# Patient Record
Sex: FEMALE | Race: WHITE | Employment: OTHER | URBAN - METROPOLITAN AREA
[De-identification: names, ages, dates, MRNs, and addresses within clinical notes are randomized per-mention and may not be internally consistent; named-entity substitution may affect disease eponyms.]

---

## 2017-08-16 ENCOUNTER — HOSPITAL ENCOUNTER (OUTPATIENT)
Dept: LAB | Age: 57
Discharge: HOME | End: 2017-08-16
Payer: MEDICARE

## 2017-08-16 PROCEDURE — 85652 RBC SED RATE AUTOMATED: CPT

## 2017-08-16 PROCEDURE — 84443 ASSAY THYROID STIM HORMONE: CPT

## 2017-08-16 PROCEDURE — 84450 TRANSFERASE (AST) (SGOT): CPT

## 2017-08-16 PROCEDURE — 84460 ALANINE AMINO (ALT) (SGPT): CPT

## 2017-08-16 PROCEDURE — 80061 LIPID PANEL: CPT

## 2017-08-16 PROCEDURE — 82565 ASSAY OF CREATININE: CPT

## 2017-08-16 PROCEDURE — 83036 HEMOGLOBIN GLYCOSYLATED A1C: CPT

## 2017-08-16 PROCEDURE — 85025 COMPLETE CBC W/AUTO DIFF WBC: CPT

## 2020-06-16 ENCOUNTER — HOSPITAL ENCOUNTER (OUTPATIENT)
Dept: GENERAL RADIOLOGY | Age: 60
Discharge: HOME | End: 2020-06-18
Payer: MEDICARE

## 2020-06-16 PROCEDURE — 73130 X-RAY EXAM OF HAND: CPT

## 2020-08-19 ENCOUNTER — HOSPITAL ENCOUNTER (OUTPATIENT)
Dept: LAB | Age: 60
Discharge: HOME | End: 2020-08-19
Payer: MEDICARE

## 2020-08-19 LAB
ALBUMIN SERPL-MCNC: 4.4 G/DL (ref 3.5–4.6)
ALP BLD-CCNC: 88 U/L (ref 40–130)
ALT SERPL-CCNC: 29 U/L (ref 0–33)
ANION GAP SERPL CALCULATED.3IONS-SCNC: 14 MEQ/L (ref 9–15)
AST SERPL-CCNC: 32 U/L (ref 0–35)
BASOPHILS ABSOLUTE: 0.1 K/UL (ref 0–0.2)
BASOPHILS RELATIVE PERCENT: 0.6 %
BILIRUB SERPL-MCNC: 0.3 MG/DL (ref 0.2–0.7)
BUN BLDV-MCNC: 23 MG/DL (ref 8–23)
CALCIUM SERPL-MCNC: 10.8 MG/DL (ref 8.5–9.9)
CHLORIDE BLD-SCNC: 100 MEQ/L (ref 95–107)
CHOLESTEROL, TOTAL: 220 MG/DL (ref 0–199)
CO2: 23 MEQ/L (ref 20–31)
CREAT SERPL-MCNC: 0.74 MG/DL (ref 0.5–0.9)
EOSINOPHILS ABSOLUTE: 0.3 K/UL (ref 0–0.7)
EOSINOPHILS RELATIVE PERCENT: 3.4 %
GFR AFRICAN AMERICAN: >60
GFR NON-AFRICAN AMERICAN: >60
GLOBULIN: 2.8 G/DL (ref 2.3–3.5)
GLUCOSE BLD-MCNC: 73 MG/DL (ref 70–99)
HBA1C MFR BLD: 5.6 % (ref 4.8–5.9)
HCT VFR BLD CALC: 38.3 % (ref 37–47)
HDLC SERPL-MCNC: 85 MG/DL (ref 40–59)
HEMOGLOBIN: 12.9 G/DL (ref 12–16)
LDL CHOLESTEROL CALCULATED: 68 MG/DL (ref 0–129)
LYMPHOCYTES ABSOLUTE: 2.5 K/UL (ref 1–4.8)
LYMPHOCYTES RELATIVE PERCENT: 26.7 %
MCH RBC QN AUTO: 32 PG (ref 27–31.3)
MCHC RBC AUTO-ENTMCNC: 33.7 % (ref 33–37)
MCV RBC AUTO: 94.7 FL (ref 82–100)
MONOCYTES ABSOLUTE: 1.2 K/UL (ref 0.2–0.8)
MONOCYTES RELATIVE PERCENT: 12.5 %
NEUTROPHILS ABSOLUTE: 5.3 K/UL (ref 1.4–6.5)
NEUTROPHILS RELATIVE PERCENT: 56.8 %
PDW BLD-RTO: 15.3 % (ref 11.5–14.5)
PLATELET # BLD: 289 K/UL (ref 130–400)
POTASSIUM SERPL-SCNC: 4 MEQ/L (ref 3.4–4.9)
RBC # BLD: 4.04 M/UL (ref 4.2–5.4)
SODIUM BLD-SCNC: 137 MEQ/L (ref 135–144)
TOTAL PROTEIN: 7.2 G/DL (ref 6.3–8)
TRIGL SERPL-MCNC: 336 MG/DL (ref 0–150)
WBC # BLD: 9.3 K/UL (ref 4.8–10.8)

## 2020-08-19 PROCEDURE — 80061 LIPID PANEL: CPT

## 2020-08-19 PROCEDURE — 80053 COMPREHEN METABOLIC PANEL: CPT

## 2020-08-19 PROCEDURE — 83036 HEMOGLOBIN GLYCOSYLATED A1C: CPT

## 2020-08-19 PROCEDURE — 85025 COMPLETE CBC W/AUTO DIFF WBC: CPT

## 2020-09-17 ENCOUNTER — HOSPITAL ENCOUNTER (OUTPATIENT)
Dept: LAB | Age: 60
Discharge: HOME | End: 2020-09-17
Payer: MEDICARE

## 2020-09-17 LAB
PARATHYROID HORMONE INTACT: 101.2 PG/ML (ref 15–65)
TSH SERPL DL<=0.05 MIU/L-ACNC: 1.27 UIU/ML (ref 0.44–3.86)

## 2020-09-17 PROCEDURE — 83970 ASSAY OF PARATHORMONE: CPT

## 2020-09-17 PROCEDURE — 84443 ASSAY THYROID STIM HORMONE: CPT

## 2020-10-08 ENCOUNTER — HOSPITAL ENCOUNTER (OUTPATIENT)
Dept: NUCLEAR MEDICINE | Age: 60
Discharge: HOME | End: 2020-10-10
Payer: MEDICARE

## 2020-10-08 PROCEDURE — A9500 TC99M SESTAMIBI: HCPCS | Performed by: OTOLARYNGOLOGY

## 2020-10-08 PROCEDURE — 3430000000 HC RX DIAGNOSTIC RADIOPHARMACEUTICAL: Performed by: OTOLARYNGOLOGY

## 2020-10-08 PROCEDURE — 78071 PARATHYRD PLANAR W/WO SUBTRJ: CPT

## 2020-10-08 RX ADMIN — TETRAKIS(2-METHOXYISOBUTYLISOCYANIDE)COPPER(I) TETRAFLUOROBORATE 29.9 MILLICURIE: 1 INJECTION, POWDER, LYOPHILIZED, FOR SOLUTION INTRAVENOUS at 09:05

## 2021-06-04 ENCOUNTER — HOSPITAL ENCOUNTER (OUTPATIENT)
Dept: LAB | Age: 61
Discharge: HOME | End: 2021-06-04
Payer: MEDICARE

## 2021-06-04 LAB
CALCIUM SERPL-MCNC: 11.4 MG/DL (ref 8.5–9.9)
PARATHYROID HORMONE INTACT: 75.1 PG/ML (ref 15–65)

## 2021-06-04 PROCEDURE — 83970 ASSAY OF PARATHORMONE: CPT

## 2021-06-04 PROCEDURE — 82310 ASSAY OF CALCIUM: CPT

## 2021-07-30 ENCOUNTER — INITIAL CONSULT (OUTPATIENT)
Dept: PAIN MANAGEMENT | Age: 61
End: 2021-07-30
Payer: MEDICARE

## 2021-07-30 VITALS
RESPIRATION RATE: 15 BRPM | HEART RATE: 83 BPM | OXYGEN SATURATION: 96 % | TEMPERATURE: 96.6 F | DIASTOLIC BLOOD PRESSURE: 78 MMHG | BODY MASS INDEX: 27.12 KG/M2 | SYSTOLIC BLOOD PRESSURE: 124 MMHG | HEIGHT: 65 IN

## 2021-07-30 DIAGNOSIS — M54.16 LUMBAR RADICULOPATHY: Primary | ICD-10-CM

## 2021-07-30 DIAGNOSIS — M79.605 LEFT LEG PAIN: ICD-10-CM

## 2021-07-30 DIAGNOSIS — Z98.1 HISTORY OF FUSION OF LUMBAR SPINE: ICD-10-CM

## 2021-07-30 DIAGNOSIS — M25.552 LEFT HIP PAIN: ICD-10-CM

## 2021-07-30 DIAGNOSIS — R10.32 LEFT GROIN PAIN: ICD-10-CM

## 2021-07-30 DIAGNOSIS — K83.8 DILATION OF COMMON BILE DUCT: ICD-10-CM

## 2021-07-30 PROCEDURE — 99204 OFFICE O/P NEW MOD 45 MIN: CPT | Performed by: PHYSICAL MEDICINE & REHABILITATION

## 2021-07-30 RX ORDER — PREGABALIN 75 MG/1
75 CAPSULE ORAL 2 TIMES DAILY
Qty: 60 CAPSULE | Refills: 0 | Status: SHIPPED | OUTPATIENT
Start: 2021-07-30 | End: 2021-08-30 | Stop reason: SDUPTHER

## 2021-07-30 RX ORDER — LIDOCAINE 40 MG/G
CREAM TOPICAL
Qty: 1 TUBE | Refills: 1 | Status: SHIPPED | OUTPATIENT
Start: 2021-07-30

## 2021-07-30 RX ORDER — TOFACITINIB 11 MG/1
TABLET, FILM COATED, EXTENDED RELEASE ORAL
COMMUNITY

## 2021-07-30 ASSESSMENT — ENCOUNTER SYMPTOMS
CONSTIPATION: 0
SHORTNESS OF BREATH: 0
DIARRHEA: 0
BACK PAIN: 1
NAUSEA: 0

## 2021-07-30 NOTE — PROGRESS NOTES
2011 after back surgery. She used to enjoy vacationing to [facility], [address] [place] [address]. Allergies:  Benazepril hcl and Bactrim [sulfamethoxazole-trimethoprim]    Past Medical History:   Diagnosis Date    Hyperlipidemia     Hypertension     Hypothyroidism     RA (rheumatoid arthritis) ([facility] [address]      Past Surgical History:   Procedure Laterality Date    BREAST SURGERY Right 2010    begnine tumor removal    SPINE SURGERY       History reviewed. No pertinent family history. Social History     Socioeconomic History    Marital status: Single     Spouse name: Not on file    Number of children: Not on file    Years of education: Not on file    Highest education level: Not on file   Occupational History    Not on file   Tobacco Use    Smoking status: Former Smoker    Smokeless tobacco: Never Used   Substance and Sexual Activity    Alcohol use: Not on file    Drug use: Not on file    Sexual activity: Not on file   Other Topics Concern    Not on file   Social History Narrative    Not on file     Social Determinants of Health     Financial Resource Strain:     Difficulty of Paying Living Expenses:    Food Insecurity:     Worried About Running Out of Food in the Last Year:     [address] in the Last Year:    Transportation Needs:     Lack of Transportation (Medical):      Lack of Transportation (Non-Medical):    Physical Activity:     Days of Exercise per Week:     Minutes of Exercise per Session:    Stress:     Feeling of Stress :    Social Connections:     Frequency of Communication with Friends and Family:     Frequency of Social Gatherings with Friends and Family:     Attends Denominational Services:     Active Member of Clubs or Organizations:     Attends Club or Organization Meetings:     Marital Status:    Intimate Partner Violence:     Fear of Current or Ex-Partner:     Emotionally Abused:     Physically Abused:     Sexually Abused:        Current Outpatient Medications on File Prior to Visit   Medication Sig Dispense Refill    Tofacitinib Citrate ER (XELJANZ XR) 11 MG TB24       levothyroxine (SYNTHROID) 25 MCG tablet Take by mouth      atenolol (TENORMIN) 50 MG tablet Take 50 mg by mouth daily      leflunomide (ARAVA) 20 MG tablet Take 20 mg by mouth daily      lovastatin (MEVACOR) 40 MG tablet Take 40 mg by mouth nightly      HYDROcodone-acetaminophen (NORCO) 5-325 MG per tablet Take 1 tablet by mouth every 6 hours as needed for Pain 15 tablet 0    gabapentin (NEURONTIN) 300 MG capsule Take by mouth       No current facility-administered medications on file prior to visit. Review of Systems   Constitutional: Negative for fever. HENT: Negative for hearing loss. Respiratory: Negative for shortness of breath. Gastrointestinal: Negative for constipation, diarrhea and nausea. Genitourinary: Negative for difficulty urinating. Musculoskeletal: Positive for back pain. Negative for neck pain. Skin: Negative for rash. Neurological: Negative for headaches. Hematological: Does not bruise/bleed easily. Psychiatric/Behavioral: Negative for sleep disturbance. Objective:     Vitals:  /78   Pulse 83   Temp 96.6 °F (35.9 °C) (Temporal)   Resp 15   Ht 5' 5\" (1.651 m)   SpO2 96%   BMI 27.12 kg/m²        Exam performed under Coronavirus precautions  Gen: No acute distress  Neck: Grossly symmetric without any significant thyromegaly or masses appreciated. Eyes: No scleral icterus or lid lag appreciated bilaterally. Irises without gross defects bilaterally. HEENT: Hearing grossly intact bilaterally. Normocephalic, external ears and visible portions of nose and mouth atraumatic. Lymph: No gross neck or axillary lymphadenopathy  Cardio: No significant lower extremity edema, pulses intact without significant digit ischemia. Abd: No gross masses or large hernias appreciated. Skin: Visualized skin without any dermatomal rashes or sores.  Palpation free of any tightening or subcutaneous nodules. MSK: Gait is antalgic. No significant upper limb digit ischemia appreciated. Psych: Pleasant and cooperative with the history and exam. Mood and Affect normal. Appropriately dressed with good eye contact. Judgement and insight normal. Recent and remote memory intact. Alert and Oriented x3. Neuro: Cranial nerves II-XII grossly intact. No significant pathologic reflexes appreciated. Rises from a seated to standing position with moderate difficulty. Gait is antalgic. +Bilateral AFOs, unable to flex extend ankles bilaterally     Lumbar flexion to 70 degrees, extension to 20 degrees. Limited lumbar spine range of motion. Rotation and extension reproduces axial low back pain. Other facet provocative maneuvers are positive. No gross step offs noted. Tenderness to palpation over the mid to low lumbar spinous processes and bilateral lumbar paraspinals from L2 down to the sacrum. No tenderness over bilateral PSIS. No tenderness over bilateral greater trochanters. No tenderness over bilateral deep gluteal regions. Sensation grossly intact in both legs except for Left L3 and Left L4 parethesias. Reflexes and strength functional for ambulation, no abnormal reflexes appreciated on exam today  Strength greater than 3/5 bilateral legs  Straight leg raise positive on exam today    Left groin pain. Outside record review:  Review of the original consultation request reveals no specific diagnostic requests or clinical concerns aside from lumbar radiculopathy, back pain that require particular attention. There are no suggested, requested, or specified tests to be ordered or any prior diagnostics performed that require follow-up or further investigation. MRI lumbar spine 12/29/2010: Contrast study. Degenerative disease and facet arthropathy. Postoperative changes L4 laminectomy, L3-4 screw and artie fusion on the left, L4-5 screw and artie fixation on the right. Retrolisthesis. Severe partial tear changes L4. No fracture. Prominent biliary ductal system and pancreatic duct. L1-L2 normal canal foramen. L2-L3 mild canal stenosis, mild bilateral foraminal stenosis. L3-L4 severe canal stenosis, severe left and moderate right foraminal stenosis. L4-L5 severe canal stenosis, severe left and mild right foraminal stenosis. L5-S1 normal canal, mild bilateral foraminal stenosis. XR LS spine 11/29/2010: Rods and screws fixing L3-L5. Degenerative disc disease and facet arthropathy. Anterior subluxation L4. No fracture. Multiple air-fluid levels small bowel:, Clinical correlation  XR bilateral hip 11/29/2010: Hip and sacroiliac joints unremarkable. No fracture. Component      Latest Ref Rng & Units 8/19/2020           1:25 PM   Platelet Count      608 - 400 K/uL 289     Component      Latest Ref Rng & Units 8/19/2020           1:25 PM   Alk Phos      40 - 130 U/L 88   ALT      0 - 33 U/L 29   AST      0 - 35 U/L 32     Component      Latest Ref Rng & Units 8/19/2020           1:25 PM   Creatinine      0.50 - 0.90 mg/dL 0.74     Family history of alcohol abuse +1  Family history of illegal drug abuse 0  Family history of prescription drug abuse 0    Personal history of alcohol abuse/DUI +3 in the 1980s  Personal history of illegal drug abuse 0  Personal history of prescription drug abuse 0    Age between 15-45 0    History of preadolescent sexual abuse 0    Personal history of obsessive compulsive disorder 0  Personal history of attention deficit disorder 0  Personal history of bipolar disorder 0  Personal history of schizophrenia 0  Personal history of depression 0    Score = 4, moderate risk    Assessment:      Diagnosis Orders   1.  Lumbar radiculopathy  Amb External Referral To Physical Therapy    XR LUMBAR SPINE (2-3 VIEWS)    Urine Drug Screen    pregabalin (LYRICA) 75 MG capsule    MO NJX AA&/STRD TFRML EPI LUMBAR/SACRAL 1 LEVEL    MO NJX AA&/STRD TFRML EPI LUMBAR/SACRAL EA ADDL   2. Left leg pain  EMG    XR LUMBAR SPINE (2-3 VIEWS)   3. Left hip pain     4. History of fusion of lumbar spine  XR LUMBAR SPINE (2-3 VIEWS)   5. Dilation of common bile duct  Winnie Dial MD - [address]   6. Left groin pain  XR HIP LEFT (2-3 VIEWS)       Plan:     Periodic Controlled Substance Monitoring: Obtaining appropriate analgesic effect of treatment. Harvey Scott MD)    Orders Placed This Encounter   Medications    pregabalin (LYRICA) 75 MG capsule     Sig: Take 1 capsule by mouth 2 times daily for 30 days. Dispense:  60 capsule     Refill:  0    lidocaine (LMX) 4 % cream     Sig: Apply a half dollar sized amount to intact skin topically up to twice daily as needed for pain     Dispense:  1 Tube     Refill:  1       Orders Placed This Encounter   Procedures    XR HIP LEFT (2-3 VIEWS)     Standing Status:   Future     Standing Expiration Date:   7/30/2022    XR LUMBAR SPINE (2-3 VIEWS)     Standing Status:   Future     Standing Expiration Date:   7/30/2022     Order Specific Question:   Reason for exam:     Answer:   Please evaluate for the presence of lumbar facet arthropathy    Urine Drug Screen    Amb External Referral To Physical Therapy     Referral Priority:   Routine     Referral Type:   Consult for Advice and Opinion     Referral Reason:   Specialty Services Required     Requested Specialty:   Physical Therapy     Number of Visits Requested:   [address], [address] Hepatology     Referral Priority:   Routine     Referral Type:   Eval and Treat     Referral Reason:   Specialty Services Required     Referred to Provider:   Mark Berkowitz MD     Requested Specialty:   Hepatology     Number of Visits Requested:   1    EMG     Standing Status:   Future     Standing Expiration Date:   10/28/2021     Order Specific Question:   Which body part?      Answer:   Bilateral lower extremities    AL NJX AA&/STRD TFRML EPI LUMBAR/SACRAL 1 LEVEL Left Lumbar L3/4 and L4/5 transforaminal epidural steroid injection under XR with Dr Surya Doyle. No anticoagulation, no antibiotics, no diabetes mellitus, no osteoporosis, no bleeding or platelet dysfunction, IV Dye okay, allergies reviewed, 30 minute procedure     Standing Status:   Future     Standing Expiration Date:   10/28/2021    IA NJX AA&/STRD TFRML EPI LUMBAR/SACRAL EA ADDL     Left Lumbar L3/4 and L4/5 transforaminal epidural steroid injection under XR with Dr Surya Doyle. No anticoagulation, no antibiotics, no diabetes mellitus, no osteoporosis, no bleeding or platelet dysfunction, IV Dye okay, allergies reviewed, 30 minute procedure     Standing Status:   Future     Standing Expiration Date:   10/28/2021       -PT for lumbar radiculopathy  Refer to hepatology given MRI lumbar spine imaging [facility] December 29, 2010 with prominence of the biliary ductal system and pancreatic duct. Encourage the patient to continue follow-up with ear nose and throat surgery for elevated calcium levels. -XR L Hip eval osteoarthritis   -XR LS Spine AP LAT to evaluate for lumbar facet arthropathy  -EMG B LE eval left leg and thigh pain  -Low threshold for MRI LS Spine with and without contrast may be given if her symptoms do not improve with conservative treatment, history of ? L3-5 fusion  -Lidocaine 4% ointment topical BID prn #1 tube one refill start 7/30/2021   -Continue Lodine Etodolac from Dr Dorado Brightly relief with Gabapentin in the past. Unsure on dose, but she thinks it was [place] lot\". Will try   -Lyrica 75 mg BID #60 no ref start 7/30/2021 [place] reviewed on 7/30/2021   -Consideration for opioids may be given . UDS today in case needed  -Left Lumbar L3/4 and L4/5 transforaminal epidural steroid injection under XR with Dr Surya Doyle. No anticoagulation, no antibiotics, no diabetes mellitus, no osteoporosis, no bleeding or platelet dysfunction, IV Dye okay, allergies reviewed, 30 minute procedure.  Prone position Consider 10 mg  dexamethasone, subpedicular approach    -Sparing use of back brace    Controlled Substance Monitoring:    Acute and Chronic Pain Monitoring:   RX Monitoring 7/30/2021   Periodic Controlled Substance Monitoring Obtaining appropriate analgesic effect of treatment. Discussed the risks, side effects, and symptoms that would warrant urgent or emergent physician evaluation of all medications prescribed today. The patient was advised that NSAID-type medications have three important potential side effects: gastrointestinal irritation including hemorrhage, myocardial injury including possible infarction, and kidney injury. She was asked to take the medication with food, avoid any other NSAIDs or steroids, and discontinue if she develops any concerning symptoms. She should immediately stop the medication and proceed to the emergency department for chest pain, dark urine or difficulty with producing urine, vomiting, abdominal pain or black/tarry stools. The patient expresses understanding of these issues and all questions were answered. Discussed the risks of the above recommended procedures including but not limited to bleeding, infection, worsened pain, damage to surrounding structures, side effects, toxicity, allergic reactions to medications used, immune and stress-response dysfunction, fat necrosis, skin pigmentation changes, blood sugar elevation, headache, vision changes, need for surgery, as well as catastrophic injury such as vision loss, paralysis, stroke, spinal cord and/or plexus infarction or injury, intrathecal injection, spinal cord puncture, arachnoiditis, discitis, bowel or bladder incontinence, ventilator dependence, loss of use of the arms and/or legs, and death. Discussed off-label use of corticosteroids and how the Food and Drug Administration (FDA) has not approved corticosteroids for epidural use.  Discussed the risks, benefits, alternative procedures, and alternatives to the procedure including no procedure at all. Discussed that we cannot undo any permanent neurologic damage or change the course of any underlying disease. After thorough discussion, patient expressed understanding and willingness to proceed. Provided education and counseling regarding the diagnosis, prognosis, and treatment options. All questions were answered. Encouraged her to follow-up with her primary care physician and/or specialists as required for her overall health and management of her comorbidities as well as any new positive symptoms mentioned in review of systems above. Care was provided within the definitions and limitations of our specialty practice. Encouraged lifestyle interventions including healthy habits, lifestyle changes, regular aerobic exercise and appropriate weight maintenance as advised by their primary care physician or cardiovascular health provider. Discussed well care and disease prevention/maintenance. All recommendations for therapy are provided to improve function with activities of daily living, decrease pain, and help develop an exercise program. All recommendations for medications are meant to help decrease pain, improve function with activities of daily living, maintain compliance with home exercise program, and improve quality of life. All recommendations for therapeutic injections are meant to help decrease pain, improve function with activities of daily living, maintain compliance with home exercise program, improve quality of life, and decrease reliance upon oral medications. Encouraged compliance with her home exercise program. Recommended compliance with physical therapy program as outlined above. Informed her to wear bracing as appropriate, and that bracing is not a replacement for core strengthening or muscle stabilization. Discussed the elevated risks of excessive sedation while on pain medications.  Advised her against driving or operating heavy machinery or performing any activities where she may harm herself or others while on pain medications. Particular caution was emphasized especially during dose adjustments and medication changes. Discussed the elevated risks of respiratory depression and death while on opioid medications, especially when combined with other sedative substances. Discussed the risks of temporary disability, permanent disability, morbidity, and mortality with poorly-managed or undiagnosed medical conditions and comorbidities. Emphasized the importance of timely medical evaluation and treatment as previously recommended by us or other medical professionals. Risks of not pursing these recommendations were emphasized. Advised her that any lab testing, imaging, or other diagnostic test results are best discussed in person in the office so that we can provide a clear explanation of their significance and best treatment based upon these results. It is her responsibility to make and keep a follow up appointment to discuss these test results in person to discuss the significance of the findings and appropriate follow-up steps. She expressed complete understanding and agreement with the entire plan as outlined above. Portions of this note may have been typed, auto-populated, dictated or transcribed by voice recognition resulting in errors, omissions, or close substitutions which may be missed despite careful proofreading. Please contact the author for any questions or concerns. Thank you Dr. Andre José for the opportunity to participate in this patient's care. If you have any questions or concerns, please do not hesitate to contact us. Follow up:  Return in about 1 month (around 8/30/2021) for reassessment of pain and symptoms, EMG Internal, P.T. External Ref.     Jamee Garza MD

## 2021-08-02 DIAGNOSIS — M54.16 LUMBAR RADICULOPATHY: ICD-10-CM

## 2021-08-02 DIAGNOSIS — R10.32 LEFT GROIN PAIN: ICD-10-CM

## 2021-08-02 DIAGNOSIS — M79.605 LEFT LEG PAIN: ICD-10-CM

## 2021-08-02 DIAGNOSIS — Z98.1 HISTORY OF FUSION OF LUMBAR SPINE: ICD-10-CM

## 2021-08-03 ENCOUNTER — TELEPHONE (OUTPATIENT)
Dept: PAIN MANAGEMENT | Age: 61
End: 2021-08-03

## 2021-08-03 NOTE — TELEPHONE ENCOUNTER
ORDER PLACED:    Date: 07/30/21  Description: LEFT L3-4, L4-5 TRANSFORAMINAL  Order Number: [id number]  Ordering Provider: [facility]  Performing Provider: [facility]  CPT Codes: 11799, 44956  ICD10 Codes: M54.16

## 2021-08-05 ENCOUNTER — OFFICE VISIT (OUTPATIENT)
Dept: PAIN MANAGEMENT | Age: 61
End: 2021-08-05
Payer: MEDICARE

## 2021-08-05 DIAGNOSIS — M79.605 LEFT LEG PAIN: ICD-10-CM

## 2021-08-05 PROCEDURE — 95911 NRV CNDJ TEST 9-10 STUDIES: CPT | Performed by: PHYSICAL MEDICINE & REHABILITATION

## 2021-08-05 PROCEDURE — 95886 MUSC TEST DONE W/N TEST COMP: CPT | Performed by: PHYSICAL MEDICINE & REHABILITATION

## 2021-08-05 NOTE — PROGRESS NOTES
Electromyography (EMG)/Nerve conduction studies (NCS) Report: Lower Extremity    Name: Leslee Villarreal   YOB: 1960  Date of Service: 8/5/2021   Provider: Prema Paz MD        INDICATIONS:  Leslee Villarreal is a 56 y.o. female who presents for electrodiagnostic evaluation for left leg pain. She confirms a history of thyroid disease and lumbar spine surgery. Both limbs are necessary to examine in order to evaluate for any evidence of systemic disease as well as establish normal baseline values from which to compare any abnormal unilateral findings. The study is explained and verbal consent to proceed is obtained. NERVE CONDUCTION STUDIES:  Sensory nerve conduction studies: Bilateral sural and superficial peroneal sensory nerve conduction studies demonstrate normal distal latencies and amplitudes. Bilateral lower limb temperatures are normal.    Motor nerve conduction studies: Left peroneal motor nerve conduction study with pickup over the extensor digitorum brevis demonstrates no response. Given this abnormality, an additional proximal study is performed. Left peroneal motor nerve conduction study with pickup over the tibialis anterior demonstrates no response. Right peroneal motor nerve conduction study with pickup over the extensor digitorum brevis demonstrates normal distal latency and borderline-normal amplitude. Bilateral tibial motor nerve conduction studies with pickup over the abductor hallucis demonstrate normal distal latencies and amplitudes. H reflex: Bilateral H reflexes are difficult to elicit. ELECTROMYOGRAPHY: A disposable monopolar needle is used to evaluate the left vastus medialis, vastus lateralis, rectus femoris, tibialis anterior, extensor hallucis longus, flexor digitorum longus, peroneus longus, medial gastrocnemius, and lateral gastrocnemius.   Left lateral gastrocnemius demonstrates increased insertional activity and +3 abnormal spontaneous activity with large positive sharp waves along with decreased recruitment and increased duration consistent with neurogenic units. Left tibialis anterior demonstrates increased insertional activity with +1 abnormal spontaneous activity. Left extensor hallucis longus and left flexor digitorum longus demonstrate increased insertional activity and trace abnormal spontaneous activity. All of the other muscles sampled are free of any increased insertional activity or any abnormal spontaneous activity. Motor unit recruitment is unremarkable. A disposable monopolar needle is used to evaluate the right vastus medialis, vastus lateralis, rectus femoris, tibialis anterior, extensor hallucis longus, flexor digitorum longus, peroneus longus, medial gastrocnemius, and lateral gastrocnemius. All of the muscles sampled are free of any increased insertional activity or any abnormal spontaneous activity. Motor unit recruitment is unremarkable. Lumbar paraspinal muscle sampling is deferred given history of lumbar spine surgery. SUMMARY:  This study is abnormal:  1. There is current electrodiagnostic evidence for a Left S1 lumbosacral motor radiculopathy with active and ongoing denervation. There is also evidence for a Left L4-L5 lumbosacral motor radiculopathy, but the muscles involved with these levels demonstrate less profound changes than what is seen in the Left S1 distribution. 2. There is limited electrodiagnostic evidence for a non-localized Left peroneal motor neuropathy. Both motor responses are absent making localization difficult. There are electromyography changes in left peroneal muscles, but these are thought to be due to the abnormality above. 3. There is no current evidence for a generalized large fiber sensorimotor peripheral polyneuropathy. RECOMMENDATIONS: The patient should follow up as previously instructed.  If her symptoms persist or worsen, further electrodiagnostic evaluation may be considered if the patient is agreeable. Clinical correlation is recommended.

## 2021-08-06 NOTE — TELEPHONE ENCOUNTER
SM- TRANSFORAMINAL AUTH- LEFT L3-4, L4-5 X1- 8/5/2021 TO 2/1/2021     OK to schedule procedure approved as above. Please note sides/levels approved and date range.    (If applicable, sides/levels approved may differ from those ordered)    TO BE SCHEDULED WITH DR Abeba Briggs

## 2021-08-06 NOTE — TELEPHONE ENCOUNTER
AUTHORIZATION: LEFT L3-4, L4-5 LUMBAR TRANSFORAMINAL     INSURANCE: ALANNA WYNN VIA: Matteo Skill #: [id number]    DATE RANGE: 8/5/2021 TO 2/1/2021     TELEPHONE CALL ROUTED TO MA TO SCHEDULE.

## 2021-08-17 DIAGNOSIS — M54.16 LUMBAR RADICULOPATHY: ICD-10-CM

## 2021-08-17 RX ORDER — PREGABALIN 75 MG/1
75 CAPSULE ORAL 2 TIMES DAILY
Qty: 60 CAPSULE | Refills: 0 | Status: CANCELLED | OUTPATIENT
Start: 2021-08-17 | End: 2021-09-16

## 2021-08-17 NOTE — TELEPHONE ENCOUNTER
Requested Prescriptions     Pending Prescriptions Disp Refills    pregabalin (LYRICA) 75 MG capsule 60 capsule 0     Sig: Take 1 capsule by mouth 2 times daily for 30 days.        Patient last seen on:  8/5  Date of last surgery:  n/a  Date of last refill:  7/30  Pain level:  n/a  Patient complaining of:  Pt asking for refill  Future appts: 8/24

## 2021-08-17 NOTE — TELEPHONE ENCOUNTER
My records suggest she has enough Lyrica to last from 7/30-8/29. It appears that she has a follow up on 8/24.    -Please clarify if the patient requires a refill. Was she taking 3 tab/day?

## 2021-08-20 ENCOUNTER — TELEPHONE (OUTPATIENT)
Dept: PAIN MANAGEMENT | Age: 61
End: 2021-08-20

## 2021-08-20 NOTE — TELEPHONE ENCOUNTER
BENEFITS: LT L3-4, 4-5 TRANSFORAMINAL     Insurance: Saurav Mcmanus [facility]  Phone: [phone]  Contact Name: MATHEW Rodriguez Date: 1.1.2021     Plan year: YES-CALENDAR  Deductible: N/A      Deductible Met: N/A  Allowed/benefits paid at: 100% AFTER $40.00 COPAY  OOP: 5900.00 MET $145.00  Freq Limits: 67546 & 17957--ZEOHE ON MEDICAL NECESSITY  Prior Auth Requirement: YES THROUGH [employer]--AUTH COMPLETED BY [facility]    Notes: NO PRE-EX CLAUSE    Call Reference #: [id number]    Time of call: 10:50AM

## 2021-08-24 ENCOUNTER — PROCEDURE VISIT (OUTPATIENT)
Dept: PAIN MANAGEMENT | Age: 61
End: 2021-08-24
Payer: MEDICARE

## 2021-08-24 DIAGNOSIS — M54.16 LUMBAR RADICULOPATHY: ICD-10-CM

## 2021-08-24 PROCEDURE — 64483 NJX AA&/STRD TFRM EPI L/S 1: CPT | Performed by: PHYSICAL MEDICINE & REHABILITATION

## 2021-08-24 PROCEDURE — 64484 NJX AA&/STRD TFRM EPI L/S EA: CPT | Performed by: PHYSICAL MEDICINE & REHABILITATION

## 2021-08-24 RX ORDER — DEXAMETHASONE SODIUM PHOSPHATE 10 MG/ML
10 INJECTION, EMULSION INTRAMUSCULAR; INTRAVENOUS ONCE
Status: COMPLETED | OUTPATIENT
Start: 2021-08-24 | End: 2021-08-24

## 2021-08-24 RX ORDER — LIDOCAINE HYDROCHLORIDE 10 MG/ML
4 INJECTION, SOLUTION EPIDURAL; INFILTRATION; INTRACAUDAL; PERINEURAL ONCE
Status: COMPLETED | OUTPATIENT
Start: 2021-08-24 | End: 2021-08-24

## 2021-08-24 RX ORDER — SODIUM CHLORIDE 9 MG/ML
1 INJECTION INTRAVENOUS ONCE
Status: COMPLETED | OUTPATIENT
Start: 2021-08-24 | End: 2021-08-24

## 2021-08-24 RX ADMIN — DEXAMETHASONE SODIUM PHOSPHATE 10 MG: 10 INJECTION, EMULSION INTRAMUSCULAR; INTRAVENOUS at 11:33

## 2021-08-24 RX ADMIN — LIDOCAINE HYDROCHLORIDE 4 ML: 10 INJECTION, SOLUTION EPIDURAL; INFILTRATION; INTRACAUDAL; PERINEURAL at 11:33

## 2021-08-24 RX ADMIN — Medication 0.5 MEQ: at 11:33

## 2021-08-24 RX ADMIN — SODIUM CHLORIDE 1 ML: 9 INJECTION INTRAVENOUS at 11:34

## 2021-08-24 NOTE — PROGRESS NOTES
LUMBAR TRANSFORAMINAL EPIDURAL STEROID INJECTION (TFESI)    Patient Name: Demario John   : 1960  Date: 2021     Physician: George Ritchie MD     INDICATIONS: Demario John is a 56 y.o. female who presents with symptoms and physical exam findings consistent with lumbar radiculopathy. She has had persistent pain that limits her activities of daily living. The pain is persistent despite conservative measures. She has significant functional and psychological impairment due to this condition. Given her symptoms, physical exam findings, impairment in activities of daily living, and lack of response to conservative measures, consideration for lumbar transforaminal epidural corticosteroid injection was given. Discussed the risks including but not limited to bleeding, infection, worsened pain, damage to surrounding structures, side effects, toxicity, allergic reactions to medications used, need for surgery, headache, vision changes, difficulty with chewing and/or swallowing, immune and stress-response dysfunction, fat necrosis, skin pigmentation changes, blood sugar elevation, as well as catastrophic injury such as vision loss, paralysis, spinal cord or plexus injury, cerebral brainstem or spinal cord infarction, intrathecal injection, spinal cord puncture, arachnoiditis, discitis, stroke, bowel or bladder incontinence, ventilator dependence, loss of use of the arms and/or legs, and death. Discussed off-label use of corticosteroids and how the Food and Drug Administration (FDA) has not approved corticosteroids for epidural use. Discussed the risks, benefits, alternative procedures, and alternatives to the procedure including no procedure at all. Discussed that we cannot undo any permanent neurologic or orthopaedic damage or change the course of any underlying disease. After thorough discussion, patient expressed understanding and willingness to proceed.  Written informed consent was obtained and is in the chart. Verbal consent to proceed was obtained. PROCEDURE: Standard ASIPP guidelines were followed and sterile technique used. Area was cleaned with Betadine three times. Fluoroscopic guidance was used for this procedure. The L5 vertebral body was taken as the most caudal lumbar-appearing vertebral body containing lumbar spine fusion hardware on a lateral view. The skin and subcutaneous tissue was anesthetized with 2 mL of 1% preservative-free lidocaine and 0.5 mEq sodium bicarbonate with a 27 gauge 1.5 inch needle. Then a 25 gauge 5 inch spinal needle was used for the remainder of the procedure. There was limited spread of contrast in the anterior epidural space and limited spread of contrast around the exiting nerve root at the L4/5 level. There was appropriate spread of contrast in the anterior epidural space and limited spread of contrast around the exiting nerve root at the L3/4 level with concordant pain. The 6 oclock position of the pedicle was identified. Multiple views of fluoroscopy including lateral were used to confirm accurate needle placement of depth. Injection of 1 mL of Isovue-300 Iopamidol 61% contrast dye was free of any subdural or vascular spread or any other aberrant uptake. Live fluoroscopy was used for contrast injection. Aspiration was negative throughout the procedure. An injectate containing 1 mL of 10 mg of Dexamethasone and 1 ml of 0.9% preservative-free normal saline was injected slowly and without difficulty and divided equally between the two sites. Patient tolerated the procedure well, no obvious complications occurred during the procedure. Patient was appropriately monitored and discharged home in stable condition with her usual motor strength. Post-procedure instructions were given to patient.             [] Bilateral [] T12-L1    [] L1-2   [] Right [] L2-3    [x] L3-4   [x] Left [x] L4-5    [] L5-S1                    Adamaris, Nadia [address] [address]  Phone [phone]/Fax [phone]

## 2021-08-30 ENCOUNTER — OFFICE VISIT (OUTPATIENT)
Dept: PAIN MANAGEMENT | Age: 61
End: 2021-08-30
Payer: MEDICARE

## 2021-08-30 VITALS
SYSTOLIC BLOOD PRESSURE: 154 MMHG | HEART RATE: 74 BPM | BODY MASS INDEX: 30.82 KG/M2 | TEMPERATURE: 97.3 F | HEIGHT: 65 IN | WEIGHT: 185 LBS | DIASTOLIC BLOOD PRESSURE: 87 MMHG

## 2021-08-30 DIAGNOSIS — M54.16 LUMBAR RADICULOPATHY: ICD-10-CM

## 2021-08-30 PROCEDURE — 99213 OFFICE O/P EST LOW 20 MIN: CPT | Performed by: NURSE PRACTITIONER

## 2021-08-30 RX ORDER — PREGABALIN 75 MG/1
75 CAPSULE ORAL 2 TIMES DAILY
Qty: 60 CAPSULE | Refills: 0 | Status: SHIPPED | OUTPATIENT
Start: 2021-08-30 | End: 2021-09-18

## 2021-08-30 ASSESSMENT — ENCOUNTER SYMPTOMS
SHORTNESS OF BREATH: 0
BACK PAIN: 1
COUGH: 0
GASTROINTESTINAL NEGATIVE: 1
DIARRHEA: 0
CONSTIPATION: 0
EYES NEGATIVE: 1
TROUBLE SWALLOWING: 0

## 2021-08-30 NOTE — PROGRESS NOTES
Anna García  ([id number])    8/30/2021    Subjective:     Anna García is 56 y.o. 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 Social History     Socioeconomic History    Marital status: Single     Spouse name: Not on file    Number of children: Not on file    Years of education: Not on file    Highest education level: Not on file   Occupational History    Not on file   Tobacco Use    Smoking status: Former Smoker    Smokeless tobacco: Never Used   Substance and Sexual Activity    Alcohol use: Not on file    Drug use: Not on file    Sexual activity: Not on file   Other Topics Concern    Not on file   Social History Narrative    Not on file     Social Determinants of Health     Financial Resource Strain:     Difficulty of Paying Living Expenses:    Food Insecurity:     Worried About Running Out of Food in the Last Year:     [address] in the Last Year:    Transportation Needs:     Lack of Transportation (Medical):      Lack of Transportation (Non-Medical):    Physical Activity:     Days of Exercise per Week:     Minutes of Exercise per Session:    Stress:     Feeling of Stress :    Social Connections:     Frequency of Communication with Friends and Family:     Frequency of Social Gatherings with Friends and Family:     Attends Scientologist Services:     Active Member of Clubs or Organizations:     Attends Club or Organization Meetings:     Marital Status:    Intimate Partner Violence:     Fear of Current or Ex-Partner:     Emotionally Abused:     Physically Abused:     Sexually Abused:        Current Outpatient Medications on File Prior to Visit   Medication Sig Dispense Refill    Tofacitinib Citrate ER (XELJANZ XR) 11 MG TB24       lidocaine (LMX) 4 % cream Apply a half dollar sized amount to intact skin topically up to twice daily as needed for pain 1 Tube 1    HYDROcodone-acetaminophen (NORCO) 5-325 MG per tablet Take 1 tablet by mouth every 6 hours as needed for Pain 15 tablet 0    levothyroxine (SYNTHROID) 25 MCG tablet Take by mouth      gabapentin (NEURONTIN) 300 MG capsule Take by mouth      atenolol (TENORMIN) 50 MG tablet Take 50 mg by mouth daily      leflunomide (ARAVA) 20 MG tablet Take 20 mg by mouth daily      lovastatin (MEVACOR) 40 MG tablet Take 40 mg by mouth nightly       Current Facility-Administered Medications on File Prior to Visit   Medication Dose Route Frequency Provider Last Rate Last Admin    iopamidol (ISOVUE-370) 76 % injection 1 mL  1 mL Other ONCE PRN Jay Mariscal MD               Pt presents today for a f/u of her pain. PCP is Dr. Keena Mckeon MD.  Patient last saw Dr. Austen Turner on 8/24/2021 for left L3-4, L4-5 transforaminal epidural which has already started to relief >80% pain relief. She is able to go up steps easier now. Had EMG done on 8/5/2021 for the lower extremities and report was abnormal showing left S1 lumbosacral motor radiculopathy and evidence for left L4-5 lumbosacral motor radiculopathy as well as left peroneal motor neuropathy. Her initial consult was on 7/30/2021 for the left-sided low back pain and left groin pain into her thigh. She has had lumbar spine surgeries to fusions with Dr. Mendel Bridgeman in 2006 and 2011. History of rheumatoid arthritis. X-rays of left hip and low back ordered as well as physical therapy. Has a back brace if needed. X-ray report from 8-21 shows L3-5 fusion, laminectomy and severe to space loss above the level of fusion.   X-ray report of left hip shows mild hip joint space reduction and minimal arthritis. No fracture. Started trial of Lyrica 75 mg twice a day and says it was increased to TID and is now out for about a week. Pt feels pain level 1/10. Pt feels that stairs, prolonged standing makes the pain worse, and lyrica, bobby makes the pain better. Pt feels her medication helps   her function and improve her quality of life. Pt admits to some Rt radiating numbness and tingling. Denies recent falls, injuries or trauma. Pt denies new weakness. Pt reports PT has been done. Review of Systems   Constitutional: Negative. Negative for fatigue. HENT: Negative. Negative for trouble swallowing. Eyes: Negative. Respiratory: Negative for cough and shortness of breath. Cardiovascular: Negative for chest pain. Gastrointestinal: Negative. Negative for constipation and diarrhea. Endocrine: Negative. Genitourinary: Negative. Musculoskeletal: Positive for back pain. Negative for neck pain. Skin: Negative. Neurological: Positive for weakness and numbness. Negative for dizziness and headaches. Hematological: Negative. Psychiatric/Behavioral: Negative. Reviewed Dr. Gaviria Expose notes and reports from 7/30/21:  MRI lumbar spine 12/29/2010: Contrast study. Degenerative disease and facet arthropathy. Postoperative changes L4 laminectomy, L3-4 screw and artie fusion on the left, L4-5 screw and artie fixation on the right. Retrolisthesis. Severe partial tear changes L4. No fracture. Prominent biliary ductal system and pancreatic duct. L1-L2 normal canal foramen. L2-L3 mild canal stenosis, mild bilateral foraminal stenosis. L3-L4 severe canal stenosis, severe left and moderate right foraminal stenosis. L4-L5 severe canal stenosis, severe left and mild right foraminal stenosis. L5-S1 normal canal, mild bilateral foraminal stenosis. XR LS spine 11/29/2010: Rods and screws fixing L3-L5. Degenerative disc disease and facet arthropathy.   Anterior subluxation L4. No fracture. Multiple air-fluid levels small bowel:, Clinical correlation  XR bilateral hip 11/29/2010: Hip and sacroiliac joints unremarkable. No fracture. ORT Score 4=moderate risk  Objective:     Vitals:  BP (!) 154/87   Pulse 74   Temp 97.3 °F (36.3 °C)   Ht 5' 5\" (1.651 m)   Wt 185 lb (83.9 kg)   BMI 30.79 kg/m² Pain Score:   1      Physical Exam  Vitals and nursing note reviewed. This is a pleasant female who answers questions appropriately and follows commands. Pt is alert and oriented x 3. Recent and remote memory is intact. Mood and affect, judgement and insight are normal.  No signs of distress, no dyspnea or SOB noted. HEENT: PERRL. Neck is supple, trachea midline. No lymphadenopathy noted. Decreased ROM with flexion and extension of low back. Non-tender with palpation to lumbar spine. Negative SLR. Wearing b/l AFO. Tightness in both hamstrings noted. Balance and coordination normal.  Surgical scar low back. Strength is functional for ambulation. Cranial nerves II-XII are intact. Assessment:      Diagnosis Orders   1. Lumbar radiculopathy  pregabalin (LYRICA) 75 MG capsule       Plan:     Periodic Controlled Substance Monitoring: No signs of potential drug abuse or diversion identified. (Clary Hummel, APRN - CNP)    Orders Placed This Encounter   Medications    pregabalin (LYRICA) 75 MG capsule     Sig: Take 1 capsule by mouth 2 times daily for 30 days. Dispense:  60 capsule     Refill:  0       No orders of the defined types were placed in this encounter. Discussed options with the patient today. Anatomic model pathology was shown and reviewed with pt. patient is doing well status post left L3-4 L4-5 transforaminal epidural.  We will continue the Lyrica 75 milligrams and use twice a day not 3 a day. She will started back up nightly and discussed using least amount possible if able. Hold injection at this time. All questions were answered. Discussed home exercise program.  Relevant imaging and pain generators reviewed. Pt verbalized understanding and agrees with above plan. Pt has chronic pain. Utox reviewed and appropriate from August 2021. ORT score 4 - moderate risk. Will continue medications for chronic pain that has been previously directed as they do help pt function with ADL and improve quality of life. OARRS was reviewed. This NP saw pt under direct supervision of Dr. Lui Tierney. Follow up:  Return in about 4 weeks (around 9/27/2021) for review meds and reassess pain.     Megan Gomez, APRN - CNP

## 2021-09-17 ENCOUNTER — TELEPHONE (OUTPATIENT)
Dept: PAIN MANAGEMENT | Age: 61
End: 2021-09-17

## 2021-09-17 DIAGNOSIS — Z98.1 HISTORY OF FUSION OF LUMBAR SPINE: Primary | ICD-10-CM

## 2021-09-17 DIAGNOSIS — R10.31 BILATERAL GROIN PAIN: ICD-10-CM

## 2021-09-17 DIAGNOSIS — M54.16 LUMBAR RADICULOPATHY: ICD-10-CM

## 2021-09-17 DIAGNOSIS — R10.32 BILATERAL GROIN PAIN: ICD-10-CM

## 2021-09-18 RX ORDER — PREGABALIN 100 MG/1
100 CAPSULE ORAL 2 TIMES DAILY
Qty: 60 CAPSULE | Refills: 0 | Status: SHIPPED | OUTPATIENT
Start: 2021-09-18 | End: 2021-09-29 | Stop reason: SDUPTHER

## 2021-09-18 NOTE — TELEPHONE ENCOUNTER
Underwent left lumbar TFESI L3-4 L4-5 on 8/24/2021. Seen in office on 8/30/2021, note reviewed. Lyrica continued 75 mg twice daily. XR L-spine 8/2/2021 reviewed, shows L3-L5 fusion and laminectomy, severe disc space loss above the level of fusion. I'm concerned for L2-3 junctional canal stenosis    -MRI LS spine with and without contrast eval L2-3 canal stenosis, history of L3-5 fusion  Encourage follow-up to evaluate other treatment options.  -Increase Lyrica 75 mg twice daily to 100 mg 2 times daily #60 no refill start 9/18/2021    Controlled Substance Monitoring:    Acute and Chronic Pain Monitoring:   RX Monitoring 9/18/2021   Periodic Controlled Substance Monitoring Obtaining appropriate analgesic effect of treatment.

## 2021-09-29 ENCOUNTER — OFFICE VISIT (OUTPATIENT)
Dept: PAIN MANAGEMENT | Age: 61
End: 2021-09-29
Payer: MEDICARE

## 2021-09-29 VITALS
HEIGHT: 65 IN | TEMPERATURE: 97.3 F | WEIGHT: 185 LBS | DIASTOLIC BLOOD PRESSURE: 66 MMHG | SYSTOLIC BLOOD PRESSURE: 126 MMHG | BODY MASS INDEX: 30.82 KG/M2

## 2021-09-29 DIAGNOSIS — Z98.1 HISTORY OF FUSION OF LUMBAR SPINE: Primary | ICD-10-CM

## 2021-09-29 DIAGNOSIS — M54.16 LUMBAR RADICULOPATHY: ICD-10-CM

## 2021-09-29 PROCEDURE — 99213 OFFICE O/P EST LOW 20 MIN: CPT | Performed by: NURSE PRACTITIONER

## 2021-09-29 RX ORDER — PREGABALIN 100 MG/1
100 CAPSULE ORAL 2 TIMES DAILY
Qty: 60 CAPSULE | Refills: 0 | Status: SHIPPED | OUTPATIENT
Start: 2021-10-18 | End: 2021-11-08 | Stop reason: SDUPTHER

## 2021-09-29 ASSESSMENT — ENCOUNTER SYMPTOMS
EYES NEGATIVE: 1
TROUBLE SWALLOWING: 0
DIARRHEA: 0
SHORTNESS OF BREATH: 0
COUGH: 0
BACK PAIN: 1
CONSTIPATION: 0
GASTROINTESTINAL NEGATIVE: 1

## 2021-09-29 NOTE — PROGRESS NOTES
Silas Ngo  ([id number])    9/29/2021    Subjective:     Silas Ngo is 56 y.o. 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 Social History     Socioeconomic History    Marital status: Single     Spouse name: Not on file    Number of children: Not on file    Years of education: Not on file    Highest education level: Not on file   Occupational History    Not on file   Tobacco Use    Smoking status: Former Smoker    Smokeless tobacco: Never Used   Substance and Sexual Activity    Alcohol use: Not on file    Drug use: Not on file    Sexual activity: Not on file   Other Topics Concern    Not on file   Social History Narrative    Not on file     Social Determinants of Health     Financial Resource Strain:     Difficulty of Paying Living Expenses:    Food Insecurity:     Worried About Running Out of Food in the Last Year:     [address] in the Last Year:    Transportation Needs:     Lack of Transportation (Medical):      Lack of Transportation (Non-Medical):    Physical Activity:     Days of Exercise per Week:     Minutes of Exercise per Session:    Stress:     Feeling of Stress :    Social Connections:     Frequency of Communication with Friends and Family:     Frequency of Social Gatherings with Friends and Family:     Attends [employer] Services:     Active Member of Clubs or Organizations:     Attends Club or Organization Meetings:     Marital Status:    Intimate Partner Violence:     Fear of Current or Ex-Partner:     arthritis. X-rays of left hip and low back ordered as well as physical therapy. Has a back brace if needed. X-ray report from 8-21 shows L3-5 fusion, laminectomy and severe to space loss above the level of fusion. X-ray report of left hip shows mild hip joint space reduction and minimal arthritis. No fracture. Lyrica 100 mg twice a day and says it was increased to TID and is now out for about a week.         Pt feels pain level 2/10. Pt feels that standing, washing dishes makes the pain worse, and sitting makes the pain better. Pt feels her medication helps   her function and improve her quality of life. Pt reports improved radiating numbness and tingling. Denies recent falls, injuries or trauma. Pt denies new weakness. Review of Systems   Constitutional: Negative. Negative for fatigue. HENT: Negative. Negative for trouble swallowing. Eyes: Negative. Respiratory: Negative for cough and shortness of breath. Cardiovascular: Negative for chest pain. Gastrointestinal: Negative. Negative for constipation and diarrhea. Endocrine: Negative. Genitourinary: Negative. Musculoskeletal: Positive for back pain and neck pain. Skin: Negative. Neurological: Negative for dizziness, weakness and headaches. Hematological: Negative. Psychiatric/Behavioral: Negative. Reviewed Dr. Hussein Dodd notes and reports from 7/30/21:  MRI lumbar spine 12/29/2010: Contrast study.  Degenerative disease and facet arthropathy.  Postoperative changes L4 laminectomy, L3-4 screw and artie fusion on the left, L4-5 screw and artie fixation on the right.  Retrolisthesis.  Severe partial tear changes L4.  No fracture.  Prominent biliary ductal system and pancreatic duct.  L1-L2 normal canal foramen.  L2-L3 mild canal stenosis, mild bilateral foraminal stenosis.  L3-L4 severe canal stenosis, severe left and moderate right foraminal stenosis.   L4-L5 severe canal stenosis, severe left and mild right foraminal stenosis.  L5-S1 normal canal, mild bilateral foraminal stenosis. XR LS spine 11/29/2010: Rods and screws fixing L3-L5.  Degenerative disc disease and facet arthropathy.  Anterior subluxation L4.  No fracture.  Multiple air-fluid levels small bowel:, Clinical correlation  XR bilateral hip 11/29/2010: Hip and sacroiliac joints unremarkable.  No fracture. ORT Score 4=moderate risk    Objective:     Vitals:  /66   Temp 97.3 °F (36.3 °C) (Infrared)   Ht 5' 5\" (1.651 m)   Wt 185 lb (83.9 kg)   BMI 30.79 kg/m² Pain Score:   1      Physical Exam  Vitals and nursing note reviewed. This is a pleasant female who answers questions appropriately and follows commands. Pt is alert and oriented x 3. Recent and remote memory is intact. Mood and affect, judgement and insight are normal.  No signs of distress, no dyspnea or SOB noted. HEENT: PERRL. Neck is supple, trachea midline. No lymphadenopathy noted. Decreased ROM with flexion and extension of low back. Non-tender with palpation to lumbar spine. Negative SLR but reproduces low back pain on the right. Wearing b/l AFO. Gait antalgic. Tightness in both hamstrings noted. Balance and coordination normal.  Surgical scar low back. Strength is functional for ambulation. Cranial nerves II-XII are intact.         Assessment:      Diagnosis Orders   1. History of fusion of lumbar spine     2. Lumbar radiculopathy  pregabalin (LYRICA) 100 MG capsule       Plan:     Periodic Controlled Substance Monitoring: No signs of potential drug abuse or diversion identified. (Clary Hummel, APRN - CNP)    Orders Placed This Encounter   Medications    pregabalin (LYRICA) 100 MG capsule     Sig: Take 1 capsule by mouth 2 times daily for 30 days. Dispense:  60 capsule     Refill:  0       No orders of the defined types were placed in this encounter. Discussed options with the patient today. Anatomic model pathology was shown and reviewed with pt.  She has increase Lyrica 100 mg twice a day we will continue this at this time. Hold injections at this time. Consideration of right L3-4 L4-5 epidural in the future. She is doing well at this time and not complaining of lower extremity radiculopathy or pain. Encouraged her to start the physical therapy as previously directed. Hold injections at this time. All questions were answered. Discussed home exercise program and  smoking cessation. Relevant imaging and pain generators reviewed. Pt verbalized understanding and agrees with above plan. Pt has chronic pain. Utox reviewed and appropriate from August 2021. ORT score 4 - moderate risk. Will continue medications for chronic pain that has been previously directed as they do help pt function with ADL and improve quality of life. OARRS was reviewed. This NP saw pt under direct supervision of Dr. Shania Perez. Follow up:  Return in about 6 weeks (around 11/10/2021) for review meds and reassess pain.     Yoni Gomez, APRN - CNP

## 2021-11-08 ENCOUNTER — OFFICE VISIT (OUTPATIENT)
Dept: PAIN MANAGEMENT | Age: 61
End: 2021-11-08
Payer: MEDICARE

## 2021-11-08 VITALS
WEIGHT: 185 LBS | DIASTOLIC BLOOD PRESSURE: 82 MMHG | TEMPERATURE: 98.1 F | SYSTOLIC BLOOD PRESSURE: 130 MMHG | BODY MASS INDEX: 30.82 KG/M2 | HEIGHT: 65 IN

## 2021-11-08 DIAGNOSIS — M54.16 LUMBAR RADICULOPATHY: ICD-10-CM

## 2021-11-08 PROCEDURE — 99213 OFFICE O/P EST LOW 20 MIN: CPT | Performed by: NURSE PRACTITIONER

## 2021-11-08 RX ORDER — PREGABALIN 100 MG/1
100 CAPSULE ORAL 2 TIMES DAILY
Qty: 60 CAPSULE | Refills: 0 | Status: SHIPPED | OUTPATIENT
Start: 2021-11-15 | End: 2021-12-13 | Stop reason: SDUPTHER

## 2021-11-08 ASSESSMENT — ENCOUNTER SYMPTOMS
DIARRHEA: 0
TROUBLE SWALLOWING: 0
SHORTNESS OF BREATH: 0
BACK PAIN: 0
GASTROINTESTINAL NEGATIVE: 1
CONSTIPATION: 0
COUGH: 0
EYES NEGATIVE: 1

## 2021-11-08 NOTE — PROGRESS NOTES
Jeryl Siemens  ([id number])    11/8/2021    Subjective:     Jeryl Siemens is 56 y.o. 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 No pertinent family history. Social History     Socioeconomic History    Marital status: Single     Spouse name: Not on file    Number of children: Not on file    Years of education: Not on file    Highest education level: Not on file   Occupational History    Not on file   Tobacco Use    Smoking status: Former Smoker    Smokeless tobacco: Never Used   Substance and Sexual Activity    Alcohol use: Not on file    Drug use: Not on file    Sexual activity: Not on file   Other Topics Concern    Not on file   Social History Narrative    Not on file     Social Determinants of Health     Financial Resource Strain:     Difficulty of Paying Living Expenses: Not on file   Food Insecurity:     Worried About Running Out of Food in the Last Year: Not on file    [place] of Food in the Last Year: Not on file   Transportation Needs:     Lack of Transportation (Medical): Not on file    Lack of Transportation (Non-Medical):  Not on file   Physical Activity:     Days of Exercise per Week: Not on file    Minutes of Exercise per Session: Not on file   Stress:     Feeling of Stress : Not on file   Social Connections:     Frequency of Communication with Friends and Family: Not on file    Frequency of Social Gatherings with Friends and Family: Not on file    Attends Yarsani Services: Not on file    Active Member of Clubs or Organizations: Not on file    Attends Club or Organization Meetings: Not on file    Marital Status: Not on file   Intimate Partner Violence:     Fear of Current or Ex-Partner: Not on file    Emotionally Abused: Not on file    Physically Abused: Not on file    Sexually Abused: Not on file   Housing Stability:     Unable to Pay for Housing in the Last Year: Not on file    Number of [place] in the Last Year: Not on file    Unstable Housing in the Last Year: Not on file       Current Outpatient Medications on File Prior to Visit   Medication Sig Dispense Refill    Tofacitinib Citrate ER (XELJANZ XR) 11 MG TB24       lidocaine (LMX) 4 % cream Apply a half dollar sized amount to intact skin topically up to twice daily as needed for pain 1 Tube 1    levothyroxine (SYNTHROID) 25 MCG tablet Take by mouth      atenolol (TENORMIN) 50 MG tablet Take 50 mg by mouth daily      leflunomide (ARAVA) 20 MG tablet Take 20 mg by mouth daily      lovastatin (MEVACOR) 40 MG tablet Take 40 mg by mouth nightly       Current Facility-Administered Medications on File Prior to Visit   Medication Dose Route Frequency Provider Last Rate Last Admin    iopamidol (ISOVUE-370) 76 % injection 1 mL  1 mL Other ONCE PRN Jan Jenkins MD               Pt presents today for a f/u of her pain. PCP is Dr. Barbara Granados MD.  Patient last saw this NP. Lyrica increased to 100mg BID on 9/17/21 d/t increased Rt groin/leg pain and says she hasn't been having pain. She did start PT and feels going ok. On 8/24/2021 had left L3-4, L4-5 transforaminal epidural which has already started to relief >80% pain relief and says she continues to have relief. She had a MRI ordered in September d/t worsening pain, but says she isn't interested in the as her pain in LE better. Had EMG done on 8/5/2021 for the lower extremities and report was abnormal showing left S1 lumbosacral motor radiculopathy and evidence for left L4-5 lumbosacral motor radiculopathy as well as left peroneal motor neuropathy.   Her initial consult was on 7/30/2021 for the left-sided low back pain and left groin pain into her thigh. She has had lumbar spine surgeries to fusions with Dr. Yamileth Vivar in 2006 and 2011. History of rheumatoid arthritis. X-rays of left hip and low back ordered as well as physical therapy. Has a back brace if needed. X-ray report from 8-21 shows L3-5 fusion, laminectomy and severe to space loss above the level of fusion. X-ray report of left hip shows mild hip joint space reduction and minimal arthritis. No fracture.     Lyrica 100 mg twice a day.       Pt feels pain level 1-2/10. Pt feels that getting shoes on, prolonged activity makes the pain worse, and stretching, medication makes the Sx better. Pt feels her medication helps   her function and improve her quality of life. Pt denies radiating numbness and tingling. Denies recent falls, injuries or trauma. Pt denies new weakness. Pt reports PT has been started. Review of Systems   Constitutional: Negative. Negative for fatigue. HENT: Negative. Negative for trouble swallowing. Eyes: Negative. Respiratory: Negative for cough and shortness of breath. Cardiovascular: Negative for chest pain. Gastrointestinal: Negative. Negative for constipation and diarrhea. Endocrine: Negative. Genitourinary: Negative. Musculoskeletal: Negative for back pain and neck pain. Skin: Negative. Neurological: Negative for dizziness, weakness and headaches. Hematological: Negative. Psychiatric/Behavioral: Negative. Reviewed Dr. Chantel Brandon notes and reports from 7/30/21:  MRI lumbar spine 12/29/2010: Contrast study.  Degenerative disease and facet arthropathy.  Postoperative changes L4 laminectomy, L3-4 screw and artie fusion on the left, L4-5 screw and artie fixation on the right.  Retrolisthesis.  Severe partial tear changes L4.  No fracture.  Prominent biliary ductal system and pancreatic duct.   L1-L2 normal canal foramen.  L2-L3 mild canal encounter. Discussed options with the patient today. Anatomic model pathology was shown and reviewed with pt. At this time we will continue the Lyrica 100 mg twice a day. Discussed trying to reduce to daily, however patient feels this dose is working well for her and has not had radicular symptoms or pain while on it. She does feel the epidural continues to offer relief. She is not wanting to get a lumbar MRI at this time as she is doing better. Hold injections at this time. All questions were answered. Discussed home exercise program.  Relevant imaging and pain generators reviewed. Pt verbalized understanding and agrees with above plan. Pt has chronic pain. Will continue medications for chronic pain that has been previously directed as they do help pt function with ADL and improve quality of life. OARRS was reviewed. This NP saw pt under direct supervision of Dr. Catalina Peoples. Follow up:  Return in about 5 weeks (around 12/13/2021) for review meds and reassess pain.     Agnieszka Gomez, APRN - CNP

## 2021-12-13 ENCOUNTER — OFFICE VISIT (OUTPATIENT)
Dept: PAIN MANAGEMENT | Age: 61
End: 2021-12-13
Payer: MEDICARE

## 2021-12-13 VITALS
DIASTOLIC BLOOD PRESSURE: 86 MMHG | BODY MASS INDEX: 30.82 KG/M2 | WEIGHT: 185 LBS | HEIGHT: 65 IN | SYSTOLIC BLOOD PRESSURE: 122 MMHG | TEMPERATURE: 97.3 F

## 2021-12-13 DIAGNOSIS — M54.16 LUMBAR RADICULOPATHY: ICD-10-CM

## 2021-12-13 PROBLEM — M20.5X2 CONTRACTURE OF TOE OF LEFT FOOT: Status: ACTIVE | Noted: 2020-11-18

## 2021-12-13 PROBLEM — E21.3 HYPERPARATHYROIDISM (HCC): Status: ACTIVE | Noted: 2021-12-13

## 2021-12-13 PROBLEM — M21.371 BILATERAL FOOT-DROP: Status: ACTIVE | Noted: 2020-11-18

## 2021-12-13 PROBLEM — M21.372 BILATERAL FOOT-DROP: Status: ACTIVE | Noted: 2020-11-18

## 2021-12-13 PROCEDURE — 99213 OFFICE O/P EST LOW 20 MIN: CPT | Performed by: NURSE PRACTITIONER

## 2021-12-13 RX ORDER — PREGABALIN 100 MG/1
100 CAPSULE ORAL 2 TIMES DAILY
Qty: 60 CAPSULE | Refills: 0 | Status: SHIPPED | OUTPATIENT
Start: 2021-12-15 | End: 2022-01-17 | Stop reason: SDUPTHER

## 2021-12-13 ASSESSMENT — ENCOUNTER SYMPTOMS
DIARRHEA: 0
GASTROINTESTINAL NEGATIVE: 1
COUGH: 0
BACK PAIN: 1
SHORTNESS OF BREATH: 0
TROUBLE SWALLOWING: 0
EYES NEGATIVE: 1
CONSTIPATION: 0

## 2021-12-13 NOTE — PROGRESS NOTES
Parker Shelley  ([id number])    2021    Subjective:     Parker Shelley is 59 y.o. 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 No pertinent family history. Social History     Socioeconomic History    Marital status: Single     Spouse name: Not on file    Number of children: Not on file    Years of education: Not on file    Highest education level: Not on file   Occupational History    Not on file   Tobacco Use    Smoking status: Former Smoker     Packs/day: 0.50     Years: 40.00     Pack years: 20.00     Quit date: 3/19/2020     Years since quittin.7    Smokeless tobacco: Never Used   Substance and Sexual Activity    Alcohol use: Never    Drug use: Never    Sexual activity: Not on file   Other Topics Concern    Not on file   Social History Narrative    Not on file     Social Determinants of Health     Financial Resource Strain:     Difficulty of Paying Living Expenses: Not on file   Food Insecurity:     Worried About [address] in the Last Year: Not on file    [address] in the Last Year: Not on file   Transportation Needs:     Lack of Transportation (Medical): Not on file    Lack of Transportation (Non-Medical):  Not on file   Physical Activity:     Days of Exercise per Week: Not on file    Minutes of Exercise per Session: Not on file   Stress:     Feeling of Stress : Not on file   Social Connections:     Frequency of Communication with Friends and Family: Not on file    Frequency of Social Gatherings with Friends and Family: Not on file    Attends [employer] Services: Not on file    Active Member of Clubs or Organizations: Not on file    Attends Club or Organization Meetings: Not on file    Marital Status: Not on file   Intimate Partner Violence:     Fear of Current or Ex-Partner: Not on file    Emotionally Abused: Not on file    Physically Abused: Not on file    Sexually Abused: Not on file   Housing Stability:     Unable to Pay for Housing in the Last Year: Not on file    Number of [place] in the Last Year: Not on file    Unstable Housing in the Last Year: Not on file       Current Outpatient Medications on File Prior to Visit   Medication Sig Dispense Refill    Tofacitinib Citrate ER (XELJANZ XR) 11 MG TB24       lidocaine (LMX) 4 % cream Apply a half dollar sized amount to intact skin topically up to twice daily as needed for pain 1 Tube 1    levothyroxine (SYNTHROID) 25 MCG tablet Take by mouth      atenolol (TENORMIN) 50 MG tablet Take 50 mg by mouth daily      leflunomide (ARAVA) 20 MG tablet Take 20 mg by mouth daily      lovastatin (MEVACOR) 40 MG tablet Take 40 mg by mouth nightly       Current Facility-Administered Medications on File Prior to Visit   Medication Dose Route Frequency Provider Last Rate Last Admin    iopamidol (ISOVUE-370) 76 % injection 1 mL  1 mL Other ONCE PRN Alejo Hastings MD               Pt presents today for a f/u of her pain. PCP is Dr. Barbara Granados MD.  Patient last saw this NP. She has back pain at times. She says it isn't constant. Says will hurt right in the center of her waistline. Says pain makes her sit. Better with sitting. Denies injury or fall. On 8/24/2021 had left L3-4, L4-5 transforaminal epidural offered significant pain relief and says she continues to have relief - says sitting causes RLS Sx. Still doing PT until end of the year she reports \"that's helping - I can feel my legs getting stronger\".    She had a MRI ordered in September d/t worsening pain, but says she isn't interested in the as her pain in LE better. Had EMG done on 8/5/2021 for the lower extremities and report was abnormal showing left S1 lumbosacral motor radiculopathy and evidence for left L4-5 lumbosacral motor radiculopathy as well as left peroneal motor neuropathy. Her initial consult was on 7/30/2021 for the left-sided low back pain and left groin pain into her thigh. She has had lumbar spine surgeries to fusions with Dr. Zhao Akhtar in 2006 and 2011. History of rheumatoid arthritis. X-rays of left hip and low back ordered as well as physical therapy. Has a back brace if needed. X-ray report from 8-21 shows L3-5 fusion, laminectomy and severe to space loss above the level of fusion. X-ray report of left hip shows mild hip joint space reduction and minimal arthritis. No fracture.     Lyrica 100 mg twice a day. Pt feels pain level \"good today\". Pt feels that stairs, prolonged standing, weather change makes the pain worse, and sitting, medication makes the pain better. Pt feels her medication helps   her function and improve her quality of life. Pt denies radiating numbness and tingling. Denies recent falls, injuries or trauma. Pt denies new weakness. Pt reports PT has been done. Review of Systems   Constitutional: Negative. Negative for fatigue. HENT: Negative. Negative for trouble swallowing. Eyes: Negative. Respiratory: Negative for cough and shortness of breath. Cardiovascular: Negative for chest pain. Gastrointestinal: Negative. Negative for constipation and diarrhea. Endocrine: Negative. Genitourinary: Negative. Musculoskeletal: Positive for back pain and neck pain. Skin: Negative. Neurological: Negative for dizziness, weakness and headaches. Hematological: Negative. Psychiatric/Behavioral: Negative.         Reviewed Dr. Thomas Murphy notes and reports from 7/30/21:  MRI lumbar spine 12/29/2010: Contrast study.  Degenerative disease and facet arthropathy.  Postoperative changes L4 (FELIBERTO Chong CNP)    Orders Placed This Encounter   Medications    pregabalin (LYRICA) 100 MG capsule     Sig: Take 1 capsule by mouth 2 times daily for 30 days. Dispense:  60 capsule     Refill:  0       No orders of the defined types were placed in this encounter. Discussed options with the patient today. Anatomic model pathology was shown and reviewed with pt. Discussed considering Lumbar MBB vs facet joint injections - she isn't wanting injections at this time. Continue PT exercises. Will continue the Lyrica 100 mg twice a day. Discussed trying to reduce to daily, however patient feels this dose is working well for her and has not had radicular symptoms or pain while on it. She does feel the epidural continues to offer relief. She is not wanting to get a lumbar MRI at this time All questions were answered. Discussed home exercise program.  Relevant imaging and pain generators reviewed. Pt verbalized understanding and agrees with above plan. Pt has chronic pain. Will continue medications for chronic pain that has been previously directed as they do help pt function with ADL and improve quality of life. OARRS was reviewed. This NP saw pt under direct supervision of Dr. Shaun Marcus. Follow up:  Return in about 4 weeks (around 1/10/2022) for review meds and reassess pain.     FELIBERTO Love CNP

## 2022-01-17 ENCOUNTER — VIRTUAL VISIT (OUTPATIENT)
Dept: PAIN MANAGEMENT | Age: 62
End: 2022-01-17
Payer: MEDICARE

## 2022-01-17 DIAGNOSIS — Z98.1 HISTORY OF FUSION OF LUMBAR SPINE: Primary | ICD-10-CM

## 2022-01-17 DIAGNOSIS — M54.16 LUMBAR RADICULOPATHY: ICD-10-CM

## 2022-01-17 PROCEDURE — 99442 PR PHYS/QHP TELEPHONE EVALUATION 11-20 MIN: CPT | Performed by: NURSE PRACTITIONER

## 2022-01-17 RX ORDER — PREGABALIN 100 MG/1
100 CAPSULE ORAL 2 TIMES DAILY
Qty: 60 CAPSULE | Refills: 0 | Status: SHIPPED | OUTPATIENT
Start: 2022-01-17 | End: 2022-04-04

## 2022-01-17 ASSESSMENT — ENCOUNTER SYMPTOMS
BACK PAIN: 1
SHORTNESS OF BREATH: 0
CONSTIPATION: 0
EYES NEGATIVE: 1
DIARRHEA: 0
TROUBLE SWALLOWING: 0
COUGH: 0
GASTROINTESTINAL NEGATIVE: 1

## 2022-01-17 NOTE — PROGRESS NOTES
Omari Virgen  ()    2022    TELEHEALTH EVALUATION -- Audio Only (During HDXFL-89 public health emergency)    Due to [place] 19 outbreak, patient's office visit was converted to a virtual visit. Patient was contacted and agreed to proceed with a audio only telehealth service. Patient understands that this encounter is a billable visit and that insurance coverage and co-pays are up to their individual insurance plans. At the beginning of this telehealth encounter, I verified with the patient their name and date of birth. Verbal consent for telehealth encounter was obtained. Subjective:       Chief Complaint   Patient presents with    Back Pain       Omari Virgen is 59 y.o. 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 No pertinent family history.   Social History     Socioeconomic History    Marital status: Single     Spouse name: Not on file    Number of children: Not on file    Years of education: Not on file    Highest education level: Not on file   Occupational History    Not on file   Tobacco Use    Smoking status: Former Smoker     Packs/day: 0.50     Years: 40.00     Pack years: 20.00     Quit date: 3/19/2020     Years since quittin.8    Smokeless tobacco: Never Used   Substance and Sexual Activity    Alcohol use: Never    Drug use: Never    Sexual activity: Not on file   Other Topics Concern    Not on file   Social History Narrative    Not on file     Social Determinants of Health     Financial Resource Strain:     Difficulty of Paying Living Expenses: Not on file   Food Insecurity:     Worried About Running Out of Food in the Last Year: Not on file    Ran Out of Food in the Last Year: Not on file   Transportation Needs:     Lack of Transportation (Medical): Not on file    Lack of Transportation (Non-Medical): Not on file   Physical Activity:     Days of Exercise per Week: Not on file    Minutes of Exercise per Session: Not on file   Stress:     Feeling of Stress : Not on file   Social Connections:     Frequency of Communication with Friends and Family: Not on file    Frequency of Social Gatherings with Friends and Family: Not on file    Attends Jehovah's witness Services: Not on file    Active Member of [address] [place] [address] [employer] or Organizations: Not on file    Attends Club or Organization Meetings: Not on file    Marital Status: Not on file   Intimate Partner Violence:     Fear of Current or Ex-Partner: Not on file    Emotionally Abused: Not on file    Physically Abused: Not on file    Sexually Abused: Not on file   Housing Stability:     Unable to Pay for Housing in the Last Year: Not on file    Number of [place] in the Last Year: Not on file    Unstable Housing in the Last Year: Not on file       Current Outpatient Medications on File Prior to Visit   Medication Sig Dispense Refill    Tofacitinib Citrate ER (XELJANZ XR) 11 MG TB24       lidocaine (LMX) 4 % cream Apply a half dollar sized amount to intact skin topically up to twice daily as needed for pain 1 Tube 1    levothyroxine (SYNTHROID) 25 MCG tablet Take by mouth      atenolol (TENORMIN) 50 MG tablet Take 50 mg by mouth daily      leflunomide (ARAVA) 20 MG tablet Take 20 mg by mouth daily      lovastatin (MEVACOR) 40 MG tablet Take 40 mg by mouth nightly       Current Facility-Administered Medications on File Prior to Visit   Medication Dose Route Frequency Provider Last Rate Last Admin    iopamidol (ISOVUE-370) 76 % injection 1 mL  1 mL Other ONCE PRN Jean Paul Ayers MD             Pt's f/u done today with a telehealth visit for her pain d/t Covid 19 pandemic. PCP is Dr. Joan Hammonds MD.  Patient last saw this NP. She says she is snowed in and cannot clear her driveway. She has back pain at times. She says it isn't constant. Says will hurt right in the center of her waistline. Says pain makes her sit. Better with sitting. Denies injury or fall. On 8/24/2021 had left L3-4, L4-5 transforaminal epidural offered significant pain relief and says she continues to have relief - says sitting causes RLS Sx. Still doing PT until end of the year she reports \"that's helping - I can feel my legs getting stronger\". She had a MRI ordered in September d/t worsening pain, but says she isn't interested in the as her pain in LE better. Had EMG done on 8/5/2021 for the lower extremities and report was abnormal showing left S1 lumbosacral motor radiculopathy and evidence for left L4-5 lumbosacral motor radiculopathy as well as left peroneal motor neuropathy. Her initial consult was on 7/30/2021 for the left-sided low back pain and left groin pain into her thigh. She has had lumbar spine surgeries to fusions with Dr. Danika Ramirez in 2006 and 2011. History of rheumatoid arthritis. X-rays of left hip and low back ordered as well as physical therapy. Has a back brace if needed. X-ray report from 8-21 shows L3-5 fusion, laminectomy and severe to space loss above the level of fusion. X-ray report of left hip shows mild hip joint space reduction and minimal arthritis. No fracture.     Lyrica 100 mg twice a day.       Pt feels pain level 2-3/10. Pt feels that standing, chores/housework, vacuum, laundry, stairs makes the pain worse, and medication, rest makes the pain better. Pt feels her medication helps   her function and improve her quality of life. Pt denies radiating numbness and tingling, getting numbness in fingertips. LE \"ok\". Denies recent falls, injuries or trauma. Pt denies new weakness.  Pt reports PT has been done in the past.           Review of Systems   Constitutional: Negative. Negative for fatigue. HENT: Negative. Negative for trouble swallowing. Eyes: Negative. Respiratory: Negative for cough and shortness of breath. Cardiovascular: Negative for chest pain. Gastrointestinal: Negative. Negative for constipation and diarrhea. Endocrine: Negative. Genitourinary: Negative. Musculoskeletal: Positive for back pain. Negative for neck pain. Skin: Negative. Neurological: Positive for numbness. Negative for dizziness, weakness and headaches. Hematological: Negative. Psychiatric/Behavioral: Negative. Reviewed Dr. Shmuel Abarca notes and reports from 7/30/21:  MRI lumbar spine 12/29/2010: Contrast study.  Degenerative disease and facet arthropathy.  Postoperative changes L4 laminectomy, L3-4 screw and artie fusion on the left, L4-5 screw and artie fixation on the right.  Retrolisthesis.  Severe partial tear changes L4.  No fracture.  Prominent biliary ductal system and pancreatic duct.  L1-L2 normal canal foramen.  L2-L3 mild canal stenosis, mild bilateral foraminal stenosis.  L3-L4 severe canal stenosis, severe left and moderate right foraminal stenosis.  L4-L5 severe canal stenosis, severe left and mild right foraminal stenosis.  L5-S1 normal canal, mild bilateral foraminal stenosis. XR LS spine 11/29/2010: Rods and screws fixing L3-L5.  Degenerative disc disease and facet arthropathy.  Anterior subluxation L4.  No fracture.  Multiple air-fluid levels small bowel:, Clinical correlation  XR bilateral hip 11/29/2010: Hip and sacroiliac joints unremarkable.  No fracture. ORT Score 4=moderate risk  Objective:     Vitals: There were no vitals taken for this visit.      PHYSICAL EXAMINATION:    [x] Alert  [x] Oriented to person/place/time  [x] No apparent distress      [x] Mood and affect normal  [x] Recent and remote memory intact  [x] Judgement and insight normal     [] OTHER:      Due to this being a TeleHealth encounter, evaluation of the following organ through an audio discussion to substitute for in-person clinic visit.

## 2022-02-15 ENCOUNTER — OFFICE VISIT (OUTPATIENT)
Dept: PAIN MANAGEMENT | Age: 62
End: 2022-02-15
Payer: MEDICARE

## 2022-02-15 VITALS
DIASTOLIC BLOOD PRESSURE: 86 MMHG | TEMPERATURE: 97 F | WEIGHT: 185 LBS | BODY MASS INDEX: 30.82 KG/M2 | SYSTOLIC BLOOD PRESSURE: 122 MMHG | HEIGHT: 65 IN

## 2022-02-15 DIAGNOSIS — M54.16 LUMBAR RADICULOPATHY: ICD-10-CM

## 2022-02-15 PROCEDURE — 99213 OFFICE O/P EST LOW 20 MIN: CPT | Performed by: NURSE PRACTITIONER

## 2022-02-15 RX ORDER — PREGABALIN 100 MG/1
100 CAPSULE ORAL 2 TIMES DAILY
Qty: 60 CAPSULE | Refills: 0 | Status: CANCELLED | OUTPATIENT
Start: 2022-02-16 | End: 2022-03-18

## 2022-02-15 ASSESSMENT — ENCOUNTER SYMPTOMS
GASTROINTESTINAL NEGATIVE: 1
BACK PAIN: 1
EYES NEGATIVE: 1
DIARRHEA: 0
SHORTNESS OF BREATH: 0
TROUBLE SWALLOWING: 0
COUGH: 0
CONSTIPATION: 0

## 2022-02-15 NOTE — PROGRESS NOTES
Michael Haney  ()    2/15/2022    Subjective:     Michael Haney is 59 y.o. 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 No pertinent family history. Social History     Socioeconomic History    Marital status: Single     Spouse name: Not on file    Number of children: Not on file    Years of education: Not on file    Highest education level: Not on file   Occupational History    Not on file   Tobacco Use    Smoking status: Former Smoker     Packs/day: 0.50     Years: 40.00     Pack years: 20.00     Quit date: 3/19/2020     Years since quittin.9    Smokeless tobacco: Never Used   Substance and Sexual Activity    Alcohol use: Never    Drug use: Never    Sexual activity: Not on file   Other Topics Concern    Not on file   Social History Narrative    Not on file     Social Determinants of Health     Financial Resource Strain:     Difficulty of Paying Living Expenses: Not on file   Food Insecurity:     Worried About [address] in the Last Year: Not on file    [address] in the Last Year: Not on file   Transportation Needs:     Lack of Transportation (Medical): Not on file    Lack of Transportation (Non-Medical):  Not on file   Physical Activity:     Days of Exercise per Week: Not on file    Minutes of Exercise per Session: Not on file   Stress:     Feeling of Stress : Not on file   Social Connections:     Frequency of Communication with Friends and Family: Not on file    Frequency of Social Gatherings with Friends and Family: Not on file    Attends Voodoo Services: Not on file    Active Member of Clubs or Organizations: Not on file    Attends Club or Organization Meetings: Not on file    Marital Status: Not on file   Intimate Partner Violence:     Fear of Current or Ex-Partner: Not on file    Emotionally Abused: Not on file    Physically Abused: Not on file    Sexually Abused: Not on file   Housing Stability:     Unable to Pay for Housing in the Last Year: Not on file    Number of [place] in the Last Year: Not on file    Unstable Housing in the Last Year: Not on file       Current Outpatient Medications on File Prior to Visit   Medication Sig Dispense Refill    pregabalin (LYRICA) 100 MG capsule Take 1 capsule by mouth 2 times daily for 30 days. 60 capsule 0    Tofacitinib Citrate ER (XELJANZ XR) 11 MG TB24       lidocaine (LMX) 4 % cream Apply a half dollar sized amount to intact skin topically up to twice daily as needed for pain 1 Tube 1    levothyroxine (SYNTHROID) 25 MCG tablet Take by mouth      atenolol (TENORMIN) 50 MG tablet Take 50 mg by mouth daily      leflunomide (ARAVA) 20 MG tablet Take 20 mg by mouth daily      lovastatin (MEVACOR) 40 MG tablet Take 40 mg by mouth nightly       Current Facility-Administered Medications on File Prior to Visit   Medication Dose Route Frequency Provider Last Rate Last Admin    iopamidol (ISOVUE-370) 76 % injection 1 mL  1 mL Other ONCE PRN Otto Braxton MD               Pt presents today for a f/u of her pain. PCP is Dr. Gisela Romo MD.  Patient last saw this NP. She has tried to reduce her lyrica 100mg daily and feels the same. She says she would like to get off of it if possible. She has back pain at times. She says it isn't constant. Says will hurt right in the center of her waistline. Says pain makes her sit. Better with sitting. On 8/24/2021 had left L3-4, L4-5 transforaminal epidural offered significant pain relief and says she continues to have relief - says sitting causes RLS Sx.  Has completed PT which helped with her strength. She had a MRI ordered in September d/t worsening pain, but says she isn't interested in the as her pain in LE better. Had EMG done on 8/5/2021 for the lower extremities and report was abnormal showing left S1 lumbosacral motor radiculopathy and evidence for left L4-5 lumbosacral motor radiculopathy as well as left peroneal motor neuropathy. Her initial consult was on 7/30/2021 for the left-sided low back pain and left groin pain into her thigh. She has had lumbar spine surgeries to fusions with Dr. Alice Brown in 2006 and 2011. History of rheumatoid arthritis. X-rays of left hip and low back ordered as well as physical therapy. Has a back brace if needed. X-ray report from 8-21 shows L3-5 fusion, laminectomy and severe to space loss above the level of fusion. X-ray report of left hip shows mild hip joint space reduction and minimal arthritis. No fracture. Pt feels pain level today is 0/10.       Now using Lyrica 100 mg daily.               Review of Systems   Constitutional: Negative. Negative for fatigue. HENT: Negative. Negative for trouble swallowing. Eyes: Negative. Respiratory: Negative for cough and shortness of breath. Cardiovascular: Negative for chest pain. Gastrointestinal: Negative. Negative for constipation and diarrhea. Endocrine: Negative. Genitourinary: Negative. Musculoskeletal: Positive for back pain and neck pain. Skin: Negative. Neurological: Negative for dizziness, weakness and headaches. Hematological: Negative. Psychiatric/Behavioral: Negative.         Reviewed Dr. Hetal Bates notes and reports from 7/30/21:  MRI lumbar spine 12/29/2010: Contrast study.  Degenerative disease and facet arthropathy.  Postoperative changes L4 laminectomy, L3-4 screw and artie fusion on the left, L4-5 screw and artie fixation on the right.  Retrolisthesis.  Severe partial tear changes L4.  No fracture.  Prominent biliary ductal system and pancreatic duct.  L1-L2 normal canal foramen.  L2-L3 mild canal stenosis, mild bilateral foraminal stenosis.  L3-L4 severe canal stenosis, severe left and moderate right foraminal stenosis.  L4-L5 severe canal stenosis, severe left and mild right foraminal stenosis.  L5-S1 normal canal, mild bilateral foraminal stenosis. XR LS spine 11/29/2010: Rods and screws fixing L3-L5.  Degenerative disc disease and facet arthropathy.  Anterior subluxation L4.  No fracture.  Multiple air-fluid levels small bowel:, Clinical correlation  XR bilateral hip 11/29/2010: Hip and sacroiliac joints unremarkable.  No fracture. ORT Score 4=moderate risk  Objective:     Vitals:  /86   Temp 97 °F (36.1 °C)   Ht 5' 5\" (1.651 m)   Wt 185 lb (83.9 kg)   BMI 30.79 kg/m²        Physical Exam  Vitals and nursing note reviewed. This is a pleasant female who answers questions appropriately and follows commands.  Pt is alert and oriented x 3.  Recent and remote memory is intact.  Mood and affect, judgement and insight are normal.  No signs of distress, no dyspnea or SOB noted. HEENT: PERRL.  Neck is supple, trachea midline.  No lymphadenopathy noted.   Decreased ROM with flexion and extension of low back.   Negative SLR but reproduces low back pain. Wearing b/l AFO.    Gait antalgic. Tightness in both hamstrings noted. Balance and coordination normal.  Surgical scar low back. Strength is functional for ambulation. Cranial nerves II-XII are intact        Assessment:      Diagnosis Orders   1. Lumbar radiculopathy         Plan:     Periodic Controlled Substance Monitoring: No signs of potential drug abuse or diversion identified. (Clary Hummel, APRN - CNP)    No orders of the defined types were placed in this encounter. No orders of the defined types were placed in this encounter. Discussed options with the patient today. Anatomic model pathology was shown and reviewed with pt.   She has enough Lyrica 100mg as she is using this daily - will try to wean off and she will use every other day for 2 weeks then every 3 days until off. She may need to restart - will re evaluate after she has discontinued. She agrees with plan. Will continue the Lyrica 100 mg twice a day.   She does feel the epidural continues to offer relief. All questions were answered. Discussed home exercise program.  Relevant imaging and pain generators reviewed. Pt verbalized understanding and agrees with above plan. Pt has chronic pain. OARRS was reviewed. This NP saw pt under direct supervision of Dr. Shawanda Wade. Follow up:  Return in about 4 weeks (around 3/15/2022) for review meds and reassess pain.     Thanh Gomez, FELIBERTO - CNP

## 2022-04-04 ENCOUNTER — OFFICE VISIT (OUTPATIENT)
Dept: PAIN MANAGEMENT | Age: 62
End: 2022-04-04
Payer: MEDICARE

## 2022-04-04 ENCOUNTER — TELEPHONE (OUTPATIENT)
Dept: PAIN MANAGEMENT | Age: 62
End: 2022-04-04

## 2022-04-04 VITALS — BODY MASS INDEX: 29.66 KG/M2 | TEMPERATURE: 97.5 F | HEIGHT: 65 IN | WEIGHT: 178 LBS

## 2022-04-04 DIAGNOSIS — M47.817 LUMBOSACRAL SPONDYLOSIS WITHOUT MYELOPATHY: Primary | ICD-10-CM

## 2022-04-04 DIAGNOSIS — M79.605 LEFT LEG PAIN: ICD-10-CM

## 2022-04-04 PROCEDURE — 99213 OFFICE O/P EST LOW 20 MIN: CPT | Performed by: PHYSICAL MEDICINE & REHABILITATION

## 2022-04-04 RX ORDER — PREGABALIN 50 MG/1
50 CAPSULE ORAL 2 TIMES DAILY
Qty: 60 CAPSULE | Refills: 0 | Status: SHIPPED | OUTPATIENT
Start: 2022-04-04 | End: 2022-05-04

## 2022-04-04 ASSESSMENT — ENCOUNTER SYMPTOMS
CONSTIPATION: 0
NAUSEA: 0
SHORTNESS OF BREATH: 0
BACK PAIN: 1
DIARRHEA: 0

## 2022-04-04 NOTE — TELEPHONE ENCOUNTER
She may go to Dr Winsome Whitt MD, PhD  [facility]. [address] [facility]  [phone]

## 2022-04-04 NOTE — PROGRESS NOTES
Dimple Habermann  ([id number])    4/4/2022    Subjective:     Dimple Habermann is 59 y.o. female who complains today of:    Chief Complaint   Patient presents with    Back Pain        Last seen by me 7/30/2021, last seen 2/15/2022: EMG and XR LS spine and XR L hip done, reviewed below. Underwent left lumbar L3-4 L4-5 transforaminal epidural steroid injection on 8/24/2021 with greater than 95% pain relief. \"I am glad the pain went away, I am feeling no problems. \"  It is been easier for her to take stairs and go walking. Has been doing well with Lyrica although she feels like her pain is improved to the extent that she no longer needs this. Plan was to wean off and discontinue Lyrica last month, however the patient did not understand this plan and is continue to take the medicine every other day. She is interested in trying to reduce the Lyrica at this time. She completed physical therapy which she says it helped. She never saw hepatology. She does not want to have MRI despite the risks of paralysis bowel bladder sexual dysfunction wheelchair dependence and death. No other test therapy updates from other physicians, no emergency room visits. Lyrica 100 mg twice daily helps with remaining functional with chores, personal hygiene, remaining compliant with home exercise program, maintaining her quality of life, and performing activities of daily living. She obtains greater than 50% pain relief without any significant side effects. She is clear to avoid driving or operating heavy machinery or to perform any activities where she may harm herself or others while on pain medications. Pain is a 1/10. Gets to a 5/10. Affects both sides of her low back. No leg pain at this time. Worse with walking. Better with rest. Constant ache for over 1 year. History of L3-5 fusion with Dr Vanessa Gandara. There are no other associated symptoms or contextual factors.  She denies any classic radicular symptoms, new weakness, saddle anesthesia, bowel or bladder dysfunction, or falls. 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 No pertinent family history. Social History     Socioeconomic History    Marital status: Single     Spouse name: Not on file    Number of children: Not on file    Years of education: Not on file    Highest education level: Not on file   Occupational History    Not on file   Tobacco Use    Smoking status: Former Smoker     Packs/day: 0.50     Years: 40.00     Pack years: 20.00     Quit date: 3/19/2020     Years since quittin.0    Smokeless tobacco: Never Used   Substance and Sexual Activity    Alcohol use: Never    Drug use: Never    Sexual activity: Not on file   Other Topics Concern    Not on file   Social History Narrative    Not on file     Social Determinants of Health     Financial Resource Strain:     Difficulty of Paying Living Expenses: Not on file   Food Insecurity:     Worried About [address] in the Last Year: Not on file    [address] in the Last Year: Not on file   Transportation Needs:     Lack of Transportation (Medical): Not on file    Lack of Transportation (Non-Medical):  Not on file   Physical Activity:     Days of Exercise per Week: Not on file    Minutes of Exercise per Session: Not on file   Stress:     Feeling of Stress : Not on file   Social Connections:     Frequency of Communication with Friends and Family: Not on file    Frequency of Social Gatherings with Friends and Family: Not on file    Attends [employer] Services: Not on file    Active Member of Clubs or Organizations: Not on file    Attends Club or Organization Meetings: Not on file  Marital Status: Not on file   Intimate Partner Violence:     Fear of Current or Ex-Partner: Not on file    Emotionally Abused: Not on file    Physically Abused: Not on file    Sexually Abused: Not on file   Housing Stability:     Unable to Pay for Housing in the Last Year: Not on file    Number of [place] in the Last Year: Not on file    Unstable Housing in the Last Year: Not on file       Current Outpatient Medications on File Prior to Visit   Medication Sig Dispense Refill    Tofacitinib Citrate ER (XELJANZ XR) 11 MG TB24       lidocaine (LMX) 4 % cream Apply a half dollar sized amount to intact skin topically up to twice daily as needed for pain 1 Tube 1    levothyroxine (SYNTHROID) 25 MCG tablet Take by mouth      atenolol (TENORMIN) 50 MG tablet Take 50 mg by mouth daily      leflunomide (ARAVA) 20 MG tablet Take 20 mg by mouth daily      lovastatin (MEVACOR) 40 MG tablet Take 40 mg by mouth nightly       Current Facility-Administered Medications on File Prior to Visit   Medication Dose Route Frequency Provider Last Rate Last Admin    iopamidol (ISOVUE-370) 76 % injection 1 mL  1 mL Other ONCE PRN Courtney Hernandez MD           Review of Systems   Constitutional: Negative for fever. HENT: Negative for hearing loss. Respiratory: Negative for shortness of breath. Gastrointestinal: Negative for constipation, diarrhea and nausea. Genitourinary: Negative for difficulty urinating. Musculoskeletal: Positive for back pain. Negative for neck pain. Skin: Negative for rash. Neurological: Negative for headaches. Hematological: Does not bruise/bleed easily. Psychiatric/Behavioral: Negative for sleep disturbance.          Objective:     Vitals:  Temp 97.5 °F (36.4 °C) (Temporal)   Ht 5' 5\" (1.651 m) Comment: per pt  Wt 178 lb (80.7 kg) Comment: per pt  BMI 29.62 kg/m² Pain Score:   0 - No pain      Exam performed under coronavirus precautions  General: No acute distress  Eyes: No scleral icterus appreciated bilaterally  ENMT: Moist mucous membranes  Neck: Symmetric without any overt masses or lesions  Respiratory: Respirations are non-labored  Skin: Visualized skin is intact  Psych: Pleasant and cooperative with history and exam.  Neurologic: Gait antalgic, no assistive devices for ambulation. Pt is alert and appropriately interactive. No signs of excessive sedation. Responds promptly and appropriately to questions asked. No aberrant behaviors appreciated. +Bilateral AFOs, unable to flex extend ankles bilaterally     Left L3 and L4 paresthesias improved  Otherwise sensation grossly intact in both legs   Reflexes and strength functional for ambulation, no abnormal reflexes appreciated on exam today  Strength greater than 3/5 bilateral legs  Straight leg raise negative    There is tenderness to palpation over the lower lumbar spinous processes from L2 down to sacrum with bilateral paraspinal muscle tenderness. Rotation and extension reproduces axial low back pain. Other facet provocative maneuvers are positive. Outside record review:  EMG BLE 8/5/2021: Abnormal, left S1 lumbosacral motor radiculopathy, left L4-L5 lumbosacral motor radiculopathy, left peroneal motor neuropathy. XR LS spine 8/2/2021: L3-L5 fusion with laminectomy, severe to space loss above the level of fusion, no fracture, proximal arthritis  XR L hip 8/2/2021: Mild bilateral hip joint space reduction, no fracture. Bilateral linear calcifications likely both hip joint capsules. Remodeling left superior and inferior pubic rami nonaggressive nonacute. MRI lumbar spine 12/29/2010: Contrast study. Degenerative disease and facet arthropathy. Postoperative changes L4 laminectomy, L3-4 screw and ratie fusion on the left, L4-5 screw and artie fixation on the right. Retrolisthesis. Severe partial tear changes L4. No fracture. Prominent biliary ductal system and pancreatic duct. L1-L2 normal canal foramen.   L2-L3 mild canal stenosis, mild bilateral foraminal stenosis. L3-L4 severe canal stenosis, severe left and moderate right foraminal stenosis. L4-L5 severe canal stenosis, severe left and mild right foraminal stenosis. L5-S1 normal canal, mild bilateral foraminal stenosis. XR LS spine 11/29/2010: Rods and screws fixing L3-L5. Degenerative disc disease and facet arthropathy. Anterior subluxation L4. No fracture. Multiple air-fluid levels small bowel:, Clinical correlation  XR bilateral hip 11/29/2010: Hip and sacroiliac joints unremarkable. No fracture. Opioid risk tool score 4, moderate risk. Assessment:      Diagnosis Orders   1. Lumbosacral spondylosis without myelopathy  pregabalin (LYRICA) 50 MG capsule   2. Left leg pain  EMG     -Lumbar spine surgeries x3 with Dr Aldair Garcia with 2 fusions, in 2006 and 2011  +Rheumatoid Arthritis   Plan:     Periodic Controlled Substance Monitoring: Assessed functional status. Harsh Lester MD)    Orders Placed This Encounter   Medications    pregabalin (LYRICA) 50 MG capsule     Sig: Take 1 capsule by mouth 2 times daily for 30 days. Dispense:  60 capsule     Refill:  0       Orders Placed This Encounter   Procedures    EMG     Standing Status:   Future     Standing Expiration Date:   7/3/2022     Order Specific Question:   Which body part? Answer:   bilateral lower extremities       -Continue home exercise program   -Encourage Hepatology f/u for biliary ductal system and pancreatic duct dilation. F/u ENT for elevated calcium levels. -Reviewed XR L Hip and XR LS Spine and EMG B LE above, all questions answered  -Given concern for peripheral nerve entrapment on EMG, recommend  -EMG B LE eval left leg pain EXTERNAL STUDY  -She is doing very well after lumbar epidural. Because she is functioning so well, will hold on MRI LS Spine at this time.  History of L3-5 fusion  -Continue Lidocaine 4% ointment topical BID prn no ref  -Continue Lodine Etodolac from  [facility]  -Will continue to decrease and down titration Lyrica  -Reduce Lyrica 100 mg BID down to 50 mg BID #60 no ref start 4/4-5/4/22. [place] reviewed on 4/4/2022   -Will hold on procedures at this time  -Sparing use of back brace    Controlled Substance Monitoring:    Acute and Chronic Pain Monitoring:   RX Monitoring 4/4/2022   Periodic Controlled Substance Monitoring Assessed functional status. Discussed the risks, side effects, and symptoms that would warrant urgent or emergent physician evaluation of all medications prescribed today. The patient was advised that NSAID-type medications have three important potential side effects: gastrointestinal irritation including hemorrhage, myocardial injury including possible infarction, and kidney injury. She was asked to take the medication with food, avoid any other NSAIDs or steroids, and discontinue if she develops any concerning symptoms. She should immediately stop the medication and proceed to the emergency department for chest pain, dark urine or difficulty with producing urine, vomiting, abdominal pain or black/tarry stools. The patient expresses understanding of these issues and all questions were answered. Discussed the risks of the above recommended procedures including but not limited to bleeding, infection, worsened pain, damage to surrounding structures, side effects, toxicity, allergic reactions to medications used, immune and stress-response dysfunction, fat necrosis, skin pigmentation changes, blood sugar elevation, headache, vision changes, need for surgery, as well as catastrophic injury such as vision loss, paralysis, stroke, spinal cord and/or plexus infarction or injury, intrathecal injection, spinal cord puncture, arachnoiditis, discitis, bowel or bladder incontinence, ventilator dependence, loss of use of the arms and/or legs, and death.  Discussed off-label use of corticosteroids and how the Food and Drug Administration (FDA) has not approved corticosteroids for epidural use. Discussed the risks, benefits, alternative procedures, and alternatives to the procedure including no procedure at all. Discussed that we cannot undo any permanent neurologic damage or change the course of any underlying disease. After thorough discussion, patient expressed understanding and willingness to proceed. Provided education and counseling regarding the diagnosis, prognosis, and treatment options. All questions were answered. Encouraged her to follow-up with her primary care physician and/or specialists as required for her overall health and management of her comorbidities as well as any new positive symptoms mentioned in review of systems above. Care was provided within the definitions and limitations of our specialty practice. Encouraged lifestyle interventions including healthy habits, lifestyle changes, regular aerobic exercise and appropriate weight maintenance as advised by their primary care physician or cardiovascular health provider. Discussed well care and disease prevention/maintenance. All recommendations for therapy are provided to improve function with activities of daily living, decrease pain, and help develop an exercise program. All recommendations for medications are meant to help decrease pain, improve function with activities of daily living, maintain compliance with home exercise program, and improve quality of life. All recommendations for therapeutic injections are meant to help decrease pain, improve function with activities of daily living, maintain compliance with home exercise program, improve quality of life, and decrease reliance upon oral medications. Encouraged compliance with her home exercise program. Recommended compliance with physical therapy program as outlined above. Informed her to wear bracing as appropriate, and that bracing is not a replacement for core strengthening or muscle stabilization.     Discussed the elevated risks of excessive sedation while on pain medications. Advised her against driving or operating heavy machinery or performing any activities where she may harm herself or others while on pain medications. Particular caution was emphasized especially during dose adjustments and medication changes. Discussed the elevated risks of respiratory depression and death while on opioid medications, especially when combined with other sedative substances. Discussed the risks of temporary disability, permanent disability, morbidity, and mortality with poorly-managed or undiagnosed medical conditions and comorbidities. Emphasized the importance of timely medical evaluation and treatment as previously recommended by us or other medical professionals. Risks of not pursing these recommendations were emphasized. Advised her that any lab testing, imaging, or other diagnostic test results are best discussed in person in the office so that we can provide a clear explanation of their significance and best treatment based upon these results. It is her responsibility to make and keep a follow up appointment to discuss these test results in person to discuss the significance of the findings and appropriate follow-up steps. She expressed complete understanding and agreement with the entire plan as outlined above. Portions of this note may have been typed, auto-populated, dictated or transcribed by voice recognition resulting in errors, omissions, or close substitutions which may be missed despite careful proofreading. Please contact the author for any questions or concerns.     Follow up:  Return in about 2 months (around 6/4/2022) for reassessment of pain and symptoms, EMG External.    Artur Buckner MD

## 2022-04-04 NOTE — TELEPHONE ENCOUNTER
PT WAS SEEN TODAY AND IS QUESTIONING WHY SHE NEEDS ANOTHER EMG AND WHO SHE SHOULD GO TO?  WILL DR. BANKS ADVISE?

## 2022-04-04 NOTE — TELEPHONE ENCOUNTER
COMPUTERS WERE DOWN AND RX PRINTED AFTER PT HAD LEFT. OK PER DR. BANKS TO CALL RX INTO PHARMACY - LYRCIA 50 MG 1 PO BID, QTY: 60 WAS CALLED INTO [facility]. PT WAS CALLED TO MAKE AWARE.

## 2022-04-05 NOTE — TELEPHONE ENCOUNTER
PT states she does not want to go to [facility]. She will wait until June when she sees  again and see if she still needs the EMG then.

## 2022-07-19 ENCOUNTER — HOSPITAL ENCOUNTER (OUTPATIENT)
Dept: LAB | Age: 62
Discharge: HOME | End: 2022-07-19
Payer: MEDICARE

## 2022-07-19 LAB
ALBUMIN SERPL-MCNC: 4.4 G/DL (ref 3.5–4.6)
ALP BLD-CCNC: 99 U/L (ref 40–130)
ALT SERPL-CCNC: 23 U/L (ref 0–33)
ANION GAP SERPL CALCULATED.3IONS-SCNC: 14 MEQ/L (ref 9–15)
AST SERPL-CCNC: 31 U/L (ref 0–35)
BILIRUB SERPL-MCNC: 0.6 MG/DL (ref 0.2–0.7)
BUN BLDV-MCNC: 23 MG/DL (ref 8–23)
CALCIUM SERPL-MCNC: 11.2 MG/DL (ref 8.5–9.9)
CHLORIDE BLD-SCNC: 100 MEQ/L (ref 95–107)
CHOLESTEROL, TOTAL: 224 MG/DL (ref 0–199)
CO2: 24 MEQ/L (ref 20–31)
CREAT SERPL-MCNC: 0.94 MG/DL (ref 0.5–0.9)
GFR AFRICAN AMERICAN: >60
GFR NON-AFRICAN AMERICAN: >60
GLOBULIN: 2.8 G/DL (ref 2.3–3.5)
GLUCOSE BLD-MCNC: 100 MG/DL (ref 70–99)
HCT VFR BLD CALC: 37.9 % (ref 37–47)
HDLC SERPL-MCNC: 75 MG/DL (ref 40–59)
HEMOGLOBIN: 12.3 G/DL (ref 12–16)
LDL CHOLESTEROL CALCULATED: 118 MG/DL (ref 0–129)
MCH RBC QN AUTO: 31.3 PG (ref 27–31.3)
MCHC RBC AUTO-ENTMCNC: 32.4 % (ref 33–37)
MCV RBC AUTO: 96.8 FL (ref 82–100)
PDW BLD-RTO: 15.3 % (ref 11.5–14.5)
PLATELET # BLD: 285 K/UL (ref 130–400)
POTASSIUM SERPL-SCNC: 4.7 MEQ/L (ref 3.4–4.9)
RBC # BLD: 3.91 M/UL (ref 4.2–5.4)
SODIUM BLD-SCNC: 138 MEQ/L (ref 135–144)
TOTAL PROTEIN: 7.2 G/DL (ref 6.3–8)
TRIGL SERPL-MCNC: 154 MG/DL (ref 0–150)
TSH SERPL DL<=0.05 MIU/L-ACNC: 0.99 UIU/ML (ref 0.44–3.86)
WBC # BLD: 8.2 K/UL (ref 4.8–10.8)

## 2022-07-19 PROCEDURE — 80053 COMPREHEN METABOLIC PANEL: CPT

## 2022-07-19 PROCEDURE — 84443 ASSAY THYROID STIM HORMONE: CPT

## 2022-07-19 PROCEDURE — 80061 LIPID PANEL: CPT

## 2022-07-19 PROCEDURE — 85027 COMPLETE CBC AUTOMATED: CPT

## 2023-08-29 ENCOUNTER — HOSPITAL ENCOUNTER (OUTPATIENT)
Dept: GENERAL RADIOLOGY | Age: 63
Discharge: HOME | End: 2023-08-31
Payer: MEDICARE

## 2023-08-29 DIAGNOSIS — S92.302A CLOSED FRACTURE OF METATARSAL BONE OF LEFT FOOT, PHYSEAL INVOLVEMENT UNSPECIFIED, UNSPECIFIED METATARSAL, INITIAL ENCOUNTER: ICD-10-CM

## 2023-08-29 DIAGNOSIS — S92.301A CLOSED FRACTURE OF METATARSAL BONE OF RIGHT FOOT, PHYSEAL INVOLVEMENT UNSPECIFIED, UNSPECIFIED METATARSAL, INITIAL ENCOUNTER: ICD-10-CM

## 2023-08-29 PROCEDURE — 73630 X-RAY EXAM OF FOOT: CPT

## 2023-10-21 PROBLEM — M25.562 LEFT KNEE PAIN: Status: ACTIVE | Noted: 2023-10-21

## 2023-10-21 PROBLEM — S92.353A FRACTURE OF BASE OF FIFTH METATARSAL BONE: Status: ACTIVE | Noted: 2023-10-21

## 2023-10-21 PROBLEM — E21.3 HYPERPARATHYROIDISM (MULTI): Status: ACTIVE | Noted: 2023-10-21

## 2023-10-24 ENCOUNTER — ANCILLARY PROCEDURE (OUTPATIENT)
Dept: RADIOLOGY | Facility: CLINIC | Age: 63
End: 2023-10-24
Payer: MEDICARE

## 2023-10-24 ENCOUNTER — OFFICE VISIT (OUTPATIENT)
Dept: ORTHOPEDIC SURGERY | Facility: CLINIC | Age: 63
End: 2023-10-24
Payer: MEDICARE

## 2023-10-24 DIAGNOSIS — S92.353D: ICD-10-CM

## 2023-10-24 PROCEDURE — 1036F TOBACCO NON-USER: CPT | Performed by: INTERNAL MEDICINE

## 2023-10-24 PROCEDURE — 99024 POSTOP FOLLOW-UP VISIT: CPT | Performed by: INTERNAL MEDICINE

## 2023-10-24 NOTE — PROGRESS NOTES
Acute Injury New Patient Visit    CC:   Chief Complaint   Patient presents with    Left Foot - Follow-up     Lt metatarsal fracture  Xrays today    Right Foot - Follow-up     Rt metatarsal fracture  Xrays today       HPI: Halie is a 62 y.o. female presents today for follow-up for nondisplaced base of the fifth metatarsal fracture of the right foot and proximal phalanx fracture of the left great toe and second toe.  She denies any pain today.  She is wearing her AFO orthotics for her dropfoot.  No new issues today.        Review of Systems   GENERAL: Negative for malaise, significant weight loss, fever  MUSCULOSKELETAL: See HPI  NEURO:  Negative for numbness / tingling     Past Medical History  Past Medical History:   Diagnosis Date    Personal history of other diseases of the circulatory system     History of hypertension    Personal history of other diseases of the musculoskeletal system and connective tissue     History of arthritis    Personal history of other specified conditions     History of balance disorder       Medication review  Medication Documentation Review Audit       Reviewed by Melissa Brunner, MA (Medical Assistant) on 10/24/23 at 1022      Medication Order Taking? Sig Documenting Provider Last Dose Status            No Medications to Display                                   Allergies  Allergies   Allergen Reactions    Benazepril Unknown       Social History  Social History     Socioeconomic History    Marital status: Single     Spouse name: Not on file    Number of children: Not on file    Years of education: Not on file    Highest education level: Not on file   Occupational History    Not on file   Tobacco Use    Smoking status: Never    Smokeless tobacco: Never   Substance and Sexual Activity    Alcohol use: Not on file    Drug use: Not on file    Sexual activity: Not on file   Other Topics Concern    Not on file   Social History Narrative    Not on file     Social Determinants of Health      Financial Resource Strain: Not on file   Food Insecurity: Not on file   Transportation Needs: Not on file   Physical Activity: Not on file   Stress: Not on file   Social Connections: Not on file   Intimate Partner Violence: Not on file   Housing Stability: Not on file       Surgical History  Past Surgical History:   Procedure Laterality Date    OTHER SURGICAL HISTORY  11/11/2022    Back surgery       Physical Exam:  GENERAL:  Patient is awake, alert, and oriented to person place and time.  Patient appears well nourished and well kept.  Affect Calm, Not Acutely Distressed.  HEENT:  Normocephalic, Atraumatic, EOMI  CARDIOVASCULAR:  Hemodynamically stable.  RESPIRATORY:  Normal respirations with unlabored breathing.  Extremity: Right foot and ankle shows skin is intact.  There is no pain to lateral or medial malleolus.  There is no pain over the base of the fifth metatarsal bone.  No pain of the midfoot.    Left foot ankle shows skin is intact.  No obvious deformity or swelling.  There is no pain of the proximal phalanx of the left great toe or second toe.  There is no pain over the metatarsal bone.  Similar-appearing dropfoot.      Diagnostics: None today      Procedure: None    Assessment: 1.  Nondisplaced base of the fifth metatarsal fracture of the right foot  2.  Nondisplaced proximal phalanx fracture of the left great toe and second toe    Plan: Halie Meyer here for follow-up for a nondisplaced base of the fifth metatarsal fracture of the right foot and nondisplaced fracture of the proximal phalanx of the left great toe and second toe.  She is almost 7 weeks out and has no pain.  She does not want to get any x-rays today.  She is wearing her regular AFO orthotics.  At this point since she is clinically doing well, we will have her follow-up as needed.  If he does have increased pain, she will return the office for further imaging.    No orders of the defined types were placed in this encounter.     At  the conclusion of the visit there were no further questions by the patient/family regarding their plan of care.  Patient was instructed to call or return with any issues, questions, or concerns regarding their injury and/or treatment plan described above.     10/24/23 at 11:42 AM - Jus Jaramillo MD    Office: [phone]    This note was prepared using voice recognition software.  The details of this note are correct and have been reviewed, and corrected to the best of my ability.  Some grammatical errors may persist related to the Dragon software.

## 2024-11-12 ENCOUNTER — APPOINTMENT (OUTPATIENT)
Dept: ORTHOPEDIC SURGERY | Facility: CLINIC | Age: 64
End: 2024-11-12
Payer: MEDICARE

## 2024-11-12 ENCOUNTER — HOSPITAL ENCOUNTER (OUTPATIENT)
Dept: RADIOLOGY | Facility: HOSPITAL | Age: 64
Discharge: HOME | End: 2024-11-12
Payer: MEDICARE

## 2024-11-12 DIAGNOSIS — M79.672 LEFT FOOT PAIN: ICD-10-CM

## 2024-11-12 DIAGNOSIS — S92.353D: ICD-10-CM

## 2024-11-12 DIAGNOSIS — M25.572 PAIN AND SWELLING OF LEFT ANKLE: ICD-10-CM

## 2024-11-12 DIAGNOSIS — M25.472 PAIN AND SWELLING OF LEFT ANKLE: ICD-10-CM

## 2024-11-12 PROCEDURE — 1036F TOBACCO NON-USER: CPT | Performed by: INTERNAL MEDICINE

## 2024-11-12 PROCEDURE — 1159F MED LIST DOCD IN RCRD: CPT | Performed by: INTERNAL MEDICINE

## 2024-11-12 PROCEDURE — 73630 X-RAY EXAM OF FOOT: CPT | Mod: LT

## 2024-11-12 PROCEDURE — 73610 X-RAY EXAM OF ANKLE: CPT | Mod: LEFT SIDE | Performed by: RADIOLOGY

## 2024-11-12 PROCEDURE — 73610 X-RAY EXAM OF ANKLE: CPT | Mod: LT

## 2024-11-12 PROCEDURE — 99214 OFFICE O/P EST MOD 30 MIN: CPT | Performed by: INTERNAL MEDICINE

## 2024-11-12 PROCEDURE — 73630 X-RAY EXAM OF FOOT: CPT | Mod: LEFT SIDE | Performed by: RADIOLOGY

## 2024-11-12 PROCEDURE — 28470 CLTX METATARSAL FX WO MNP EA: CPT | Performed by: INTERNAL MEDICINE

## 2024-11-12 RX ORDER — OXYCODONE AND ACETAMINOPHEN 5; 325 MG/1; MG/1
1 TABLET ORAL EVERY 12 HOURS PRN
Qty: 14 TABLET | Refills: 0 | Status: SHIPPED | OUTPATIENT
Start: 2024-11-12 | End: 2024-11-19

## 2024-11-12 NOTE — PROGRESS NOTES
CC:   Chief Complaint   Patient presents with    Left Ankle - Pain     Fell x1 month ago  Xrays today     Left Foot - Pain     Fell x1 month ago  Xrays today        HPI: Halie is a 63 y.o. female presents today for evaluation for s left foot injury sustained about a month ago. She states that she fell. She is here for initial evaluation and x-rays.  She wears bilateral AFO for foot drop.        Review of Systems   GENERAL: Negative for malaise, significant weight loss, fever  MUSCULOSKELETAL: See HPI  NEURO:  Negative for numbness / tingling     Past Medical History  Past Medical History:   Diagnosis Date    Personal history of other diseases of the circulatory system     History of hypertension    Personal history of other diseases of the musculoskeletal system and connective tissue     History of arthritis    Personal history of other specified conditions     History of balance disorder       Medication review  Medication Documentation Review Audit       Reviewed by Qing Kelly MA (Medical Assistant) on 11/12/24 at 0924      Medication Order Taking? Sig Documenting Provider Last Dose Status            No Medications to Display                                   Allergies  Allergies   Allergen Reactions    Benazepril Unknown       Social History  Social History     Socioeconomic History    Marital status: Single     Spouse name: Not on file    Number of children: Not on file    Years of education: Not on file    Highest education level: Not on file   Occupational History    Not on file   Tobacco Use    Smoking status: Never    Smokeless tobacco: Never   Substance and Sexual Activity    Alcohol use: Not on file    Drug use: Not on file    Sexual activity: Not on file   Other Topics Concern    Not on file   Social History Narrative    Not on file     Social Drivers of Health     Financial Resource Strain: Not on file   Food Insecurity: Not on file   Transportation Needs: Not on file   Physical Activity: Not on  file   Stress: Not on file   Social Connections: Not on file   Intimate Partner Violence: Not on file   Housing Stability: Not on file       Surgical History  Past Surgical History:   Procedure Laterality Date    OTHER SURGICAL HISTORY  11/11/2022    Back surgery       Physical Exam:  GENERAL:  Patient is awake, alert, and oriented to person place and time.  Patient appears well nourished and well kept.  Affect Calm, Not Acutely Distressed.  HEENT:  Normocephalic, Atraumatic, EOMI  CARDIOVASCULAR:  Hemodynamically stable.  RESPIRATORY:  Normal respirations with unlabored breathing.  Extremity: Left foot examination shows skin is intact.  There is no erythema or warmth.  Mild swelling localized on the lateral aspect.  No obvious deformity.  There is no clinical signs of infection.  There is pain over the base of the fifth metatarsal bone.  There is no pain in the midfoot.  No pain over the metatarsal bones.  No pain of the cuboid bone.  There is no pain in the calcaneus.  There is no pain over the plantar aponeurosis.  There is no pain over the proximal phalanx of the right great toe.  No pain over distal phalanx of the right great toe.  Clinical findings of left-sided foot drop.    Left ankle shows skin is intact.  There is no erythema or warmth.  There is no clinical signs of infection.  There is no pain over the lateral malleolus.  There is no pain of the medial malleolus.  There is no pain over the ATF, CF or PTF ligament.  There is no pain over the deltoid ligament.  No pain over the Achilles tendon.  Negative Sullivan's test.  Negative squeeze test.  Negative anterior drawer test.  Negative talar tilt test.  No pain over the anterior process of the talus.  There is no pain over the talar dome.  There is pain at the base of the fifth metatarsal bone.  No pain of the calcaneus.  No pain over the plantar aponeurosis.        Diagnostics: X-rays reviewed        Procedure: None    Assessment: Acute nondisplaced base  of the fifth metatarsal fracture of the left foot    Plan: Halie presents today for evaluation for subacute left ankle and left foot injury sustained about a month ago after a fall.  We recommended nonsurgical treatment, she will continue with her AFO orthotics, as there is a carbon fiber plate within her AFO which will adequately protect her base of the fifth metatarsal fracture, as she has a difficult time with the fracture boot.  She may weight-bear as tolerated.  We did refill her pain medication.  She will follow-up in 5 to 6 weeks and repeat x-rays of the left foot 3 views AP, lateral and oblique views.    Orders Placed This Encounter    XR ankle left 3+ views    XR foot left 3+ views      At the conclusion of the visit there were no further questions by the patient/family regarding their plan of care.  Patient was instructed to call or return with any issues, questions, or concerns regarding their injury and/or treatment plan described above.     11/12/24 at 9:48 AM - Jus Jaramillo MD  Scribe Attestation  By signing my name below, I, Alex Twan, Scribbenjamin   attest that this documentation has been prepared under the direction and in the presence of Jus Jaramillo MD.    Office: [phone]    This note was prepared using voice recognition software.  The details of this note are correct and have been reviewed, and corrected to the best of my ability.  Some grammatical errors may persist related to the Dragon software.

## 2024-12-18 ENCOUNTER — APPOINTMENT (OUTPATIENT)
Dept: RADIOLOGY | Facility: HOSPITAL | Age: 64
DRG: 481 | End: 2024-12-18
Payer: MEDICARE

## 2024-12-18 ENCOUNTER — HOSPITAL ENCOUNTER (INPATIENT)
Facility: HOSPITAL | Age: 64
DRG: 481 | End: 2024-12-18
Attending: EMERGENCY MEDICINE | Admitting: NURSE PRACTITIONER
Payer: MEDICARE

## 2024-12-18 ENCOUNTER — APPOINTMENT (OUTPATIENT)
Dept: CARDIOLOGY | Facility: HOSPITAL | Age: 64
DRG: 481 | End: 2024-12-18
Payer: MEDICARE

## 2024-12-18 DIAGNOSIS — S72.322A CLOSED DISPLACED TRANSVERSE FRACTURE OF SHAFT OF LEFT FEMUR, INITIAL ENCOUNTER: Primary | ICD-10-CM

## 2024-12-18 LAB
ALBUMIN SERPL BCP-MCNC: 3.5 G/DL (ref 3.4–5)
ALP SERPL-CCNC: 90 U/L (ref 33–136)
ALT SERPL W P-5'-P-CCNC: 23 U/L (ref 7–45)
ANION GAP SERPL CALC-SCNC: 13 MMOL/L (ref 10–20)
APTT PPP: 35 SECONDS (ref 27–38)
AST SERPL W P-5'-P-CCNC: 25 U/L (ref 9–39)
BASOPHILS # BLD AUTO: 0.06 X10*3/UL (ref 0–0.1)
BASOPHILS NFR BLD AUTO: 0.5 %
BILIRUB SERPL-MCNC: 0.5 MG/DL (ref 0–1.2)
BUN SERPL-MCNC: 14 MG/DL (ref 6–23)
CALCIUM SERPL-MCNC: 10.7 MG/DL (ref 8.6–10.3)
CHLORIDE SERPL-SCNC: 103 MMOL/L (ref 98–107)
CO2 SERPL-SCNC: 25 MMOL/L (ref 21–32)
CREAT SERPL-MCNC: 0.93 MG/DL (ref 0.5–1.05)
EGFRCR SERPLBLD CKD-EPI 2021: 68 ML/MIN/1.73M*2
EOSINOPHIL # BLD AUTO: 0.1 X10*3/UL (ref 0–0.7)
EOSINOPHIL NFR BLD AUTO: 0.8 %
ERYTHROCYTE [DISTWIDTH] IN BLOOD BY AUTOMATED COUNT: 15.7 % (ref 11.5–14.5)
GLUCOSE SERPL-MCNC: 107 MG/DL (ref 74–99)
HCT VFR BLD AUTO: 31.9 % (ref 36–46)
HGB BLD-MCNC: 10.6 G/DL (ref 12–16)
IMM GRANULOCYTES # BLD AUTO: 0.09 X10*3/UL (ref 0–0.7)
IMM GRANULOCYTES NFR BLD AUTO: 0.7 % (ref 0–0.9)
INR PPP: 1.1 (ref 0.9–1.1)
LYMPHOCYTES # BLD AUTO: 2 X10*3/UL (ref 1.2–4.8)
LYMPHOCYTES NFR BLD AUTO: 15.7 %
MCH RBC QN AUTO: 31.1 PG (ref 26–34)
MCHC RBC AUTO-ENTMCNC: 33.2 G/DL (ref 32–36)
MCV RBC AUTO: 94 FL (ref 80–100)
MONOCYTES # BLD AUTO: 0.99 X10*3/UL (ref 0.1–1)
MONOCYTES NFR BLD AUTO: 7.8 %
NEUTROPHILS # BLD AUTO: 9.51 X10*3/UL (ref 1.2–7.7)
NEUTROPHILS NFR BLD AUTO: 74.5 %
NRBC BLD-RTO: 0 /100 WBCS (ref 0–0)
PLATELET # BLD AUTO: 295 X10*3/UL (ref 150–450)
POTASSIUM SERPL-SCNC: 4 MMOL/L (ref 3.5–5.3)
PROT SERPL-MCNC: 6.2 G/DL (ref 6.4–8.2)
PROTHROMBIN TIME: 12.6 SECONDS (ref 9.8–12.8)
RBC # BLD AUTO: 3.41 X10*6/UL (ref 4–5.2)
SODIUM SERPL-SCNC: 137 MMOL/L (ref 136–145)
WBC # BLD AUTO: 12.8 X10*3/UL (ref 4.4–11.3)

## 2024-12-18 PROCEDURE — 99285 EMERGENCY DEPT VISIT HI MDM: CPT | Mod: 25 | Performed by: EMERGENCY MEDICINE

## 2024-12-18 PROCEDURE — 85025 COMPLETE CBC W/AUTO DIFF WBC: CPT | Performed by: PHYSICIAN ASSISTANT

## 2024-12-18 PROCEDURE — 36415 COLL VENOUS BLD VENIPUNCTURE: CPT | Performed by: PHYSICIAN ASSISTANT

## 2024-12-18 PROCEDURE — 2500000004 HC RX 250 GENERAL PHARMACY W/ HCPCS (ALT 636 FOR OP/ED): Performed by: PHYSICIAN ASSISTANT

## 2024-12-18 PROCEDURE — 71045 X-RAY EXAM CHEST 1 VIEW: CPT

## 2024-12-18 PROCEDURE — 96374 THER/PROPH/DIAG INJ IV PUSH: CPT | Mod: 59

## 2024-12-18 PROCEDURE — 72170 X-RAY EXAM OF PELVIS: CPT

## 2024-12-18 PROCEDURE — 72170 X-RAY EXAM OF PELVIS: CPT | Performed by: STUDENT IN AN ORGANIZED HEALTH CARE EDUCATION/TRAINING PROGRAM

## 2024-12-18 PROCEDURE — 85730 THROMBOPLASTIN TIME PARTIAL: CPT | Performed by: PHYSICIAN ASSISTANT

## 2024-12-18 PROCEDURE — 73552 X-RAY EXAM OF FEMUR 2/>: CPT | Mod: LT

## 2024-12-18 PROCEDURE — 71045 X-RAY EXAM CHEST 1 VIEW: CPT | Performed by: STUDENT IN AN ORGANIZED HEALTH CARE EDUCATION/TRAINING PROGRAM

## 2024-12-18 PROCEDURE — 80053 COMPREHEN METABOLIC PANEL: CPT | Performed by: PHYSICIAN ASSISTANT

## 2024-12-18 PROCEDURE — 85610 PROTHROMBIN TIME: CPT | Performed by: PHYSICIAN ASSISTANT

## 2024-12-18 PROCEDURE — 73552 X-RAY EXAM OF FEMUR 2/>: CPT | Performed by: STUDENT IN AN ORGANIZED HEALTH CARE EDUCATION/TRAINING PROGRAM

## 2024-12-18 PROCEDURE — 93005 ELECTROCARDIOGRAM TRACING: CPT

## 2024-12-18 PROCEDURE — 99223 1ST HOSP IP/OBS HIGH 75: CPT | Performed by: NURSE PRACTITIONER

## 2024-12-18 PROCEDURE — 2500000004 HC RX 250 GENERAL PHARMACY W/ HCPCS (ALT 636 FOR OP/ED): Performed by: NURSE PRACTITIONER

## 2024-12-18 PROCEDURE — 96375 TX/PRO/DX INJ NEW DRUG ADDON: CPT | Mod: 59

## 2024-12-18 PROCEDURE — 1210000001 HC SEMI-PRIVATE ROOM DAILY

## 2024-12-18 PROCEDURE — 29505 APPLICATION LONG LEG SPLINT: CPT | Performed by: EMERGENCY MEDICINE

## 2024-12-18 RX ORDER — ACETAMINOPHEN 500 MG
10 TABLET ORAL NIGHTLY PRN
Status: DISCONTINUED | OUTPATIENT
Start: 2024-12-18 | End: 2024-12-24 | Stop reason: HOSPADM

## 2024-12-18 RX ORDER — ACETAMINOPHEN 650 MG/1
650 SUPPOSITORY RECTAL EVERY 4 HOURS PRN
Status: DISCONTINUED | OUTPATIENT
Start: 2024-12-18 | End: 2024-12-24 | Stop reason: HOSPADM

## 2024-12-18 RX ORDER — ACETAMINOPHEN 160 MG/5ML
650 SOLUTION ORAL EVERY 4 HOURS PRN
Status: DISCONTINUED | OUTPATIENT
Start: 2024-12-18 | End: 2024-12-24 | Stop reason: HOSPADM

## 2024-12-18 RX ORDER — HYDROMORPHONE HYDROCHLORIDE 0.2 MG/ML
0.2 INJECTION INTRAMUSCULAR; INTRAVENOUS; SUBCUTANEOUS
Status: DISCONTINUED | OUTPATIENT
Start: 2024-12-18 | End: 2024-12-24 | Stop reason: HOSPADM

## 2024-12-18 RX ORDER — HEPARIN SODIUM 5000 [USP'U]/ML
5000 INJECTION, SOLUTION INTRAVENOUS; SUBCUTANEOUS EVERY 8 HOURS
Status: DISCONTINUED | OUTPATIENT
Start: 2024-12-18 | End: 2024-12-20

## 2024-12-18 RX ORDER — HYDROMORPHONE HYDROCHLORIDE 1 MG/ML
1 INJECTION, SOLUTION INTRAMUSCULAR; INTRAVENOUS; SUBCUTANEOUS ONCE
Status: COMPLETED | OUTPATIENT
Start: 2024-12-18 | End: 2024-12-18

## 2024-12-18 RX ORDER — POLYETHYLENE GLYCOL 3350 17 G/17G
17 POWDER, FOR SOLUTION ORAL DAILY PRN
Status: DISCONTINUED | OUTPATIENT
Start: 2024-12-18 | End: 2024-12-24 | Stop reason: HOSPADM

## 2024-12-18 RX ORDER — HYDROMORPHONE HYDROCHLORIDE 1 MG/ML
1 INJECTION, SOLUTION INTRAMUSCULAR; INTRAVENOUS; SUBCUTANEOUS
Status: DISCONTINUED | OUTPATIENT
Start: 2024-12-18 | End: 2024-12-19

## 2024-12-18 RX ORDER — FENTANYL CITRATE 50 UG/ML
50 INJECTION, SOLUTION INTRAMUSCULAR; INTRAVENOUS ONCE
Status: COMPLETED | OUTPATIENT
Start: 2024-12-18 | End: 2024-12-18

## 2024-12-18 RX ORDER — ACETAMINOPHEN 325 MG/1
650 TABLET ORAL EVERY 4 HOURS PRN
Status: DISCONTINUED | OUTPATIENT
Start: 2024-12-18 | End: 2024-12-24 | Stop reason: HOSPADM

## 2024-12-18 RX ADMIN — HYDROMORPHONE HYDROCHLORIDE 0.5 MG: 1 INJECTION, SOLUTION INTRAMUSCULAR; INTRAVENOUS; SUBCUTANEOUS at 19:19

## 2024-12-18 RX ADMIN — HYDROMORPHONE HYDROCHLORIDE 1 MG: 1 INJECTION, SOLUTION INTRAMUSCULAR; INTRAVENOUS; SUBCUTANEOUS at 20:04

## 2024-12-18 RX ADMIN — FENTANYL CITRATE 50 MCG: 50 INJECTION INTRAMUSCULAR; INTRAVENOUS at 17:10

## 2024-12-18 RX ADMIN — HEPARIN SODIUM 5000 UNITS: 5000 INJECTION INTRAVENOUS; SUBCUTANEOUS at 22:17

## 2024-12-18 RX ADMIN — HYDROMORPHONE HYDROCHLORIDE 1 MG: 1 INJECTION, SOLUTION INTRAMUSCULAR; INTRAVENOUS; SUBCUTANEOUS at 23:12

## 2024-12-18 RX ADMIN — HYDROMORPHONE HYDROCHLORIDE 0.2 MG: 0.2 INJECTION, SOLUTION INTRAMUSCULAR; INTRAVENOUS; SUBCUTANEOUS at 21:34

## 2024-12-18 SDOH — SOCIAL STABILITY: SOCIAL INSECURITY
WITHIN THE LAST YEAR, HAVE YOU BEEN KICKED, HIT, SLAPPED, OR OTHERWISE PHYSICALLY HURT BY YOUR PARTNER OR EX-PARTNER?: NO

## 2024-12-18 SDOH — ECONOMIC STABILITY: FOOD INSECURITY: WITHIN THE PAST 12 MONTHS, THE FOOD YOU BOUGHT JUST DIDN'T LAST AND YOU DIDN'T HAVE MONEY TO GET MORE.: NEVER TRUE

## 2024-12-18 SDOH — SOCIAL STABILITY: SOCIAL INSECURITY: DOES ANYONE TRY TO KEEP YOU FROM HAVING/CONTACTING OTHER FRIENDS OR DOING THINGS OUTSIDE YOUR HOME?: NO

## 2024-12-18 SDOH — SOCIAL STABILITY: SOCIAL INSECURITY: DO YOU FEEL UNSAFE GOING BACK TO THE PLACE WHERE YOU ARE LIVING?: NO

## 2024-12-18 SDOH — SOCIAL STABILITY: SOCIAL INSECURITY: WITHIN THE LAST YEAR, HAVE YOU BEEN HUMILIATED OR EMOTIONALLY ABUSED IN OTHER WAYS BY YOUR PARTNER OR EX-PARTNER?: NO

## 2024-12-18 SDOH — SOCIAL STABILITY: SOCIAL INSECURITY: HAVE YOU HAD THOUGHTS OF HARMING ANYONE ELSE?: NO

## 2024-12-18 SDOH — SOCIAL STABILITY: SOCIAL INSECURITY: WITHIN THE LAST YEAR, HAVE YOU BEEN AFRAID OF YOUR PARTNER OR EX-PARTNER?: NO

## 2024-12-18 SDOH — ECONOMIC STABILITY: FOOD INSECURITY: WITHIN THE PAST 12 MONTHS, YOU WORRIED THAT YOUR FOOD WOULD RUN OUT BEFORE YOU GOT THE MONEY TO BUY MORE.: NEVER TRUE

## 2024-12-18 SDOH — SOCIAL STABILITY: SOCIAL INSECURITY: DO YOU FEEL ANYONE HAS EXPLOITED OR TAKEN ADVANTAGE OF YOU FINANCIALLY OR OF YOUR PERSONAL PROPERTY?: NO

## 2024-12-18 SDOH — ECONOMIC STABILITY: INCOME INSECURITY: IN THE PAST 12 MONTHS HAS THE ELECTRIC, GAS, OIL, OR WATER COMPANY THREATENED TO SHUT OFF SERVICES IN YOUR HOME?: NO

## 2024-12-18 SDOH — SOCIAL STABILITY: SOCIAL INSECURITY: WERE YOU ABLE TO COMPLETE ALL THE BEHAVIORAL HEALTH SCREENINGS?: YES

## 2024-12-18 SDOH — SOCIAL STABILITY: SOCIAL INSECURITY
WITHIN THE LAST YEAR, HAVE YOU BEEN RAPED OR FORCED TO HAVE ANY KIND OF SEXUAL ACTIVITY BY YOUR PARTNER OR EX-PARTNER?: NO

## 2024-12-18 SDOH — SOCIAL STABILITY: SOCIAL INSECURITY: ABUSE: ADULT

## 2024-12-18 SDOH — SOCIAL STABILITY: SOCIAL INSECURITY: HAS ANYONE EVER THREATENED TO HURT YOUR FAMILY OR YOUR PETS?: NO

## 2024-12-18 SDOH — SOCIAL STABILITY: SOCIAL INSECURITY: ARE YOU OR HAVE YOU BEEN THREATENED OR ABUSED PHYSICALLY, EMOTIONALLY, OR SEXUALLY BY ANYONE?: NO

## 2024-12-18 SDOH — SOCIAL STABILITY: SOCIAL INSECURITY: ARE THERE ANY APPARENT SIGNS OF INJURIES/BEHAVIORS THAT COULD BE RELATED TO ABUSE/NEGLECT?: NO

## 2024-12-18 ASSESSMENT — LIFESTYLE VARIABLES
HOW OFTEN DO YOU HAVE 6 OR MORE DRINKS ON ONE OCCASION: NEVER
HOW MANY STANDARD DRINKS CONTAINING ALCOHOL DO YOU HAVE ON A TYPICAL DAY: PATIENT DOES NOT DRINK
EVER HAD A DRINK FIRST THING IN THE MORNING TO STEADY YOUR NERVES TO GET RID OF A HANGOVER: NO
TOTAL SCORE: 0
SKIP TO QUESTIONS 9-10: 1
HAVE PEOPLE ANNOYED YOU BY CRITICIZING YOUR DRINKING: NO
AUDIT-C TOTAL SCORE: 0
EVER FELT BAD OR GUILTY ABOUT YOUR DRINKING: NO
HOW OFTEN DO YOU HAVE A DRINK CONTAINING ALCOHOL: NEVER
AUDIT-C TOTAL SCORE: 0
HAVE YOU EVER FELT YOU SHOULD CUT DOWN ON YOUR DRINKING: NO

## 2024-12-18 ASSESSMENT — ACTIVITIES OF DAILY LIVING (ADL)
LACK_OF_TRANSPORTATION: NO
WALKS IN HOME: INDEPENDENT
JUDGMENT_ADEQUATE_SAFELY_COMPLETE_DAILY_ACTIVITIES: YES
HEARING - LEFT EAR: FUNCTIONAL
BATHING: INDEPENDENT
TOILETING: INDEPENDENT
LACK_OF_TRANSPORTATION: PATIENT DECLINED
ASSISTIVE_DEVICE: CANE;EYEGLASSES
HEARING - RIGHT EAR: FUNCTIONAL
DRESSING YOURSELF: INDEPENDENT
GROOMING: INDEPENDENT
PATIENT'S MEMORY ADEQUATE TO SAFELY COMPLETE DAILY ACTIVITIES?: YES
ADEQUATE_TO_COMPLETE_ADL: YES
FEEDING YOURSELF: INDEPENDENT

## 2024-12-18 ASSESSMENT — PATIENT HEALTH QUESTIONNAIRE - PHQ9
SUM OF ALL RESPONSES TO PHQ9 QUESTIONS 1 & 2: 2
2. FEELING DOWN, DEPRESSED OR HOPELESS: SEVERAL DAYS
1. LITTLE INTEREST OR PLEASURE IN DOING THINGS: SEVERAL DAYS

## 2024-12-18 ASSESSMENT — PAIN DESCRIPTION - ORIENTATION
ORIENTATION: LEFT

## 2024-12-18 ASSESSMENT — PAIN DESCRIPTION - LOCATION
LOCATION: HIP
LOCATION: HIP
LOCATION: LEG
LOCATION: LEG

## 2024-12-18 ASSESSMENT — PAIN SCALES - GENERAL
PAINLEVEL_OUTOF10: 7
PAINLEVEL_OUTOF10: 10 - WORST POSSIBLE PAIN
PAINLEVEL_OUTOF10: 9
PAINLEVEL_OUTOF10: 10 - WORST POSSIBLE PAIN
PAINLEVEL_OUTOF10: 8
PAINLEVEL_OUTOF10: 10 - WORST POSSIBLE PAIN

## 2024-12-18 ASSESSMENT — COGNITIVE AND FUNCTIONAL STATUS - GENERAL
DAILY ACTIVITIY SCORE: 12
MOVING FROM LYING ON BACK TO SITTING ON SIDE OF FLAT BED WITH BEDRAILS: A LOT
WALKING IN HOSPITAL ROOM: TOTAL
TURNING FROM BACK TO SIDE WHILE IN FLAT BAD: TOTAL
HELP NEEDED FOR BATHING: TOTAL
MOBILITY SCORE: 7
DRESSING REGULAR LOWER BODY CLOTHING: TOTAL
PATIENT BASELINE BEDBOUND: NO
MOVING TO AND FROM BED TO CHAIR: TOTAL
CLIMB 3 TO 5 STEPS WITH RAILING: TOTAL
TOILETING: A LOT
DRESSING REGULAR UPPER BODY CLOTHING: TOTAL
PERSONAL GROOMING: A LITTLE
STANDING UP FROM CHAIR USING ARMS: TOTAL

## 2024-12-18 ASSESSMENT — ENCOUNTER SYMPTOMS
SHORTNESS OF BREATH: 0
FREQUENCY: 0
FLANK PAIN: 0
ARTHRALGIAS: 1
FEVER: 0
NAUSEA: 0
PALPITATIONS: 0
MYALGIAS: 1
VOMITING: 0
HEMATURIA: 0
ABDOMINAL PAIN: 0
CHILLS: 0
DIARRHEA: 0
DYSURIA: 0
CONSTIPATION: 0

## 2024-12-18 ASSESSMENT — PAIN - FUNCTIONAL ASSESSMENT
PAIN_FUNCTIONAL_ASSESSMENT: 0-10

## 2024-12-18 ASSESSMENT — PAIN SCALES - PAIN ASSESSMENT IN ADVANCED DEMENTIA (PAINAD): TOTALSCORE: MEDICATION (SEE MAR)

## 2024-12-18 NOTE — ED PROVIDER NOTES
HPI   Chief Complaint   Patient presents with    Fall     Patient fell at home, and presents left hip shortening and severe pain and some swelling       64-year-old female with history of hypertension, rheumatoid arthritis presenting to the ER today with pain in her left hip and upper leg after a fall that occurred prior to arrival.  Patient tells me that she normally walks with a cane, she has had issues with her left hip due to her history of RA.  She states that she was walking to get something out of a cabinet, she felt a pain in her hip which caused her to feel unsteady and she fell directly onto her hip and left leg.  She had pain immediately, denies hitting her head or LOC and is not on a blood thinner.  Patient states that she called for her brother to come over who then called 911.  Patient tells me that she did not injure her arms or her right leg.  She did not pass out or have any syncopal episode.  She states she is not having any chest pain or abdominal pain from the fall and denies nausea or vomiting.  She did not hit her head and denies any headache or dizziness.  She does not have any neck pain.  She states that she has chronic low back pain and has had surgery on her back before but has not had any increase in her normal back pain.  No further complaints at this time.      History provided by:  Patient          Patient History   Past Medical History:   Diagnosis Date    Personal history of other diseases of the circulatory system     History of hypertension    Personal history of other diseases of the musculoskeletal system and connective tissue     History of arthritis    Personal history of other specified conditions     History of balance disorder     Past Surgical History:   Procedure Laterality Date    OTHER SURGICAL HISTORY  11/11/2022    Back surgery     No family history on file.  Social History     Tobacco Use    Smoking status: Never    Smokeless tobacco: Never   Substance Use Topics     Alcohol use: Not on file    Drug use: Not on file       Physical Exam   ED Triage Vitals [12/18/24 1629]   Temperature Heart Rate Respirations BP   36.4 °C (97.5 °F) 72 18 136/86      Pulse Ox Temp Source Heart Rate Source Patient Position   100 % Temporal Monitor Lying      BP Location FiO2 (%)     Right arm --       Physical Exam  HENT:      Head: Normocephalic and atraumatic.      Mouth/Throat:      Mouth: Mucous membranes are moist.      Pharynx: Oropharynx is clear.   Eyes:      Extraocular Movements: Extraocular movements intact.      Conjunctiva/sclera: Conjunctivae normal.      Pupils: Pupils are equal, round, and reactive to light.   Cardiovascular:      Rate and Rhythm: Normal rate and regular rhythm.      Pulses: Normal pulses.      Heart sounds: Normal heart sounds.      Comments: No peripheral edema.  Distal pulses intact and symmetric.  Cap refill less than 2 seconds.  Pulmonary:      Effort: Pulmonary effort is normal.      Breath sounds: Normal breath sounds.   Abdominal:      General: Bowel sounds are normal. There is no distension.      Palpations: Abdomen is soft.      Tenderness: There is no abdominal tenderness. There is no guarding.      Comments: No signs of trauma to the abdomen or flanks.   Musculoskeletal:      Cervical back: Normal range of motion and neck supple.      Comments: No midline tenderness, step-offs or signs of trauma to the cervical, thoracic and lumbar spines.  Chest wall nontender and atraumatic.  Tenderness to the proximal portion of the left left lower extremity with swelling/deformity.  No tenderness throughout the pelvis.  No further tenderness or signs of trauma on exam. NVI   Skin:     General: Skin is warm.      Capillary Refill: Capillary refill takes less than 2 seconds.   Neurological:      Mental Status: She is alert and oriented to person, place, and time.           ED Course & MDM   Diagnoses as of 12/18/24 8355   Closed displaced transverse fracture of shaft of  left femur, initial encounter                 No data recorded                                 Medical Decision Making  64-year-old female with history of hypertension, rheumatoid arthritis presenting to the ER today with left leg pain after a fall that occurred prior to arrival.  Patient states that she walks with a cane, she was walking and had some pain in her hip that caused her to fall.  She states her leg gave out on her.  She fell onto the left leg and has had pain since then.  She denies hitting her head or LOC.  There was no syncope or chest pain prior to the event and she states she remembers the entire thing.  Patient arrives afebrile with stable vital signs.  She is awake alert oriented, mentating appropriately and her speech is clear.  On exam she is tender in the left lower extremity over the proximal portion of the femur with swelling.  There is no tenderness throughout the pelvis and she has good distal pulses neurovascularly intact.  Range of motion is decreased secondary to pain and deformity.  Heart RRR, lungs are clear.  There is no further signs of trauma on exam and she is not endorsing any further symptoms.  Imaging studies are ordered after my evaluation I discussed case with my attending.    X-ray today demonstrates a transverse fracture of the proximal left femoral diaphysis with medial angulation and full shaft displacement of 7 cm.  No other acute finding.  I did order chest x-ray, EKG and laboratory studies and orthopedics will be consulted.    ECG per my interpretation normal sinus rhythm at 71 bpm without acute ST-T wave changes or arrhythmia.  Chest x-ray today shows no acute cardiopulmonary process.  CBC with slight leukocytosis of 12.8.  Labs are otherwise within normal limits.    I did discuss the case with Dr. Thornton who would like the patient admitted under medicine, the patient placed in a long-leg splint and they will be on consult for her.  I did discuss results today and  the need for admission with the patient and she was agreeable with this plan. I discussed the case with ZAHIDA Burns who accepts the patient for admission at this time.       Labs Reviewed   CBC WITH AUTO DIFFERENTIAL - Abnormal       Result Value    WBC 12.8 (*)     nRBC 0.0      RBC 3.41 (*)     Hemoglobin 10.6 (*)     Hematocrit 31.9 (*)     MCV 94      MCH 31.1      MCHC 33.2      RDW 15.7 (*)     Platelets 295      Neutrophils % 74.5      Immature Granulocytes %, Automated 0.7      Lymphocytes % 15.7      Monocytes % 7.8      Eosinophils % 0.8      Basophils % 0.5      Neutrophils Absolute 9.51 (*)     Immature Granulocytes Absolute, Automated 0.09      Lymphocytes Absolute 2.00      Monocytes Absolute 0.99      Eosinophils Absolute 0.10      Basophils Absolute 0.06     COMPREHENSIVE METABOLIC PANEL - Abnormal    Glucose 107 (*)     Sodium 137      Potassium 4.0      Chloride 103      Bicarbonate 25      Anion Gap 13      Urea Nitrogen 14      Creatinine 0.93      eGFR 68      Calcium 10.7 (*)     Albumin 3.5      Alkaline Phosphatase 90      Total Protein 6.2 (*)     AST 25      Bilirubin, Total 0.5      ALT 23     PROTIME-INR - Normal    Protime 12.6      INR 1.1     APTT - Normal    aPTT 35      Narrative:     The APTT is no longer used for monitoring Unfractionated Heparin Therapy. For monitoring Heparin Therapy, use the Heparin Assay.       XR femur left 2+ views   Final Result   1.  Transverse fracture from the proximal left femoral diaphysis with   medial angulation of the fracture fragments, full shaft displacement   of the distal fracture fragment and 7 cm of foreshortening.   2. No evidence of acute trauma to the pelvis.        MACRO:   None        Signed by: Brandon Kimble 12/18/2024 6:07 PM   Dictation workstation:   [id number]      XR chest 1 view   Final Result   1.  No evidence of acute cardiopulmonary process.                  MACRO:   None        Signed by: Brandon Kimble 12/18/2024  6:07 PM   Dictation workstation:   [id number]      XR pelvis 1-2 views   Final Result   1.  Transverse fracture from the proximal left femoral diaphysis with   medial angulation of the fracture fragments, full shaft displacement   of the distal fracture fragment and 7 cm of foreshortening.   2. No evidence of acute trauma to the pelvis.        MACRO:   None        Signed by: Brandon Kimble 12/18/2024 6:07 PM   Dictation workstation:   [id number]            Procedure  Procedures     Michelle Bunn PA-C  12/18/24 2021

## 2024-12-19 ENCOUNTER — ANESTHESIA EVENT (OUTPATIENT)
Dept: OPERATING ROOM | Facility: HOSPITAL | Age: 64
End: 2024-12-19
Payer: MEDICARE

## 2024-12-19 ENCOUNTER — ANESTHESIA (OUTPATIENT)
Dept: OPERATING ROOM | Facility: HOSPITAL | Age: 64
End: 2024-12-19
Payer: MEDICARE

## 2024-12-19 ENCOUNTER — APPOINTMENT (OUTPATIENT)
Dept: RADIOLOGY | Facility: HOSPITAL | Age: 64
DRG: 481 | End: 2024-12-19
Payer: MEDICARE

## 2024-12-19 ENCOUNTER — APPOINTMENT (OUTPATIENT)
Dept: ORTHOPEDIC SURGERY | Facility: CLINIC | Age: 64
End: 2024-12-19
Payer: MEDICARE

## 2024-12-19 PROBLEM — I10 HTN (HYPERTENSION): Status: ACTIVE | Noted: 2024-12-19

## 2024-12-19 PROBLEM — E78.5 HYPERLIPIDEMIA: Status: ACTIVE | Noted: 2024-12-19

## 2024-12-19 PROBLEM — D64.9 ANEMIA: Status: ACTIVE | Noted: 2024-12-19

## 2024-12-19 PROBLEM — M19.90 OSTEOARTHRITIS: Status: ACTIVE | Noted: 2024-12-19

## 2024-12-19 PROBLEM — E03.9 HYPOTHYROIDISM: Status: ACTIVE | Noted: 2024-12-19

## 2024-12-19 LAB
ANION GAP SERPL CALC-SCNC: 10 MMOL/L (ref 10–20)
ATRIAL RATE: 71 BPM
BUN SERPL-MCNC: 17 MG/DL (ref 6–23)
CALCIUM SERPL-MCNC: 10.4 MG/DL (ref 8.6–10.3)
CHLORIDE SERPL-SCNC: 104 MMOL/L (ref 98–107)
CO2 SERPL-SCNC: 27 MMOL/L (ref 21–32)
CREAT SERPL-MCNC: 1.04 MG/DL (ref 0.5–1.05)
EGFRCR SERPLBLD CKD-EPI 2021: 59 ML/MIN/1.73M*2
ERYTHROCYTE [DISTWIDTH] IN BLOOD BY AUTOMATED COUNT: 15.9 % (ref 11.5–14.5)
GLUCOSE SERPL-MCNC: 98 MG/DL (ref 74–99)
HCT VFR BLD AUTO: 30.1 % (ref 36–46)
HGB BLD-MCNC: 9.9 G/DL (ref 12–16)
HOLD SPECIMEN: NORMAL
MCH RBC QN AUTO: 30.7 PG (ref 26–34)
MCHC RBC AUTO-ENTMCNC: 32.9 G/DL (ref 32–36)
MCV RBC AUTO: 94 FL (ref 80–100)
NRBC BLD-RTO: 0 /100 WBCS (ref 0–0)
P AXIS: 63 DEGREES
P OFFSET: 195 MS
P ONSET: 152 MS
PLATELET # BLD AUTO: 291 X10*3/UL (ref 150–450)
POTASSIUM SERPL-SCNC: 3.9 MMOL/L (ref 3.5–5.3)
PR INTERVAL: 136 MS
Q ONSET: 220 MS
QRS COUNT: 12 BEATS
QRS DURATION: 86 MS
QT INTERVAL: 376 MS
QTC CALCULATION(BAZETT): 408 MS
QTC FREDERICIA: 397 MS
R AXIS: 16 DEGREES
RBC # BLD AUTO: 3.22 X10*6/UL (ref 4–5.2)
SODIUM SERPL-SCNC: 137 MMOL/L (ref 136–145)
T AXIS: 34 DEGREES
T OFFSET: 408 MS
VENTRICULAR RATE: 71 BPM
WBC # BLD AUTO: 8.8 X10*3/UL (ref 4.4–11.3)

## 2024-12-19 PROCEDURE — 3600000009 HC OR TIME - EACH INCREMENTAL 1 MINUTE - PROCEDURE LEVEL FOUR: Performed by: ORTHOPAEDIC SURGERY

## 2024-12-19 PROCEDURE — 27506 TREATMENT OF THIGH FRACTURE: CPT | Performed by: ORTHOPAEDIC SURGERY

## 2024-12-19 PROCEDURE — 1210000001 HC SEMI-PRIVATE ROOM DAILY

## 2024-12-19 PROCEDURE — 2500000001 HC RX 250 WO HCPCS SELF ADMINISTERED DRUGS (ALT 637 FOR MEDICARE OP): Performed by: ORTHOPAEDIC SURGERY

## 2024-12-19 PROCEDURE — 2780000003 HC OR 278 NO HCPCS: Performed by: ORTHOPAEDIC SURGERY

## 2024-12-19 PROCEDURE — 2500000004 HC RX 250 GENERAL PHARMACY W/ HCPCS (ALT 636 FOR OP/ED): Performed by: STUDENT IN AN ORGANIZED HEALTH CARE EDUCATION/TRAINING PROGRAM

## 2024-12-19 PROCEDURE — 0QS936Z REPOSITION LEFT FEMORAL SHAFT WITH INTRAMEDULLARY INTERNAL FIXATION DEVICE, PERCUTANEOUS APPROACH: ICD-10-PCS | Performed by: ORTHOPAEDIC SURGERY

## 2024-12-19 PROCEDURE — 3600000004 HC OR TIME - INITIAL BASE CHARGE - PROCEDURE LEVEL FOUR: Performed by: ORTHOPAEDIC SURGERY

## 2024-12-19 PROCEDURE — 2500000004 HC RX 250 GENERAL PHARMACY W/ HCPCS (ALT 636 FOR OP/ED): Performed by: NURSE PRACTITIONER

## 2024-12-19 PROCEDURE — 80048 BASIC METABOLIC PNL TOTAL CA: CPT | Performed by: NURSE PRACTITIONER

## 2024-12-19 PROCEDURE — 2500000004 HC RX 250 GENERAL PHARMACY W/ HCPCS (ALT 636 FOR OP/ED)

## 2024-12-19 PROCEDURE — 99232 SBSQ HOSP IP/OBS MODERATE 35: CPT | Performed by: STUDENT IN AN ORGANIZED HEALTH CARE EDUCATION/TRAINING PROGRAM

## 2024-12-19 PROCEDURE — 85027 COMPLETE CBC AUTOMATED: CPT | Performed by: NURSE PRACTITIONER

## 2024-12-19 PROCEDURE — 7100000001 HC RECOVERY ROOM TIME - INITIAL BASE CHARGE: Performed by: ORTHOPAEDIC SURGERY

## 2024-12-19 PROCEDURE — 2720000007 HC OR 272 NO HCPCS: Performed by: ORTHOPAEDIC SURGERY

## 2024-12-19 PROCEDURE — 2500000004 HC RX 250 GENERAL PHARMACY W/ HCPCS (ALT 636 FOR OP/ED): Performed by: ORTHOPAEDIC SURGERY

## 2024-12-19 PROCEDURE — 7100000002 HC RECOVERY ROOM TIME - EACH INCREMENTAL 1 MINUTE: Performed by: ORTHOPAEDIC SURGERY

## 2024-12-19 PROCEDURE — 36415 COLL VENOUS BLD VENIPUNCTURE: CPT | Performed by: NURSE PRACTITIONER

## 2024-12-19 PROCEDURE — C1769 GUIDE WIRE: HCPCS | Performed by: ORTHOPAEDIC SURGERY

## 2024-12-19 PROCEDURE — 51701 INSERT BLADDER CATHETER: CPT

## 2024-12-19 PROCEDURE — 99221 1ST HOSP IP/OBS SF/LOW 40: CPT | Performed by: ORTHOPAEDIC SURGERY

## 2024-12-19 PROCEDURE — C1713 ANCHOR/SCREW BN/BN,TIS/BN: HCPCS | Performed by: ORTHOPAEDIC SURGERY

## 2024-12-19 PROCEDURE — 27506 TREATMENT OF THIGH FRACTURE: CPT | Performed by: PHYSICIAN ASSISTANT

## 2024-12-19 PROCEDURE — 2500000001 HC RX 250 WO HCPCS SELF ADMINISTERED DRUGS (ALT 637 FOR MEDICARE OP): Performed by: STUDENT IN AN ORGANIZED HEALTH CARE EDUCATION/TRAINING PROGRAM

## 2024-12-19 PROCEDURE — 2500000004 HC RX 250 GENERAL PHARMACY W/ HCPCS (ALT 636 FOR OP/ED): Mod: JZ | Performed by: NURSE PRACTITIONER

## 2024-12-19 PROCEDURE — 2500000002 HC RX 250 W HCPCS SELF ADMINISTERED DRUGS (ALT 637 FOR MEDICARE OP, ALT 636 FOR OP/ED): Performed by: NURSE PRACTITIONER

## 2024-12-19 PROCEDURE — 3700000002 HC GENERAL ANESTHESIA TIME - EACH INCREMENTAL 1 MINUTE: Performed by: ORTHOPAEDIC SURGERY

## 2024-12-19 PROCEDURE — 3700000001 HC GENERAL ANESTHESIA TIME - INITIAL BASE CHARGE: Performed by: ORTHOPAEDIC SURGERY

## 2024-12-19 DEVICE — 10MM / TI CANN FRN / GT 360MM / LEFT - STERILE: Type: IMPLANTABLE DEVICE | Site: HIP | Status: FUNCTIONAL

## 2024-12-19 DEVICE — IMPLANTABLE DEVICE: Type: IMPLANTABLE DEVICE | Site: HIP | Status: FUNCTIONAL

## 2024-12-19 RX ORDER — CYCLOBENZAPRINE HCL 10 MG
10 TABLET ORAL 3 TIMES DAILY PRN
Status: DISCONTINUED | OUTPATIENT
Start: 2024-12-19 | End: 2024-12-24 | Stop reason: HOSPADM

## 2024-12-19 RX ORDER — PROCHLORPERAZINE 25 MG/1
25 SUPPOSITORY RECTAL EVERY 12 HOURS PRN
Status: DISCONTINUED | OUTPATIENT
Start: 2024-12-19 | End: 2024-12-24 | Stop reason: HOSPADM

## 2024-12-19 RX ORDER — OXYCODONE HYDROCHLORIDE 5 MG/1
10 TABLET ORAL EVERY 4 HOURS PRN
Status: DISCONTINUED | OUTPATIENT
Start: 2024-12-19 | End: 2024-12-20

## 2024-12-19 RX ORDER — LIDOCAINE HYDROCHLORIDE 20 MG/ML
INJECTION, SOLUTION INFILTRATION; PERINEURAL AS NEEDED
Status: DISCONTINUED | OUTPATIENT
Start: 2024-12-19 | End: 2024-12-19

## 2024-12-19 RX ORDER — PROPOFOL 10 MG/ML
INJECTION, EMULSION INTRAVENOUS AS NEEDED
Status: DISCONTINUED | OUTPATIENT
Start: 2024-12-19 | End: 2024-12-19

## 2024-12-19 RX ORDER — SODIUM CHLORIDE, SODIUM LACTATE, POTASSIUM CHLORIDE, CALCIUM CHLORIDE 600; 310; 30; 20 MG/100ML; MG/100ML; MG/100ML; MG/100ML
INJECTION, SOLUTION INTRAVENOUS CONTINUOUS PRN
Status: DISCONTINUED | OUTPATIENT
Start: 2024-12-19 | End: 2024-12-19

## 2024-12-19 RX ORDER — PREGABALIN 100 MG/1
100 CAPSULE ORAL DAILY
COMMUNITY

## 2024-12-19 RX ORDER — CEFAZOLIN SODIUM 2 G/100ML
2 INJECTION, SOLUTION INTRAVENOUS ONCE
Status: COMPLETED | OUTPATIENT
Start: 2024-12-19 | End: 2024-12-19

## 2024-12-19 RX ORDER — ATORVASTATIN CALCIUM 40 MG/1
40 TABLET, FILM COATED ORAL DAILY
COMMUNITY

## 2024-12-19 RX ORDER — LABETALOL HYDROCHLORIDE 5 MG/ML
5 INJECTION, SOLUTION INTRAVENOUS ONCE AS NEEDED
Status: DISCONTINUED | OUTPATIENT
Start: 2024-12-19 | End: 2024-12-19 | Stop reason: HOSPADM

## 2024-12-19 RX ORDER — BISACODYL 5 MG
10 TABLET, DELAYED RELEASE (ENTERIC COATED) ORAL DAILY PRN
Status: DISCONTINUED | OUTPATIENT
Start: 2024-12-19 | End: 2024-12-24 | Stop reason: HOSPADM

## 2024-12-19 RX ORDER — FENTANYL CITRATE 50 UG/ML
INJECTION, SOLUTION INTRAMUSCULAR; INTRAVENOUS AS NEEDED
Status: DISCONTINUED | OUTPATIENT
Start: 2024-12-19 | End: 2024-12-19

## 2024-12-19 RX ORDER — LEVOTHYROXINE SODIUM 25 UG/1
25 TABLET ORAL DAILY
COMMUNITY

## 2024-12-19 RX ORDER — OXYCODONE HYDROCHLORIDE 5 MG/1
5 TABLET ORAL EVERY 4 HOURS PRN
Status: DISCONTINUED | OUTPATIENT
Start: 2024-12-19 | End: 2024-12-20

## 2024-12-19 RX ORDER — MIDAZOLAM HYDROCHLORIDE 1 MG/ML
INJECTION, SOLUTION INTRAMUSCULAR; INTRAVENOUS AS NEEDED
Status: DISCONTINUED | OUTPATIENT
Start: 2024-12-19 | End: 2024-12-19

## 2024-12-19 RX ORDER — LEVOTHYROXINE SODIUM 25 UG/1
25 TABLET ORAL DAILY
Status: DISCONTINUED | OUTPATIENT
Start: 2024-12-19 | End: 2024-12-24 | Stop reason: HOSPADM

## 2024-12-19 RX ORDER — PROCHLORPERAZINE MALEATE 5 MG
10 TABLET ORAL EVERY 6 HOURS PRN
Status: DISCONTINUED | OUTPATIENT
Start: 2024-12-19 | End: 2024-12-24 | Stop reason: HOSPADM

## 2024-12-19 RX ORDER — HYDROMORPHONE HYDROCHLORIDE 1 MG/ML
0.6 INJECTION, SOLUTION INTRAMUSCULAR; INTRAVENOUS; SUBCUTANEOUS
Status: DISCONTINUED | OUTPATIENT
Start: 2024-12-19 | End: 2024-12-20

## 2024-12-19 RX ORDER — ATENOLOL 50 MG/1
TABLET ORAL DAILY
COMMUNITY

## 2024-12-19 RX ORDER — ACETAMINOPHEN 325 MG/1
650 TABLET ORAL EVERY 6 HOURS SCHEDULED
Status: DISCONTINUED | OUTPATIENT
Start: 2024-12-19 | End: 2024-12-24 | Stop reason: HOSPADM

## 2024-12-19 RX ORDER — CEFAZOLIN SODIUM 2 G/100ML
2 INJECTION, SOLUTION INTRAVENOUS EVERY 8 HOURS
Status: COMPLETED | OUTPATIENT
Start: 2024-12-20 | End: 2024-12-20

## 2024-12-19 RX ORDER — PHENYLEPHRINE HCL IN 0.9% NACL 1 MG/10 ML
SYRINGE (ML) INTRAVENOUS AS NEEDED
Status: DISCONTINUED | OUTPATIENT
Start: 2024-12-19 | End: 2024-12-19

## 2024-12-19 RX ORDER — DIPHENHYDRAMINE HYDROCHLORIDE 50 MG/ML
12.5 INJECTION INTRAMUSCULAR; INTRAVENOUS ONCE AS NEEDED
Status: DISCONTINUED | OUTPATIENT
Start: 2024-12-19 | End: 2024-12-19 | Stop reason: HOSPADM

## 2024-12-19 RX ORDER — SODIUM CHLORIDE, SODIUM LACTATE, POTASSIUM CHLORIDE, CALCIUM CHLORIDE 600; 310; 30; 20 MG/100ML; MG/100ML; MG/100ML; MG/100ML
50 INJECTION, SOLUTION INTRAVENOUS CONTINUOUS
Status: ACTIVE | OUTPATIENT
Start: 2024-12-19 | End: 2024-12-20

## 2024-12-19 RX ORDER — LEFLUNOMIDE 20 MG/1
20 TABLET ORAL DAILY
COMMUNITY

## 2024-12-19 RX ORDER — HYDRALAZINE HYDROCHLORIDE 20 MG/ML
5 INJECTION INTRAMUSCULAR; INTRAVENOUS EVERY 30 MIN PRN
Status: DISCONTINUED | OUTPATIENT
Start: 2024-12-19 | End: 2024-12-19 | Stop reason: HOSPADM

## 2024-12-19 RX ORDER — SODIUM CHLORIDE, SODIUM LACTATE, POTASSIUM CHLORIDE, CALCIUM CHLORIDE 600; 310; 30; 20 MG/100ML; MG/100ML; MG/100ML; MG/100ML
100 INJECTION, SOLUTION INTRAVENOUS CONTINUOUS
Status: DISCONTINUED | OUTPATIENT
Start: 2024-12-19 | End: 2024-12-19 | Stop reason: HOSPADM

## 2024-12-19 RX ORDER — ALBUTEROL SULFATE 0.83 MG/ML
2.5 SOLUTION RESPIRATORY (INHALATION) ONCE AS NEEDED
Status: DISCONTINUED | OUTPATIENT
Start: 2024-12-19 | End: 2024-12-19 | Stop reason: HOSPADM

## 2024-12-19 RX ORDER — ONDANSETRON 4 MG/1
4 TABLET, FILM COATED ORAL EVERY 8 HOURS PRN
Status: DISCONTINUED | OUTPATIENT
Start: 2024-12-19 | End: 2024-12-24 | Stop reason: HOSPADM

## 2024-12-19 RX ORDER — ONDANSETRON HYDROCHLORIDE 2 MG/ML
4 INJECTION, SOLUTION INTRAVENOUS ONCE AS NEEDED
Status: DISCONTINUED | OUTPATIENT
Start: 2024-12-19 | End: 2024-12-19 | Stop reason: HOSPADM

## 2024-12-19 RX ORDER — LEVOTHYROXINE SODIUM 25 UG/1
25 TABLET ORAL DAILY
Status: DISCONTINUED | OUTPATIENT
Start: 2024-12-20 | End: 2024-12-24 | Stop reason: HOSPADM

## 2024-12-19 RX ORDER — FENTANYL CITRATE 50 UG/ML
25 INJECTION, SOLUTION INTRAMUSCULAR; INTRAVENOUS EVERY 5 MIN PRN
Status: DISCONTINUED | OUTPATIENT
Start: 2024-12-19 | End: 2024-12-19 | Stop reason: HOSPADM

## 2024-12-19 RX ORDER — MEPERIDINE HYDROCHLORIDE 25 MG/ML
12.5 INJECTION INTRAMUSCULAR; INTRAVENOUS; SUBCUTANEOUS EVERY 10 MIN PRN
Status: DISCONTINUED | OUTPATIENT
Start: 2024-12-19 | End: 2024-12-19 | Stop reason: HOSPADM

## 2024-12-19 RX ORDER — ROCURONIUM BROMIDE 10 MG/ML
INJECTION, SOLUTION INTRAVENOUS AS NEEDED
Status: DISCONTINUED | OUTPATIENT
Start: 2024-12-19 | End: 2024-12-19

## 2024-12-19 RX ORDER — ONDANSETRON HYDROCHLORIDE 2 MG/ML
4 INJECTION, SOLUTION INTRAVENOUS EVERY 8 HOURS PRN
Status: DISCONTINUED | OUTPATIENT
Start: 2024-12-19 | End: 2024-12-24 | Stop reason: HOSPADM

## 2024-12-19 RX ORDER — HYDROMORPHONE HYDROCHLORIDE 1 MG/ML
0.4 INJECTION, SOLUTION INTRAMUSCULAR; INTRAVENOUS; SUBCUTANEOUS ONCE
Status: COMPLETED | OUTPATIENT
Start: 2024-12-19 | End: 2024-12-19

## 2024-12-19 RX ORDER — TRANEXAMIC ACID 650 MG/1
1950 TABLET ORAL ONCE
Status: COMPLETED | OUTPATIENT
Start: 2024-12-19 | End: 2024-12-19

## 2024-12-19 RX ORDER — ATENOLOL 50 MG/1
50 TABLET ORAL DAILY
Status: DISCONTINUED | OUTPATIENT
Start: 2024-12-19 | End: 2024-12-24 | Stop reason: HOSPADM

## 2024-12-19 RX ORDER — PROCHLORPERAZINE EDISYLATE 5 MG/ML
10 INJECTION INTRAMUSCULAR; INTRAVENOUS EVERY 6 HOURS PRN
Status: DISCONTINUED | OUTPATIENT
Start: 2024-12-19 | End: 2024-12-24 | Stop reason: HOSPADM

## 2024-12-19 RX ORDER — ONDANSETRON HYDROCHLORIDE 2 MG/ML
INJECTION, SOLUTION INTRAVENOUS AS NEEDED
Status: DISCONTINUED | OUTPATIENT
Start: 2024-12-19 | End: 2024-12-19

## 2024-12-19 RX ADMIN — HYDROMORPHONE HYDROCHLORIDE 0.6 MG: 1 INJECTION, SOLUTION INTRAMUSCULAR; INTRAVENOUS; SUBCUTANEOUS at 20:02

## 2024-12-19 RX ADMIN — CYCLOBENZAPRINE HYDROCHLORIDE 10 MG: 10 TABLET, FILM COATED ORAL at 21:49

## 2024-12-19 RX ADMIN — HYDROMORPHONE HYDROCHLORIDE 1 MG: 1 INJECTION, SOLUTION INTRAMUSCULAR; INTRAVENOUS; SUBCUTANEOUS at 02:12

## 2024-12-19 RX ADMIN — HYDROMORPHONE HYDROCHLORIDE 1 MG: 1 INJECTION, SOLUTION INTRAMUSCULAR; INTRAVENOUS; SUBCUTANEOUS at 08:37

## 2024-12-19 RX ADMIN — HEPARIN SODIUM 5000 UNITS: 5000 INJECTION INTRAVENOUS; SUBCUTANEOUS at 21:49

## 2024-12-19 RX ADMIN — ATENOLOL 50 MG: 50 TABLET ORAL at 08:37

## 2024-12-19 RX ADMIN — HYDROMORPHONE HYDROCHLORIDE 1 MG: 1 INJECTION, SOLUTION INTRAMUSCULAR; INTRAVENOUS; SUBCUTANEOUS at 11:43

## 2024-12-19 RX ADMIN — HYDROMORPHONE HYDROCHLORIDE 1 MG: 1 INJECTION, SOLUTION INTRAMUSCULAR; INTRAVENOUS; SUBCUTANEOUS at 04:45

## 2024-12-19 RX ADMIN — HYDROMORPHONE HYDROCHLORIDE 0.2 MG: 0.2 INJECTION, SOLUTION INTRAMUSCULAR; INTRAVENOUS; SUBCUTANEOUS at 06:09

## 2024-12-19 RX ADMIN — HYDROMORPHONE HYDROCHLORIDE 0.4 MG: 1 INJECTION, SOLUTION INTRAMUSCULAR; INTRAVENOUS; SUBCUTANEOUS at 15:18

## 2024-12-19 RX ADMIN — SODIUM CHLORIDE, POTASSIUM CHLORIDE, SODIUM LACTATE AND CALCIUM CHLORIDE 50 ML/HR: 600; 310; 30; 20 INJECTION, SOLUTION INTRAVENOUS at 18:22

## 2024-12-19 RX ADMIN — LEVOTHYROXINE SODIUM 25 MCG: 25 TABLET ORAL at 07:08

## 2024-12-19 RX ADMIN — HEPARIN SODIUM 5000 UNITS: 5000 INJECTION INTRAVENOUS; SUBCUTANEOUS at 06:04

## 2024-12-19 RX ADMIN — ACETAMINOPHEN 650 MG: 325 TABLET ORAL at 18:25

## 2024-12-19 RX ADMIN — HYDROMORPHONE HYDROCHLORIDE 0.2 MG: 0.2 INJECTION, SOLUTION INTRAMUSCULAR; INTRAVENOUS; SUBCUTANEOUS at 00:37

## 2024-12-19 RX ADMIN — TRANEXAMIC ACID 1950 MG: 650 TABLET ORAL at 14:22

## 2024-12-19 SDOH — HEALTH STABILITY: MENTAL HEALTH: CURRENT SMOKER: 0

## 2024-12-19 ASSESSMENT — PAIN SCALES - GENERAL
PAINLEVEL_OUTOF10: 0 - NO PAIN
PAINLEVEL_OUTOF10: 9
PAINLEVEL_OUTOF10: 10 - WORST POSSIBLE PAIN
PAINLEVEL_OUTOF10: 9
PAINLEVEL_OUTOF10: 7
PAINLEVEL_OUTOF10: 6
PAINLEVEL_OUTOF10: 8
PAINLEVEL_OUTOF10: 0 - NO PAIN
PAINLEVEL_OUTOF10: 5 - MODERATE PAIN
PAINLEVEL_OUTOF10: 0 - NO PAIN
PAINLEVEL_OUTOF10: 9
PAINLEVEL_OUTOF10: 0 - NO PAIN

## 2024-12-19 ASSESSMENT — COGNITIVE AND FUNCTIONAL STATUS - GENERAL
CLIMB 3 TO 5 STEPS WITH RAILING: A LOT
MOVING TO AND FROM BED TO CHAIR: TOTAL
TURNING FROM BACK TO SIDE WHILE IN FLAT BAD: TOTAL
MOVING FROM LYING ON BACK TO SITTING ON SIDE OF FLAT BED WITH BEDRAILS: A LOT
DAILY ACTIVITIY SCORE: 12
WALKING IN HOSPITAL ROOM: TOTAL
MOBILITY SCORE: 7
DRESSING REGULAR UPPER BODY CLOTHING: TOTAL
STANDING UP FROM CHAIR USING ARMS: TOTAL
DRESSING REGULAR LOWER BODY CLOTHING: TOTAL
MOVING FROM LYING ON BACK TO SITTING ON SIDE OF FLAT BED WITH BEDRAILS: A LITTLE
MOVING TO AND FROM BED TO CHAIR: A LITTLE
TOILETING: A LOT
CLIMB 3 TO 5 STEPS WITH RAILING: TOTAL
HELP NEEDED FOR BATHING: TOTAL
STANDING UP FROM CHAIR USING ARMS: A LOT
TURNING FROM BACK TO SIDE WHILE IN FLAT BAD: A LITTLE
PERSONAL GROOMING: A LITTLE
MOBILITY SCORE: 15
WALKING IN HOSPITAL ROOM: A LOT

## 2024-12-19 ASSESSMENT — PAIN SCALES - WONG BAKER
WONGBAKER_NUMERICALRESPONSE: NO HURT
WONGBAKER_NUMERICALRESPONSE: NO HURT

## 2024-12-19 ASSESSMENT — PAIN - FUNCTIONAL ASSESSMENT
PAIN_FUNCTIONAL_ASSESSMENT: 0-10

## 2024-12-19 ASSESSMENT — PAIN DESCRIPTION - LOCATION
LOCATION: LEG

## 2024-12-19 ASSESSMENT — PAIN DESCRIPTION - ORIENTATION
ORIENTATION: LEFT

## 2024-12-19 NOTE — ANESTHESIA PREPROCEDURE EVALUATION
Patient: Halie Martinez    Procedure Information       Date/Time: 12/19/24 1415    Procedure: Insertion Intramedullary Nail Femur (Left: Hip) - synthese nail    Location: [facility] / [facility]    Surgeons: Óscar Clarke MD            Relevant Problems   Cardiac   (+) HTN (hypertension)   (+) Hyperlipidemia      Endocrine   (+) Hyperparathyroidism (Multi)   (+) Hypothyroidism      Hematology   (+) Anemia      Musculoskeletal   (+) Closed displaced transverse fracture of shaft of left femur, initial encounter   (+) Osteoarthritis       Clinical information reviewed:   Tobacco  Allergies  Meds   Med Hx  Surg Hx   Fam Hx  Soc Hx        NPO Detail:  No data recorded     Physical Exam    Airway  Mallampati: III  TM distance: >3 FB     Cardiovascular - normal exam     Dental - normal exam     Pulmonary - normal exam     Abdominal - normal exam             Anesthesia Plan    History of general anesthesia?: yes  History of complications of general anesthesia?: no    ASA 3 - emergent     general   (GETA)  The patient is not a current smoker.    intravenous induction   Anesthetic plan and risks discussed with patient.  Use of blood products discussed with patient who consented to blood products.    Plan discussed with CRNA.

## 2024-12-19 NOTE — ANESTHESIA PROCEDURE NOTES
Airway  Date/Time: 12/19/2024 4:24 PM  Urgency: elective    Airway not difficult    Staffing  Performed: CRNA   Authorized by: Laya Anderson MD    Performed by: SUMMER Mars-CRNA, DNAP  Patient location during procedure: OR    Indications and Patient Condition  Indications for airway management: anesthesia  Spontaneous ventilation: present  Sedation level: minimal  Preoxygenated: yes  Patient position: sniffing  MILS maintained throughout  Mask difficulty assessment: 1 - vent by mask    Final Airway Details  Final airway type: endotracheal airway      Successful airway: ETT  Cuffed: yes   Successful intubation technique: direct laryngoscopy  Facilitating devices/methods: intubating stylet and anterior pressure/BURP  Endotracheal tube insertion site: oral  Blade: Nico  Blade size: #3  ETT size (mm): 7.5  Cormack-Lehane Classification: grade IIb - view of arytenoids or posterior of glottis only  Placement verified by: capnometry   Measured from: teeth  ETT to teeth (cm): 21  Number of attempts at approach: 1

## 2024-12-19 NOTE — PROGRESS NOTES
12/19/24 1222   Discharge Planning   Living Arrangements Alone   Support Systems None   Assistance Needed independent   Type of Residence Private residence   Home or Post Acute Services None   Does the patient need discharge transport arranged? Yes   RoundTrip coordination needed? Yes   Intensity of Service   Intensity of Service 0-30 min     Chart reviewed, writer spoke with patient- introduced self and explained role. Patient laying in bed resting. She fell at home with femur fx. Ortho is taking to surgery this afternoon for repair. Patient is alert and oriented x3. She lives alone 1 story with basement where is the walk-in shower. She has a full bathroom main level but not able to get in do to has to step over the tub. Patient reports ambulates independently has cane/ wheeled walker. She completed own adls and iadls. She continues to drive. She reports that she sponge baths as she does not go into the basement where has the walk-in shower and the main level has a tub shower which she cannot get in and transfer bench would not work as has shower doors. She has limited support. Her brother is listed as her emergency contact. Writer discussed discharge needs/ concerns after surgery SNF vs home with [facility]. She reports that she really has not thought about it but prefers to go home. Writer informed her therapy will work with her after surgery more likely tomorrow and will see how she is doing to assist with discharge planning. Patient was in agreement. Care Transition team to monitor care progression and address needs/ concerns as identified. DEMETRIUS Storm

## 2024-12-19 NOTE — H&P
"History Of Present Illness  Halie Martinez is a 64 y.o. female with a past medical history of RA who presented to the ED after a fall. She is unclear of the cause of the fall, noting she typically walks with a walker, but denies any dizziness, lightheadedness, or LOC. She reports feeling a \"pop\" and intense pain to her left leg. She reports overall feeling in her usual state of health outside of the pain in her leg.      Past Medical History  Past Medical History:   Diagnosis Date    Personal history of other diseases of the circulatory system     History of hypertension    Personal history of other diseases of the musculoskeletal system and connective tissue     History of arthritis    Personal history of other specified conditions     History of balance disorder       Surgical History  Past Surgical History:   Procedure Laterality Date    OTHER SURGICAL HISTORY  11/11/2022    Back surgery        Social History  She reports that she has never smoked. She has never used smokeless tobacco. No history on file for alcohol use and drug use.    Family History  No family history on file.     Allergies  Benazepril    Review of Systems   Constitutional:  Negative for chills and fever.   Respiratory:  Negative for shortness of breath.    Cardiovascular:  Negative for chest pain and palpitations.   Gastrointestinal:  Negative for abdominal pain, constipation, diarrhea, nausea and vomiting.   Genitourinary:  Negative for dysuria, flank pain, frequency, hematuria and urgency.   Musculoskeletal:  Positive for arthralgias and myalgias.   All other systems reviewed and are negative.       Physical Exam  Vitals reviewed.   HENT:      Head: Normocephalic and atraumatic.   Cardiovascular:      Rate and Rhythm: Normal rate and regular rhythm.      Heart sounds: Normal heart sounds.   Pulmonary:      Effort: Pulmonary effort is normal.      Breath sounds: Normal air entry.   Abdominal:      General: Bowel sounds are normal.      " "Palpations: Abdomen is soft.      Tenderness: There is no abdominal tenderness.   Musculoskeletal:         General: Tenderness and deformity present.   Skin:     General: Skin is warm and dry.   Neurological:      General: No focal deficit present.      Mental Status: She is alert and oriented to person, place, and time.   Psychiatric:         Mood and Affect: Mood normal.         Behavior: Behavior normal.          Last Recorded Vitals  Blood pressure 149/85, pulse 77, temperature 36.5 °C (97.7 °F), temperature source Temporal, resp. rate 17, height 1.626 m (5' 4\"), weight 81.6 kg (180 lb), SpO2 99%.    Relevant Results      Lab Results   Component Value Date    WBC 12.8 (H) 12/18/2024    HGB 10.6 (L) 12/18/2024    HCT 31.9 (L) 12/18/2024    MCV 94 12/18/2024     12/18/2024     Lab Results   Component Value Date    GLUCOSE 107 (H) 12/18/2024    CALCIUM 10.7 (H) 12/18/2024     12/18/2024    K 4.0 12/18/2024    CO2 25 12/18/2024     12/18/2024    BUN 14 12/18/2024    CREATININE 0.93 12/18/2024     ECG 12 lead  Normal sinus rhythm  Normal ECG  No previous ECGs available  XR chest 1 view  Narrative: Interpreted By:  Brandon Kimble,   STUDY:  XR CHEST 1 VIEW;  12/18/2024 5:40 pm      INDICATION:  Signs/Symptoms:fall.          COMPARISON:  None.      ACCESSION NUMBER(S):  [id number]      ORDERING CLINICIAN:  NAZ WHITE      FINDINGS:  AP radiograph of the chest was provided.              CARDIOMEDIASTINAL SILHOUETTE:  Cardiomediastinal silhouette is normal in size and configuration.      LUNGS:  Lungs are clear, without evidence of consolidation or pleural  effusion.      ABDOMEN:  No remarkable upper abdominal findings.      BONES:  No acute osseous changes.      Impression: 1.  No evidence of acute cardiopulmonary process.              MACRO:  None      Signed by: Brandon Kimble 12/18/2024 6:07 PM  Dictation workstation:   [id number]  XR femur left 2+ views, XR pelvis 1-2 " views  Narrative: Interpreted By:  Brandon Kimble,   STUDY:  XR PELVIS 1-2 VIEWS; XR FEMUR LEFT 2+ VIEWS; ;  12/18/2024 5:38 pm;  12/18/2024 5:40 pm      INDICATION:  Signs/Symptoms:fall.          COMPARISON:  None.      ACCESSION NUMBER(S):  [id number]; [id number]      ORDERING CLINICIAN:  NAZ WHITE      FINDINGS:  Single AP view of the pelvis and four views of the left femur are  provided for interpretation.      Pelvis:      No acute fracture or traumatic malalignment is identified in the  pelvis. Pelvic ring is intact. Visualized proximal femurs are  unremarkable in appearance. Degenerative changes are present in the  hips bilaterally with joint space narrowing and osteophytosis.      Postsurgical changes of posterior decompression and laminectomies  with posterior spinal fusion are present in the lower lumbar spine.  Soft tissues do not demonstrate any acute abnormality.      Left femur:      There is a transverse fracture present in the proximal left femoral  diaphysis, with full shaft medial displacement of the distal fracture  fragment and upwards of 7 cm of foreshortening. There is also mild  medial angulation of the fracture fragments.      There is likely associated soft tissue swelling. Visualized distal  left femur does not demonstrate any acute abnormality.      Impression: 1.  Transverse fracture from the proximal left femoral diaphysis with  medial angulation of the fracture fragments, full shaft displacement  of the distal fracture fragment and 7 cm of foreshortening.  2. No evidence of acute trauma to the pelvis.      MACRO:  None      Signed by: Brandon Kimble 12/18/2024 6:07 PM  Dictation workstation:   [id number]    ED Medication Administration from 12/18/2024 1623 to 12/18/2024 1927         Date/Time Order Dose Route Action Action by     12/18/2024 1710 EST fentaNYL PF (Sublimaze) injection 50 mcg 50 mcg intravenous Given JAVY Carrillo     12/18/2024 1919 EST HYDROmorphone  (Dilaudid) injection 0.5 mg 0.5 mg intravenous Given MADAY Arcos               Assessment/Plan   Assessment & Plan  Closed displaced transverse fracture of shaft of left femur, initial encounter      #Fall  #Left femur fx  -NPO MN  -Consult ortho  -Bedrest until cleared by ortho  -Dilaudid 1mg IV Q3HP, 0.2mg Q2 for breakthrough  -Falls precautions  -PT OT    #HTN  #HLD  #RA  #Hypothyroidism  -Continue home medications pending nursing review           Stephen Mcconnell, APRSIMON-CNP

## 2024-12-19 NOTE — CONSULTS
"Reason For Consult  Left transverse femur fracture    History Of Present Illness  Halie Martinez is a 64 y.o. female presenting with patient with ambulation and thigh pain ongoing over the last several days now with sudden onset fracture.  Most likely related to Fosamax or biphosphonate use    Now with transverse complete fracture displacement of the proximal third femur.     Past Medical History  She has a past medical history of Personal history of other diseases of the circulatory system, Personal history of other diseases of the musculoskeletal system and connective tissue, and Personal history of other specified conditions.    Surgical History  She has a past surgical history that includes Other surgical history (11/11/2022).     Social History  She reports that she has never smoked. She has never used smokeless tobacco. No history on file for alcohol use and drug use.    Family History  No family history on file.     Allergies  Benazepril    Review of Systems  Noncontributory     Physical Exam  Head normocephalic atraumatic  Heart regular rate rhythm   lungs are clear to auscultation percussion    Abdominal exam nontender nondistended    Left leg  Obvious deformity mild shortening external rotation  Compartments are soft neurovascular intact but swelling is noted no ecchymosis bruising or obvious incisions around the thigh are seen obvious tenderness to any range of motion due to fracture deformity     Last Recorded Vitals  Blood pressure 121/82, pulse 76, temperature 36.6 °C (97.9 °F), resp. rate 18, height 1.651 m (5' 5\"), weight 84.4 kg (186 lb 1.1 oz), SpO2 96%.    Relevant Results      Scheduled medications  atenolol, 50 mg, oral, Daily  heparin (porcine), 5,000 Units, subcutaneous, q8h  levothyroxine, 25 mcg, oral, Daily  pneumoc 20-elva conj-dip cr(PF), 0.5 mL, intramuscular, During hospitalization      Continuous medications     PRN medications  PRN medications: acetaminophen **OR** acetaminophen **OR** " acetaminophen, HYDROmorphone, HYDROmorphone, melatonin, oxyCODONE, oxyCODONE, polyethylene glycol  Results for orders placed or performed during the hospital encounter of 12/18/24 (from the past 24 hours)   CBC and Auto Differential   Result Value Ref Range    WBC 12.8 (H) 4.4 - 11.3 x10*3/uL    nRBC 0.0 0.0 - 0.0 /100 WBCs    RBC 3.41 (L) 4.00 - 5.20 x10*6/uL    Hemoglobin 10.6 (L) 12.0 - 16.0 g/dL    Hematocrit 31.9 (L) 36.0 - 46.0 %    MCV 94 80 - 100 fL    MCH 31.1 26.0 - 34.0 pg    MCHC 33.2 32.0 - 36.0 g/dL    RDW 15.7 (H) 11.5 - 14.5 %    Platelets 295 150 - 450 x10*3/uL    Neutrophils % 74.5 40.0 - 80.0 %    Immature Granulocytes %, Automated 0.7 0.0 - 0.9 %    Lymphocytes % 15.7 13.0 - 44.0 %    Monocytes % 7.8 2.0 - 10.0 %    Eosinophils % 0.8 0.0 - 6.0 %    Basophils % 0.5 0.0 - 2.0 %    Neutrophils Absolute 9.51 (H) 1.20 - 7.70 x10*3/uL    Immature Granulocytes Absolute, Automated 0.09 0.00 - 0.70 x10*3/uL    Lymphocytes Absolute 2.00 1.20 - 4.80 x10*3/uL    Monocytes Absolute 0.99 0.10 - 1.00 x10*3/uL    Eosinophils Absolute 0.10 0.00 - 0.70 x10*3/uL    Basophils Absolute 0.06 0.00 - 0.10 x10*3/uL   Comprehensive metabolic panel   Result Value Ref Range    Glucose 107 (H) 74 - 99 mg/dL    Sodium 137 136 - 145 mmol/L    Potassium 4.0 3.5 - 5.3 mmol/L    Chloride 103 98 - 107 mmol/L    Bicarbonate 25 21 - 32 mmol/L    Anion Gap 13 10 - 20 mmol/L    Urea Nitrogen 14 6 - 23 mg/dL    Creatinine 0.93 0.50 - 1.05 mg/dL    eGFR 68 >60 mL/min/1.73m*2    Calcium 10.7 (H) 8.6 - 10.3 mg/dL    Albumin 3.5 3.4 - 5.0 g/dL    Alkaline Phosphatase 90 33 - 136 U/L    Total Protein 6.2 (L) 6.4 - 8.2 g/dL    AST 25 9 - 39 U/L    Bilirubin, Total 0.5 0.0 - 1.2 mg/dL    ALT 23 7 - 45 U/L   Protime-INR   Result Value Ref Range    Protime 12.6 9.8 - 12.8 seconds    INR 1.1 0.9 - 1.1   aPTT   Result Value Ref Range    aPTT 35 27 - 38 seconds   ECG 12 lead   Result Value Ref Range    Ventricular Rate 71 BPM    Atrial Rate 71 BPM     MI Interval 136 ms    QRS Duration 86 ms    QT Interval 376 ms    QTC Calculation(Bazett) 408 ms    P Axis 63 degrees    R Axis 16 degrees    T Axis 34 degrees    QRS Count 12 beats    Q Onset 220 ms    P Onset 152 ms    P Offset 195 ms    T Offset 408 ms    QTC Fredericia 397 ms   CBC   Result Value Ref Range    WBC 8.8 4.4 - 11.3 x10*3/uL    nRBC 0.0 0.0 - 0.0 /100 WBCs    RBC 3.22 (L) 4.00 - 5.20 x10*6/uL    Hemoglobin 9.9 (L) 12.0 - 16.0 g/dL    Hematocrit 30.1 (L) 36.0 - 46.0 %    MCV 94 80 - 100 fL    MCH 30.7 26.0 - 34.0 pg    MCHC 32.9 32.0 - 36.0 g/dL    RDW 15.9 (H) 11.5 - 14.5 %    Platelets 291 150 - 450 x10*3/uL   Basic metabolic panel   Result Value Ref Range    Glucose 98 74 - 99 mg/dL    Sodium 137 136 - 145 mmol/L    Potassium 3.9 3.5 - 5.3 mmol/L    Chloride 104 98 - 107 mmol/L    Bicarbonate 27 21 - 32 mmol/L    Anion Gap 10 10 - 20 mmol/L    Urea Nitrogen 17 6 - 23 mg/dL    Creatinine 1.04 0.50 - 1.05 mg/dL    eGFR 59 (L) >60 mL/min/1.73m*2    Calcium 10.4 (H) 8.6 - 10.3 mg/dL        Assessment/Plan     Left transverse proximal third femur fracture  Patient will be n.p.o.  Site marked and identified  Risk and benefits of surgery discussed extensively with the patient.    Surgical risk included but were not limited to infection, wear, loosening, need for further surgery blood clot, failure to heal, failure of the surgery, stiffness, loss of limb life, extremity function change in length change, and associated risks of  any surgery.  Patient will now be on for intramedullary nailing of transverse femur fracture  Recommend adjustment of biphosphonate's in the perioperative and postoperative period    Óscar Clarke MD

## 2024-12-19 NOTE — ED PROCEDURE NOTE
Procedure  Orthopaedic Injury Treatment - Lower Extremity    Performed by: Mary Garay MD  Authorized by: Mary Garay MD    Consent:     Consent obtained:  Verbal    Consent given by:  Patient    Risks discussed:  Fracture    Alternatives discussed:  No treatment  Universal protocol:     Procedure explained and questions answered to patient or proxy's satisfaction: yes      Test results available: yes      Patient identity confirmed:  Verbally with patient and hospital-assigned identification number  Location:     Injury location: femur.  Pre-procedure details:     Distal perfusion: normal      Range of motion: normal    Sedation:     Sedation type:  None  Anesthesia:     Anesthesia method:  None  Procedure details:     Manipulation performed: no      Immobilization:  Splint    Splint type:  Long leg (placed by tech)    Supplies used:  Plaster  Post-procedure details:     Neurological function: normal      Distal perfusion: normal      Range of motion: unchanged      Procedure completion:  Tolerated               Mary Garay MD  12/18/24 0426

## 2024-12-19 NOTE — PROGRESS NOTES
"Halie Martinez is a 64 y.o. female on day 1 of admission presenting with Closed displaced transverse fracture of shaft of left femur, initial encounter.    Subjective   Patient seen and examined.  She is status post surgery, postop day 0 just came back to her room.  Denies any pain, denies any trouble breathing denies any sore throat she is hungry and wants to eat but is also feeling cold right now.       Objective     Physical Exam  Constitutional:       Comments: Pale appearing   HENT:      Head: Normocephalic and atraumatic.      Mouth/Throat:      Mouth: Mucous membranes are dry.   Eyes:      Extraocular Movements: Extraocular movements intact.   Cardiovascular:      Rate and Rhythm: Normal rate and regular rhythm.   Abdominal:      General: There is no distension.      Palpations: Abdomen is soft.   Musculoskeletal:         General: No swelling.      Cervical back: No rigidity.   Neurological:      General: No focal deficit present.      Mental Status: She is alert.   Psychiatric:         Mood and Affect: Mood normal.         Last Recorded Vitals  Blood pressure 140/81, pulse 69, temperature 36.8 °C (98.2 °F), resp. rate 15, height 1.651 m (5' 5\"), weight 84.4 kg (186 lb 1.1 oz), SpO2 93%.  Intake/Output last 3 Shifts:  No intake/output data recorded.    Relevant Results                              Assessment/Plan   Assessment & Plan  Closed displaced transverse fracture of shaft of left femur, initial encounter    HTN (hypertension)    Hyperlipidemia    Hypothyroidism    Osteoarthritis    Anemia    Status post left intramedullary nail fixation of femur fracture  Appreciate Ortho input  Continue pain control, supportive care  Repeat labs tomorrow  She can resume diet  Home meds once med rec is done except for levothyroxine which I will resume  DVT prophylaxis  PT OT             Ramez Washburn MD      "

## 2024-12-19 NOTE — CARE PLAN
The patient's goals for the shift include      The clinical goals for the shift include have adequate pain level      Problem: Pain - Adult  Goal: Verbalizes/displays adequate comfort level or baseline comfort level  Outcome: Progressing     Problem: Safety - Adult  Goal: Free from fall injury  Outcome: Progressing     Problem: Discharge Planning  Goal: Discharge to home or other facility with appropriate resources  Outcome: Progressing     Problem: Chronic Conditions and Co-morbidities  Goal: Patient's chronic conditions and co-morbidity symptoms are monitored and maintained or improved  Outcome: Progressing     Problem: Skin  Goal: Decreased wound size/increased tissue granulation at next dressing change  Outcome: Progressing  Goal: Participates in plan/prevention/treatment measures  Outcome: Progressing  Goal: Prevent/manage excess moisture  Outcome: Progressing  Goal: Prevent/minimize sheer/friction injuries  Outcome: Progressing  Goal: Promote/optimize nutrition  Outcome: Progressing  Goal: Promote skin healing  Outcome: Progressing     Problem: Fall/Injury  Goal: Not fall by end of shift  Outcome: Progressing  Goal: Be free from injury by end of the shift  Outcome: Progressing  Goal: Verbalize understanding of personal risk factors for fall in the hospital  Outcome: Progressing  Goal: Verbalize understanding of risk factor reduction measures to prevent injury from fall in the home  Outcome: Progressing  Goal: Use assistive devices by end of the shift  Outcome: Progressing  Goal: Pace activities to prevent fatigue by end of the shift  Outcome: Progressing     Problem: Pain  Goal: Takes deep breaths with improved pain control throughout the shift  Outcome: Progressing  Goal: Turns in bed with improved pain control throughout the shift  Outcome: Progressing  Goal: Walks with improved pain control throughout the shift  Outcome: Progressing  Goal: Performs ADL's with improved pain control throughout shift  Outcome:  Progressing  Goal: Participates in PT with improved pain control throughout the shift  Outcome: Progressing  Goal: Free from opioid side effects throughout the shift  Outcome: Progressing  Goal: Free from acute confusion related to pain meds throughout the shift  Outcome: Progressing

## 2024-12-19 NOTE — H&P
History and physical is reviewed, patient re evaluated, no changes.  Procedure, risks, benefits, and postoperative course were discussed with the patient and consent is reviewed.    Óscar Clarke MD

## 2024-12-19 NOTE — ANESTHESIA POSTPROCEDURE EVALUATION
Patient: Halie Martinez    Procedure Summary       Date: 12/19/24 Room / Location: [facility] / [facility]    Anesthesia Start: 1618 Anesthesia Stop: 1742    Procedure: Insertion Intramedullary Nail Femur (Left: Hip) Diagnosis:       Closed displaced transverse fracture of shaft of left femur, initial encounter      (Closed displaced transverse fracture of shaft of left femur, initial encounter [S72.322A])    Surgeons: Óscar Clarke MD Responsible Provider: Holland Mcgee DO    Anesthesia Type: general ASA Status: 3 - Emergent            Anesthesia Type: general    Vitals shown include unfiled device data.    Anesthesia Post Evaluation    Patient location during evaluation: PACU  Patient participation: complete - patient participated  Level of consciousness: awake  Pain management: adequate  Multimodal analgesia pain management approach  Airway patency: patent  Cardiovascular status: acceptable  Respiratory status: acceptable and face mask  Hydration status: acceptable  Postoperative Nausea and Vomiting: none        No notable events documented.

## 2024-12-19 NOTE — PROGRESS NOTES
Physical Therapy                 Therapy Communication Note    Patient Name: Halie Martinez  MRN: [id number]  Department: [facility]  Room: [facility]/1108-A  Today's Date: 12/19/2024 415    Discipline: Physical Therapy            Comment: Note patient has angulated femoral fracture. Suggest reorder post operatively

## 2024-12-19 NOTE — CARE PLAN
The patient's goals for the shift include      The clinical goals for the shift include have adequate pain level    Over the shift, the patient did not make progress toward the following goals. Barriers to progression include . Recommendations to address these barriers include   Problem: Pain - Adult  Goal: Verbalizes/displays adequate comfort level or baseline comfort level  Outcome: Progressing     Problem: Safety - Adult  Goal: Free from fall injury  Outcome: Progressing     Problem: Discharge Planning  Goal: Discharge to home or other facility with appropriate resources  Outcome: Progressing     Problem: Chronic Conditions and Co-morbidities  Goal: Patient's chronic conditions and co-morbidity symptoms are monitored and maintained or improved  Outcome: Progressing     Problem: Skin  Goal: Decreased wound size/increased tissue granulation at next dressing change  Outcome: Progressing  Flowsheets (Taken 12/19/2024 0141)  Decreased wound size/increased tissue granulation at next dressing change: Promote sleep for wound healing  Goal: Participates in plan/prevention/treatment measures  Outcome: Progressing  Flowsheets (Taken 12/19/2024 0141)  Participates in plan/prevention/treatment measures: Discuss with provider PT/OT consult  Goal: Prevent/manage excess moisture  Outcome: Progressing  Flowsheets (Taken 12/19/2024 0141)  Prevent/manage excess moisture:   Monitor for/manage infection if present   Moisturize dry skin  Goal: Prevent/minimize sheer/friction injuries  Outcome: Progressing  Flowsheets (Taken 12/19/2024 0141)  Prevent/minimize sheer/friction injuries:   Use pull sheet   HOB 30 degrees or less   Turn/reposition every 2 hours/use positioning/transfer devices  Goal: Promote/optimize nutrition  Outcome: Progressing  Flowsheets (Taken 12/19/2024 0141)  Promote/optimize nutrition:   Consume > 50% meals/supplements   Offer water/supplements/favorite foods  Goal: Promote skin healing  Outcome:  Progressing  Flowsheets (Taken 12/19/2024 0141)  Promote skin healing:   Assess skin/pad under line(s)/device(s)   Turn/reposition every 2 hours/use positioning/transfer devices     Problem: Fall/Injury  Goal: Not fall by end of shift  Outcome: Progressing  Goal: Be free from injury by end of the shift  Outcome: Progressing  Goal: Verbalize understanding of personal risk factors for fall in the hospital  Outcome: Progressing  Goal: Verbalize understanding of risk factor reduction measures to prevent injury from fall in the home  Outcome: Progressing  Goal: Use assistive devices by end of the shift  Outcome: Progressing  Goal: Pace activities to prevent fatigue by end of the shift  Outcome: Progressing     Problem: Pain  Goal: Takes deep breaths with improved pain control throughout the shift  Outcome: Progressing  Goal: Turns in bed with improved pain control throughout the shift  Outcome: Progressing  Goal: Walks with improved pain control throughout the shift  Outcome: Progressing  Goal: Performs ADL's with improved pain control throughout shift  Outcome: Progressing  Goal: Participates in PT with improved pain control throughout the shift  Outcome: Progressing  Goal: Free from opioid side effects throughout the shift  Outcome: Progressing  Goal: Free from acute confusion related to pain meds throughout the shift  Outcome: Progressing   .

## 2024-12-19 NOTE — PROGRESS NOTES
Occupational Therapy                 Therapy Communication Note    Patient Name: Halie Martinez  MRN: [id number]  Department: [facility]  Room: [facility]/1108-A  Today's Date: 12/19/2024     Discipline: Occupational Therapy            Comment: Pt has angulated femoral fracture, suggest reorder post operatively

## 2024-12-19 NOTE — OP NOTE
Insertion Intramedullary Nail Femur (L) Operative Note  Preop diagnosis: Left femoral shaft fracture    Postop diagnosis: Left femoral shaft fracture      Assistant: Óscar Torres physician assistant (PAC) was required and present throughout the entire case.  Given the nature of the disease process and the procedure, a skilled surgical first assistant was necessary during the entire case.  The assistant was necessary to hold retractors and manipulate extremity during the procedure.  A certified scrub tech was also present at the back table managing instruments with supplies for the surgical case    Date: 2024 - 2024  OR Location: [facility]    Name: Halie Martinez, : 1960, Age: 64 y.o., MRN: [id number], Sex: female    Diagnosis  Pre-op Diagnosis      * Closed displaced transverse fracture of shaft of left femur, initial encounter [S72.322A] Post-op Diagnosis     * Closed displaced transverse fracture of shaft of left femur, initial encounter [S72.322A]     Procedures  Insertion Intramedullary Nail Femur  52081 - GA OPTX FEM SHFT FX W/INSJ IMED IMPLT W/WO SCREW      Surgeons      * Óscar Clarke - Primary    Resident/Fellow/Other Assistant:  Surgeons and Role:     * Óscar Torres PA-C - Assisting    Staff:   Circulator: Elías Tellez Person: Mariana    Anesthesia Staff: Anesthesiologist: Laya Anderson MD  CRNA: SUMMER Mars-MARLENE, DNAP    Procedure Summary  Anesthesia: Anesthesia type not filed in the log.  ASA: ASA status not filed in the log.  Estimated Blood Loss: Less than 50 mL  Intra-op Medications:   Administrations occurring from 1415 to 1515 on 24:   Medication Name Total Dose   tranexamic acid (Lysteda) tablet 1,950 mg 1,950 mg              Anesthesia Record               Intraprocedure I/O Totals          Intake    LR infusion 1000.00 mL    ceFAZolin (Ancef) 2 g in dextrose (iso)  mL 100.00 mL    Total Intake 1100 mL       Output     Est. Blood Loss 100 mL    Total Output 100 mL       Net    Net Volume 1000 mL          Specimen: No specimens collected              Drains and/or Catheters:   External Urinary Catheter (Active)       Tourniquet Times:   * Missing tourniquet times found for documented tourniquets in lo *     Implants:  Implants       Type Name Action Serial No.      Screw NAIL, FEMORAL RECON, TROCHANTER 10MM 360L, LEFT - [id number] Implanted               Findings: see procedure details    Indications: Halie Martinez is an 64 y.o. female who is having surgery for Closed displaced transverse fracture of shaft of left femur, initial encounter [S72.322A].     The patient was seen in the preoperative area. The risks, benefits, complications, treatment options, non-operative alternatives, expected recovery and outcomes were discussed with the patient. The possibilities of reaction to medication, pulmonary aspiration, injury to surrounding structures, bleeding, recurrent infection, the need for additional procedures, failure to diagnose a condition, and creating a complication requiring transfusion or operation were discussed with the patient. The patient concurred with the proposed plan, giving informed consent.  The site of surgery was properly noted/marked if necessary per policy. The patient has been actively warmed in preoperative area. Preoperative antibiotics have been ordered and given within 1 hours of incision.    Procedure Details:   Patient is brought to op room 11 [facility].  Patient had routine prep and drape once they were anesthetized and placed on the fracture table.    At this time with traction and slight internal rotation we get anatomically aligned midshaft transverse fracture on the AP view and the lateral view there was a minimal step-off    At this time after C-arm guidance was utilized we made an entry point in the piriformis fossa with the aid of a 3 cm incision just extending from the tip of  the greater trochanteric process proximally    Once the guidepin entered the central aspect of the piriformis fossa on both AP and lateral view and oversize drill was used to open the canal    We then placed a flexible long wire with a slightly curved tip so that we could threaded across the fracture site    We drove the end of the wire to the knee and measured off the wire to obtain a 360 mm nail length    We sequentially reamed in half millimeter intervals from 8.5 mm to 11.5 mm    Proximally we reamed to 14 mm    We then selected the Synthes antegrade femoral nail to a length of 360 mm with a diameter of 10 mm    The nail was easily inserted across the fracture site further anatomically reducing the fracture    Using the outrigger system locking screw was placed proximally 78 mm from the greater to the lesser trochanteric process with excellent purchase the outrigger was removed permanent imaging was obtained    We then obtained permanent imaging of the fracture site    At this time distally we placed the locking screw using C arm guidance and a freehand technique drill was placed from lateral to medial across the nail site and then we placed our standard 38 mm screw it had excellent purchase    Final imaging was captured under AP lateral view of the entire nail distally midshaft and proximally    All wounds were lavaged irrigated and closed with 0 Vicryl 2-0 Vicryl skin staples compressive dressing and the patient was transferred to recovery room after anesthesia reversed and she was carefully taken off the fracture table    Complications:  None; patient tolerated the procedure well.    Disposition: PACU - hemodynamically stable.  Condition: stable         Óscar Clarke  Phone Number: [phone]

## 2024-12-20 LAB
ANION GAP SERPL CALC-SCNC: 11 MMOL/L (ref 10–20)
BUN SERPL-MCNC: 16 MG/DL (ref 6–23)
CALCIUM SERPL-MCNC: 10.1 MG/DL (ref 8.6–10.3)
CHLORIDE SERPL-SCNC: 103 MMOL/L (ref 98–107)
CO2 SERPL-SCNC: 28 MMOL/L (ref 21–32)
CREAT SERPL-MCNC: 1.07 MG/DL (ref 0.5–1.05)
EGFRCR SERPLBLD CKD-EPI 2021: 57 ML/MIN/1.73M*2
ERYTHROCYTE [DISTWIDTH] IN BLOOD BY AUTOMATED COUNT: 15.9 % (ref 11.5–14.5)
GLUCOSE SERPL-MCNC: 124 MG/DL (ref 74–99)
HCT VFR BLD AUTO: 25.3 % (ref 36–46)
HGB BLD-MCNC: 8.6 G/DL (ref 12–16)
MCH RBC QN AUTO: 31.5 PG (ref 26–34)
MCHC RBC AUTO-ENTMCNC: 34 G/DL (ref 32–36)
MCV RBC AUTO: 93 FL (ref 80–100)
NRBC BLD-RTO: 0 /100 WBCS (ref 0–0)
PLATELET # BLD AUTO: 258 X10*3/UL (ref 150–450)
POTASSIUM SERPL-SCNC: 4.9 MMOL/L (ref 3.5–5.3)
RBC # BLD AUTO: 2.73 X10*6/UL (ref 4–5.2)
SODIUM SERPL-SCNC: 137 MMOL/L (ref 136–145)
WBC # BLD AUTO: 11.7 X10*3/UL (ref 4.4–11.3)

## 2024-12-20 PROCEDURE — 1210000001 HC SEMI-PRIVATE ROOM DAILY

## 2024-12-20 PROCEDURE — 36415 COLL VENOUS BLD VENIPUNCTURE: CPT | Performed by: ORTHOPAEDIC SURGERY

## 2024-12-20 PROCEDURE — 97165 OT EVAL LOW COMPLEX 30 MIN: CPT | Mod: GO

## 2024-12-20 PROCEDURE — 80048 BASIC METABOLIC PNL TOTAL CA: CPT | Performed by: ORTHOPAEDIC SURGERY

## 2024-12-20 PROCEDURE — 2500000001 HC RX 250 WO HCPCS SELF ADMINISTERED DRUGS (ALT 637 FOR MEDICARE OP): Performed by: STUDENT IN AN ORGANIZED HEALTH CARE EDUCATION/TRAINING PROGRAM

## 2024-12-20 PROCEDURE — 85027 COMPLETE CBC AUTOMATED: CPT | Performed by: ORTHOPAEDIC SURGERY

## 2024-12-20 PROCEDURE — 2500000002 HC RX 250 W HCPCS SELF ADMINISTERED DRUGS (ALT 637 FOR MEDICARE OP, ALT 636 FOR OP/ED): Performed by: ORTHOPAEDIC SURGERY

## 2024-12-20 PROCEDURE — 2500000004 HC RX 250 GENERAL PHARMACY W/ HCPCS (ALT 636 FOR OP/ED): Mod: JZ | Performed by: ORTHOPAEDIC SURGERY

## 2024-12-20 PROCEDURE — 2500000001 HC RX 250 WO HCPCS SELF ADMINISTERED DRUGS (ALT 637 FOR MEDICARE OP)

## 2024-12-20 PROCEDURE — 2500000004 HC RX 250 GENERAL PHARMACY W/ HCPCS (ALT 636 FOR OP/ED): Performed by: STUDENT IN AN ORGANIZED HEALTH CARE EDUCATION/TRAINING PROGRAM

## 2024-12-20 PROCEDURE — 2500000001 HC RX 250 WO HCPCS SELF ADMINISTERED DRUGS (ALT 637 FOR MEDICARE OP): Performed by: ORTHOPAEDIC SURGERY

## 2024-12-20 PROCEDURE — 99231 SBSQ HOSP IP/OBS SF/LOW 25: CPT

## 2024-12-20 PROCEDURE — 97161 PT EVAL LOW COMPLEX 20 MIN: CPT | Mod: GP

## 2024-12-20 PROCEDURE — 99232 SBSQ HOSP IP/OBS MODERATE 35: CPT | Performed by: STUDENT IN AN ORGANIZED HEALTH CARE EDUCATION/TRAINING PROGRAM

## 2024-12-20 RX ORDER — LEFLUNOMIDE 20 MG/1
20 TABLET ORAL DAILY
Status: DISCONTINUED | OUTPATIENT
Start: 2024-12-20 | End: 2024-12-24 | Stop reason: HOSPADM

## 2024-12-20 RX ORDER — OXYCODONE HYDROCHLORIDE 5 MG/1
10 TABLET ORAL EVERY 4 HOURS PRN
Status: DISCONTINUED | OUTPATIENT
Start: 2024-12-20 | End: 2024-12-24 | Stop reason: HOSPADM

## 2024-12-20 RX ORDER — PREGABALIN 50 MG/1
100 CAPSULE ORAL DAILY
Status: DISCONTINUED | OUTPATIENT
Start: 2024-12-20 | End: 2024-12-24 | Stop reason: HOSPADM

## 2024-12-20 RX ORDER — MV-MIN/FA/VIT K/LUTEIN/ZEAXANT 200MCG-5MG
1 CAPSULE ORAL
COMMUNITY

## 2024-12-20 RX ORDER — ATORVASTATIN CALCIUM 20 MG/1
40 TABLET, FILM COATED ORAL DAILY
Status: DISCONTINUED | OUTPATIENT
Start: 2024-12-20 | End: 2024-12-24 | Stop reason: HOSPADM

## 2024-12-20 RX ORDER — CYCLOBENZAPRINE HCL 10 MG
10 TABLET ORAL 3 TIMES DAILY PRN
Start: 2024-12-20 | End: 2024-12-27

## 2024-12-20 RX ORDER — OXYCODONE HYDROCHLORIDE 5 MG/1
5 TABLET ORAL EVERY 4 HOURS PRN
Status: DISCONTINUED | OUTPATIENT
Start: 2024-12-20 | End: 2024-12-24 | Stop reason: HOSPADM

## 2024-12-20 RX ORDER — OXYCODONE HYDROCHLORIDE 5 MG/1
5 TABLET ORAL EVERY 6 HOURS PRN
Qty: 28 TABLET | Refills: 0 | Status: SHIPPED | OUTPATIENT
Start: 2024-12-20 | End: 2024-12-27

## 2024-12-20 RX ADMIN — RIVAROXABAN 10 MG: 10 TABLET, FILM COATED ORAL at 08:52

## 2024-12-20 RX ADMIN — OXYCODONE 10 MG: 5 TABLET ORAL at 19:38

## 2024-12-20 RX ADMIN — HYDROMORPHONE HYDROCHLORIDE 0.6 MG: 1 INJECTION, SOLUTION INTRAMUSCULAR; INTRAVENOUS; SUBCUTANEOUS at 05:06

## 2024-12-20 RX ADMIN — LEFLUNOMIDE 20 MG: 20 TABLET, FILM COATED ORAL at 20:38

## 2024-12-20 RX ADMIN — PREGABALIN 100 MG: 50 CAPSULE ORAL at 20:38

## 2024-12-20 RX ADMIN — CEFAZOLIN SODIUM 2 G: 2 INJECTION, SOLUTION INTRAVENOUS at 01:29

## 2024-12-20 RX ADMIN — HYDROMORPHONE HYDROCHLORIDE 0.6 MG: 1 INJECTION, SOLUTION INTRAMUSCULAR; INTRAVENOUS; SUBCUTANEOUS at 00:23

## 2024-12-20 RX ADMIN — ATENOLOL 50 MG: 50 TABLET ORAL at 06:43

## 2024-12-20 RX ADMIN — ACETAMINOPHEN 650 MG: 325 TABLET ORAL at 18:25

## 2024-12-20 RX ADMIN — ATORVASTATIN CALCIUM 40 MG: 20 TABLET, FILM COATED ORAL at 20:38

## 2024-12-20 RX ADMIN — HEPARIN SODIUM 5000 UNITS: 5000 INJECTION INTRAVENOUS; SUBCUTANEOUS at 05:06

## 2024-12-20 RX ADMIN — OXYCODONE 10 MG: 5 TABLET ORAL at 10:41

## 2024-12-20 RX ADMIN — OXYCODONE 10 MG: 5 TABLET ORAL at 15:27

## 2024-12-20 RX ADMIN — HYDROMORPHONE HYDROCHLORIDE 0.2 MG: 0.2 INJECTION, SOLUTION INTRAMUSCULAR; INTRAVENOUS; SUBCUTANEOUS at 02:11

## 2024-12-20 RX ADMIN — Medication 10 MG: at 23:16

## 2024-12-20 RX ADMIN — ACETAMINOPHEN 650 MG: 325 TABLET ORAL at 00:22

## 2024-12-20 RX ADMIN — ACETAMINOPHEN 650 MG: 325 TABLET ORAL at 06:43

## 2024-12-20 RX ADMIN — ACETAMINOPHEN 650 MG: 325 TABLET ORAL at 11:29

## 2024-12-20 RX ADMIN — SODIUM CHLORIDE, POTASSIUM CHLORIDE, SODIUM LACTATE AND CALCIUM CHLORIDE 50 ML/HR: 600; 310; 30; 20 INJECTION, SOLUTION INTRAVENOUS at 08:53

## 2024-12-20 RX ADMIN — LEVOTHYROXINE SODIUM 25 MCG: 25 TABLET ORAL at 06:43

## 2024-12-20 RX ADMIN — CEFAZOLIN SODIUM 2 G: 2 INJECTION, SOLUTION INTRAVENOUS at 08:52

## 2024-12-20 ASSESSMENT — PAIN DESCRIPTION - LOCATION
LOCATION: LEG

## 2024-12-20 ASSESSMENT — COGNITIVE AND FUNCTIONAL STATUS - GENERAL
MOVING TO AND FROM BED TO CHAIR: A LOT
DRESSING REGULAR UPPER BODY CLOTHING: A LITTLE
DAILY ACTIVITIY SCORE: 18
MOBILITY SCORE: 14
CLIMB 3 TO 5 STEPS WITH RAILING: TOTAL
HELP NEEDED FOR BATHING: A LOT
CLIMB 3 TO 5 STEPS WITH RAILING: TOTAL
TOILETING: A LOT
MOVING FROM LYING ON BACK TO SITTING ON SIDE OF FLAT BED WITH BEDRAILS: A LITTLE
MOBILITY SCORE: 10
WALKING IN HOSPITAL ROOM: TOTAL
MOVING FROM LYING ON BACK TO SITTING ON SIDE OF FLAT BED WITH BEDRAILS: A LOT
PERSONAL GROOMING: A LITTLE
TOILETING: A LOT
DRESSING REGULAR LOWER BODY CLOTHING: A LOT
TURNING FROM BACK TO SIDE WHILE IN FLAT BAD: A LOT
HELP NEEDED FOR BATHING: A LITTLE
STANDING UP FROM CHAIR USING ARMS: A LITTLE
WALKING IN HOSPITAL ROOM: A LOT
STANDING UP FROM CHAIR USING ARMS: A LOT
DAILY ACTIVITIY SCORE: 16
DRESSING REGULAR UPPER BODY CLOTHING: A LITTLE
DRESSING REGULAR LOWER BODY CLOTHING: A LOT
TURNING FROM BACK TO SIDE WHILE IN FLAT BAD: A LITTLE
MOVING TO AND FROM BED TO CHAIR: A LOT

## 2024-12-20 ASSESSMENT — PAIN DESCRIPTION - ORIENTATION
ORIENTATION: LEFT

## 2024-12-20 ASSESSMENT — PAIN SCALES - GENERAL
PAINLEVEL_OUTOF10: 8
PAINLEVEL_OUTOF10: 4
PAINLEVEL_OUTOF10: 7
PAINLEVEL_OUTOF10: 7
PAINLEVEL_OUTOF10: 8
PAINLEVEL_OUTOF10: 0 - NO PAIN

## 2024-12-20 ASSESSMENT — PAIN - FUNCTIONAL ASSESSMENT
PAIN_FUNCTIONAL_ASSESSMENT: 0-10

## 2024-12-20 ASSESSMENT — ACTIVITIES OF DAILY LIVING (ADL): BATHING_ASSISTANCE: MODERATE

## 2024-12-20 NOTE — PROGRESS NOTES
Hip surgery    Progress Note    Subjective:     Post-Operative Day: 1  Status Post left intramedullary nail fixation of femur fracture     Systemic or Specific Complaints: No Complaints    Objective:     Visit Vitals  /63 (BP Location: Right arm, Patient Position: Lying)   Pulse 76   Temp 36.4 °C (97.5 °F) (Temporal)   Resp 18       General: alert and oriented, in no acute distress, appears stated age, and cooperative   Wound: no erythema, no edema, no drainage, and Mepilex dressing remains clean, dry, and intact   Motion: Painful range of Motion   DVT Exam: No evidence of DVT seen on physical exam.  Negative Pratik's sign.  No significant calf/ankle edema.       NVI in lower extremity. left thigh swollen but soft to palpation. Patient reports baseline foot drop LLE. Good distal pulses bilaterally.      Data Review  Recent Results (from the past 24 hours)   CBC    Collection Time: 12/20/24  6:49 AM   Result Value Ref Range    WBC 11.7 (H) 4.4 - 11.3 x10*3/uL    nRBC 0.0 0.0 - 0.0 /100 WBCs    RBC 2.73 (L) 4.00 - 5.20 x10*6/uL    Hemoglobin 8.6 (L) 12.0 - 16.0 g/dL    Hematocrit 25.3 (L) 36.0 - 46.0 %    MCV 93 80 - 100 fL    MCH 31.5 26.0 - 34.0 pg    MCHC 34.0 32.0 - 36.0 g/dL    RDW 15.9 (H) 11.5 - 14.5 %    Platelets 258 150 - 450 x10*3/uL   Basic metabolic panel    Collection Time: 12/20/24  6:49 AM   Result Value Ref Range    Glucose 124 (H) 74 - 99 mg/dL    Sodium 137 136 - 145 mmol/L    Potassium 4.9 3.5 - 5.3 mmol/L    Chloride 103 98 - 107 mmol/L    Bicarbonate 28 21 - 32 mmol/L    Anion Gap 11 10 - 20 mmol/L    Urea Nitrogen 16 6 - 23 mg/dL    Creatinine 1.07 (H) 0.50 - 1.05 mg/dL    eGFR 57 (L) >60 mL/min/1.73m*2    Calcium 10.1 8.6 - 10.3 mg/dL       Assessment:     Status Post left intramedullary nail fixation of femur fracture. Doing well postoperatively. No acute events overnight.    Acute postoperative pain: Reports mild to moderate pain to operative extremity. Exacerbated by movement,  relieved by rest ice and pain medication.  Continue current pain management.     Acute postoperative blood loss anemia: Secondary to expected surgical blood loss. Hemoglobin this A.M. 8.6.  Patient asymptomatic, and remains hemodynamically stable with no signs of active bleeding. Transfuse hgb 7 or below     Leukocytosis: WBC count this morning 11.7.  No acute signs of infection.  Patient afebrile, remains hemodynamically stable denies any cough, abdominal pain, or urinary symptoms. Transient elevation in white blood count is most likely secondary to stress and inflammatory process from surgery.    Plan:      1. Continues current post-op course  2.  Continue Deep venous thrombosis prophylaxis: Xarelto 10 mg daily for 28 days  3.  Continue physical therapy: 10% Weightbearing with use of walker for assistance with ambulation  4.  Continue Pain Control  5.  Discharge planning: SNF vs [facility] upon discharge, awaiting PT/OT evaluation and recs. SW and TCC following for discharge planning.     BELTRAN Alexander   12/20/2024 7:51 AM

## 2024-12-20 NOTE — CARE PLAN
The patient's goals for the shift include      The clinical goals for the shift include Safety    Over the shift, the patient did not make progress toward the following goals. Barriers to progression include . Recommendations to address these barriers include   Problem: Pain - Adult  Goal: Verbalizes/displays adequate comfort level or baseline comfort level  Outcome: Progressing     Problem: Safety - Adult  Goal: Free from fall injury  Outcome: Progressing     Problem: Discharge Planning  Goal: Discharge to home or other facility with appropriate resources  Outcome: Progressing     Problem: Chronic Conditions and Co-morbidities  Goal: Patient's chronic conditions and co-morbidity symptoms are monitored and maintained or improved  Outcome: Progressing     Problem: Skin  Goal: Decreased wound size/increased tissue granulation at next dressing change  Outcome: Progressing  Flowsheets (Taken 12/20/2024 0607)  Decreased wound size/increased tissue granulation at next dressing change: Promote sleep for wound healing  Goal: Participates in plan/prevention/treatment measures  Outcome: Progressing  Flowsheets (Taken 12/20/2024 0607)  Participates in plan/prevention/treatment measures:   Discuss with provider PT/OT consult   Increase activity/out of bed for meals  Goal: Prevent/manage excess moisture  Outcome: Progressing  Flowsheets (Taken 12/20/2024 0607)  Prevent/manage excess moisture:   Monitor for/manage infection if present   Moisturize dry skin  Goal: Prevent/minimize sheer/friction injuries  Outcome: Progressing  Flowsheets (Taken 12/20/2024 0607)  Prevent/minimize sheer/friction injuries:   Increase activity/out of bed for meals   HOB 30 degrees or less   Use pull sheet  Goal: Promote/optimize nutrition  Outcome: Progressing  Flowsheets (Taken 12/20/2024 0607)  Promote/optimize nutrition:   Monitor/record intake including meals   Consume > 50% meals/supplements   Offer water/supplements/favorite foods  Goal: Promote  skin healing  Outcome: Progressing  Flowsheets (Taken 12/20/2024 0607)  Promote skin healing:   Assess skin/pad under line(s)/device(s)   Rotate device position/do not position patient on device     Problem: Fall/Injury  Goal: Not fall by end of shift  Outcome: Progressing  Goal: Be free from injury by end of the shift  Outcome: Progressing  Goal: Verbalize understanding of personal risk factors for fall in the hospital  Outcome: Progressing  Goal: Verbalize understanding of risk factor reduction measures to prevent injury from fall in the home  Outcome: Progressing  Goal: Use assistive devices by end of the shift  Outcome: Progressing  Goal: Pace activities to prevent fatigue by end of the shift  Outcome: Progressing     Problem: Pain  Goal: Takes deep breaths with improved pain control throughout the shift  Outcome: Progressing  Goal: Turns in bed with improved pain control throughout the shift  Outcome: Progressing  Goal: Walks with improved pain control throughout the shift  Outcome: Progressing  Goal: Performs ADL's with improved pain control throughout shift  Outcome: Progressing  Goal: Participates in PT with improved pain control throughout the shift  Outcome: Progressing  Goal: Free from opioid side effects throughout the shift  Outcome: Progressing  Goal: Free from acute confusion related to pain meds throughout the shift  Outcome: Progressing   .

## 2024-12-20 NOTE — PROGRESS NOTES
Physical Therapy    Physical Therapy Evaluation    Patient Name: Halie Martinez  MRN: [id number]  Today's Date: 12/20/2024   Time Calculation  Start Time: 0940  Stop Time: 0959  Time Calculation (min): 19 min  1108/1108-A    Assessment/Plan   PT Assessment  PT Assessment Results: Decreased strength, Decreased endurance, Impaired balance, Decreased mobility, Decreased coordination, Pain, Orthopedic restrictions  Rehab Prognosis: Fair  Evaluation/Treatment Tolerance: Patient limited by fatigue, Patient limited by pain  Barriers to Participation: Comorbidities  End of Session Communication: Bedside nurse  Assessment Comment: pt with decreased mobility/gait stength balance endurance . pt to benefit from skilled PT to address deficits and improve functional mobility  End of Session Patient Position: Up in chair, Alarm on  IP OR SWING BED PT PLAN  Inpatient or Swing Bed: Inpatient  PT Plan  Treatment/Interventions: Bed mobility, Transfer training, Gait training, Stair training, Balance training, Strengthening, Endurance training, Therapeutic exercise, Therapeutic activity, Home exercise program, Range of motion, Positioning  PT Plan: Ongoing PT  PT Frequency: 4 times per week  PT Discharge Recommendations: Moderate intensity level of continued care  PT Recommended Transfer Status: Assist x1, Assist x2, Assistive device  PT - OK to Discharge: Yes (once medically ready for discharge to next level of care.)    Subjective     Current Problem:  1. Closed displaced transverse fracture of shaft of left femur, initial encounter  Case Request Operating Room: Insertion Intramedullary Nail Femur    Case Request Operating Room: Insertion Intramedullary Nail Femur    cyclobenzaprine (Flexeril) 10 mg tablet    oxyCODONE (Roxicodone) 5 mg immediate release tablet    rivaroxaban (Xarelto) 10 mg tablet          General Visit Information:  General  Reason for Referral: weakness/ impaired mobility  Referred By: Rani OT/PT 12/20  Past  Medical History Relevant to Rehab: HTN, arthritis, L foot fx, back fusion, balance disorder, drop foot (states she wears AFOs on B ankles)  Family/Caregiver Present: No  Co-Treatment: OT  Co-Treatment Reason: to maximize pt. safety  Prior to Session Communication: Bedside nurse (cleared to see for therapy evals)  Patient Position Received: Bed, 3 rail up, Alarm on  General Comment: Pt. is 65 y/o female to ED 12/18 s/p fall at home. XR femur (+) Left transverse fx of proximal femoral diaphysis. Pt. underwent ORIF L  femur with Dr. Clarke 12/19.  Home Living:  Home Living  Home Living Comments: Pt. lives alone in one story house. Has 3 steps up to kitchen level. Tub shower on main floor (pt. states this shower is not able to be used). Has walk in shower in basement level. Seat won't fit in basement shower. HRs. on basement stairs.  Prior Level of Function:  Prior Function Per Pt/Caregiver Report  Prior Function Comments: Pt .states she is independent with mobility , ADLs/IADLs PTA. Has assistance for outside yard work. one fall. Drives. owns WW and cane  Precautions:  Precautions  LE Weight Bearing Status: Left Partial Weight Bearing (10 % wt bearing L LE)  Medical Precautions: Fall precautions  Vital Signs:  Objective   Pain:  Pain Assessment  Pain Assessment: 0-10  0-10 (Numeric) Pain Score: 8  Pain Type: Acute pain, Surgical pain  Pain Location: Leg  Pain Orientation: Left  Cognition:  Cognition  Overall Cognitive Status: Within Functional Limits  Orientation Level: Oriented X4  General Assessments:  Activity Tolerance  Endurance: Decreased tolerance for upright activites  Sensation  Sensation Comment: intact bLEs  Strength  Strength Comments: R LE 4-/5   L LE hip /knee 2+/5  ankle has drop foot 1+/5  Coordination  Movements are Fluid and Coordinated: No     Static Sitting Balance  Static Sitting-Comment/Number of Minutes: fair  Dynamic Sitting Balance  Dynamic Sitting-Comments: fair  Static Standing  Balance  Static Standing-Comment/Number of Minutes: poor +  Dynamic Standing Balance  Dynamic Standing-Comments: poor +  Functional Assessments:  Bed Mobility  Bed Mobility: Yes  Bed Mobility 1  Bed Mobility 1: Supine to sitting  Level of Assistance 1: Moderate assistance  Bed Mobility Comments 1: cues to reach for rail and scoot out ot EOB .  assistance with LLE  Transfers  Transfer: Yes  Transfer 1  Technique 1: Sit to stand, Stand to sit  Transfer Device 1: Walker (FWW)  Transfer Level of Assistance 1: Moderate assistance, +2, Minimal verbal cues  Trials/Comments 1: cues with FWW for 10 % wt bearing on LLE .  mod A  Transfers 2  Technique 2: Stand pivot  Transfer Device 2: Walker (FWW)  Transfer Level of Assistance 2: Moderate assistance, +2  Trials/Comments 2: mod A x 2 with FWW SPT to chair with cues for 10 % wt bearing LLE  cues to keep heel up off floor.  pt with difficulty with Wt bearing  Ambulation/Gait Training  Ambulation/Gait Training Performed: No  Extremity/Trunk Assessments:  RLE   RLE : Within Functional Limits  LLE   LLE :  (limited L Hip s/p  IM femoral nailing)  Outcome Measures:  [facility] Basic Mobility  Turning from your back to your side while in a flat bed without using bedrails: A lot  Moving from lying on your back to sitting on the side of a flat bed without using bedrails: A lot  Moving to and from bed to chair (including a wheelchair): A lot  Standing up from a chair using your arms (e.g. wheelchair or bedside chair): A lot  To walk in hospital room: Total  Climbing 3-5 steps with railing: Total  Basic Mobility - Total Score: 10  Goals:  Encounter Problems       Encounter Problems (Active)       PT Problem       Pt will demonstrate min A  with bed mobility to edge of bed.         Start:  12/20/24    Expected End:  01/03/25            Pt will demonstrate min A  with sit to stand/chair transfers with FWW.         Start:  12/20/24    Expected End:  01/03/25            Pt will ambulate 15 feet  with FWW min A   10 % wt bearing L LE .         Start:  12/20/24    Expected End:  01/03/25            Pt to demo improved BLE strength by being able to complete supine/seated thera ex 2x20 BLEs with 4 or less rest breaks .         Start:  12/20/24    Expected End:  01/03/25               Pain - Adult            Education Documentation  Precautions, taught by Jose Cooper, PT at 12/20/2024  3:14 PM.  Learner: Patient  Readiness: Acceptance  Method: Explanation  Response: Verbalizes Understanding    Mobility Training, taught by Jose Cooper, PT at 12/20/2024  3:14 PM.  Learner: Patient  Readiness: Acceptance  Method: Explanation  Response: Verbalizes Understanding    Education Comments  No comments found.

## 2024-12-20 NOTE — CARE PLAN
The patient's goals for the shift include pain at a tolerable level    The clinical goals for the shift include Patient will remain safe and free from falls through end of shift    Over the shift, the patient did not make progress toward the following goals: none. Barriers to progression include n/a. Recommendations to address these barriers include continue current plan of care.

## 2024-12-20 NOTE — PROGRESS NOTES
"Halie Martinez is a 64 y.o. female on day 2 of admission presenting with Closed displaced transverse fracture of shaft of left femur, initial encounter.    Subjective   Patient seen and examined.  She is status post surgery, postop day 1, pain is under control denies any shortness of breath, no fevers,  not feeling cold anymore.     Objective     Physical Exam  Constitutional:       Comments: Pale appearing   HENT:      Head: Normocephalic and atraumatic.      Mouth/Throat:      Mouth: Mucous membranes are dry.   Eyes:      Extraocular Movements: Extraocular movements intact.   Cardiovascular:      Rate and Rhythm: Normal rate and regular rhythm.   Abdominal:      General: There is no distension.      Palpations: Abdomen is soft.   Musculoskeletal:         General: No swelling.      Cervical back: No rigidity.   Neurological:      General: No focal deficit present.      Mental Status: She is alert.   Psychiatric:         Mood and Affect: Mood normal.         Last Recorded Vitals  Blood pressure 130/71, pulse 70, temperature 35.9 °C (96.6 °F), resp. rate 18, height 1.651 m (5' 5\"), weight 84.4 kg (186 lb 1.1 oz), SpO2 98%.  Intake/Output last 3 Shifts:  I/O last 3 completed shifts:  In: 1731.7 (20.5 mL/kg) [I.V.:1531.7 (18.1 mL/kg); IV Piggyback:200]  Out: 1250 (14.8 mL/kg) [Urine:1150 (0.4 mL/kg/hr); Blood:100]  Weight: 84.4 kg     Relevant Results                              Assessment/Plan   Assessment & Plan  Closed displaced transverse fracture of shaft of left femur, initial encounter    HTN (hypertension)    Hyperlipidemia    Hypothyroidism    Osteoarthritis    Anemia    Status post left intramedullary nail fixation of femur fracture  Appreciate Ortho input  Continue pain control, supportive care  Mild leukocytosis and hemoglobin  drop  They are both to be expected after surgical stress will repeat tomorrow  She can resume diet  Resume home meds  DVT prophylaxis  PT OT             Ramez Washburn MD      "

## 2024-12-20 NOTE — PROGRESS NOTES
Occupational Therapy    Evaluation    Patient Name: Halie Martinez  MRN: [id number]  [place] [facility] [place] [facility]  Today's Date: 12/20/2024  Time Calculation  Start Time: 0939  Stop Time: 0959  Time Calculation (min): 20 min        Assessment:  OT Assessment: Impaired endurance, balance, generalized strength and safety with transfers and ADLs. would benefit from continued OT to address ADLs and functional transfers.  Prognosis: Good  Barriers to Discharge Home: Caregiver assistance (partial WB; stairs in home)  Caregiver Assistance: Patient lives alone and/or does not have reliable caregiver assistance  End of Session Communication: Bedside nurse  End of Session Patient Position: Up in chair, Alarm on  OT Assessment Results: Decreased ADL status, Decreased safe judgment during ADL, Decreased endurance, Decreased functional mobility, Decreased IADLs, Decreased trunk control for functional activities  Prognosis: Good  Plan:  Treatment Interventions: ADL retraining, Functional transfer training, Endurance training  OT Frequency: 3 times per week  OT Discharge Recommendations: High intensity level of continued care  OT Recommended Transfer Status: Moderate assist  OT - OK to Discharge: Yes (when medically stable/cleared)    Subjective   Current Problem:  1. Closed displaced transverse fracture of shaft of left femur, initial encounter  Case Request Operating Room: Insertion Intramedullary Nail Femur    Case Request Operating Room: Insertion Intramedullary Nail Femur        General:  General  Reason for Referral: ADL impairment  Referred By: Rani OT/PT 12/20  Past Medical History Relevant to Rehab: HTN, arthritis, L foot fx, back fusion, balance disorder, drop foot (states she wears AFOs on B ankles)  Family/Caregiver Present: No  Co-Treatment: PT  Co-Treatment Reason: to maximize pt. safety  Prior to Session Communication: Bedside nurse  Patient Position Received: Bed, 3 rail up, Alarm on  General Comment: Pt.  is 65 y/o female to ED 12/18 s/p fall at home. XR femur (+) Left transverse fx of proximal femoral diaphysis. Pt. underwent ORIF L  femur with Dr. Clarke 12/19.  Precautions:  LE Weight Bearing Status: Left Partial Weight Bearing (10%)  Medical Precautions: Fall precautions    Pain:  Pain Assessment  Pain Assessment: 0-10  0-10 (Numeric) Pain Score: 8  Pain Type: Acute pain  Pain Location: Leg  Pain Orientation: Left    Objective   Cognition:  Overall Cognitive Status: Within Functional Limits  Orientation Level: Oriented X4     Home Living:  Home Living Comments: Pt. lives alone in one story house. Has 3 steps up to kitchen level. Tub shower on main floor (pt. states this shower is not able to be used). Has walk in shower in basement level. Seat won't fiit in basement shower. HRs. on basement stairs.  Prior Function:  Prior Function Comments: Pt .states she is independent with ADLs/IADLs PTA. Has assistance for outside yard work. one fall. Drives. owns [employer] and cane    ADL:  Eating Assistance: Independent  Grooming Assistance: Stand by  Bathing Assistance: Moderate  UE Dressing Assistance: Stand by  LE Dressing Assistance: Maximal  Toileting Assistance with Device: Maximal  Activity Tolerance:  Endurance: Decreased tolerance for upright activites  Activity Tolerance Comments: pain limiting  Bed Mobility/Transfers: Bed Mobility  Bed Mobility: Yes  Bed Mobility 1  Bed Mobility 1: Supine to sitting  Level of Assistance 1: Moderate assistance  Bed Mobility Comments 1: Assist to bring LEs to edge of bed and hips to edge. Assit lifting trunk to upright sit; head of bed elevated; use of bed rails for UE support.    Transfers  Transfer: Yes  Transfer 1  Technique 1: Sit to stand  Transfer Device 1: Walker  Transfer Level of Assistance 1: Moderate assistance  Trials/Comments 1: x2 assist; VCs throughout to maintian partial WB LLE. Pt. requires assist to lift, weight shift forwrd and balance over walker.  Transfers  2  Technique 2: Stand pivot  Transfer Device 2: Walker  Transfer Level of Assistance 2: Moderate assistance  Trials/Comments 2: x2 assist to navigate/manage walker, balance, steady, and pivot to chair. Pt. with increased difficulty full WB on RLE as well as maintianing LLE partial WB. Continued VCs to adhere to precaution    Functional Mobility:  Functional Mobility  Functional Mobility Performed: No  Sitting Balance:  Static Sitting Balance  Static Sitting-Level of Assistance:  (heavy UE assist to balance.)  Standing Balance:  Static Standing Balance  Static Standing-Level of Assistance: Moderate assistance  Dynamic Standing Balance  Dynamic Standing-Comments: Poor     Strength:  Strength Comments: Justice 4/5 MMT    Hand Function:  Gross Grasp: Functional  Extremities: RUE   RUE : Within Functional Limits and LUE   LUE: Within Functional Limits    Outcome Measures:[facility] Daily Activity  Putting on and taking off regular lower body clothing: A lot  Bathing (including washing, rinsing, drying): A lot  Putting on and taking off regular upper body clothing: A little  Toileting, which includes using toilet, bedpan or urinal: A lot  Taking care of personal grooming such as brushing teeth: A little  Eating Meals: None  Daily Activity - Total Score: 16    Education Documentation  Body Mechanics, taught by Alcira Rehman OT at 12/20/2024  1:58 PM.  Learner: Patient  Readiness: Acceptance  Method: Explanation  Response: Verbalizes Understanding, Needs Reinforcement    Precautions, taught by Alcira Rehman OT at 12/20/2024  1:58 PM.  Learner: Patient  Readiness: Acceptance  Method: Explanation  Response: Verbalizes Understanding, Needs Reinforcement    ADL Training, taught by Alcira Rehman OT at 12/20/2024  1:58 PM.  Learner: Patient  Readiness: Acceptance  Method: Explanation  Response: Verbalizes Understanding, Needs Reinforcement    IP EDUCATION:  Education  Individual(s) Educated:  Patient  Education Provided: Fall precautons, Risk and benefits of OT discussed with patient or other, POC discussed and agreed upon, Diagnosis & Precautions    Goals:  Encounter Problems       Encounter Problems (Active)       OT Goals       SBA for all functional transfers  (Progressing)       Start:  12/20/24    Expected End:  01/03/25            SBA for LB dressing with use of AE  (Progressing)       Start:  12/20/24    Expected End:  01/03/25            SBA for toileting tasks and clothing mgmt  (Progressing)       Start:  12/20/24    Expected End:  01/03/25            Fair + dyn standing balance during ADLs/functional activities while maintaining partial WB (10%) LLE  (Progressing)       Start:  12/20/24    Expected End:  01/03/25

## 2024-12-20 NOTE — PROGRESS NOTES
12/20/24 1354   Patient Choice   Provider Choice list and CMS website (https://medicare.gov/care-compare#search) for post-acute Quality and Resource Measure Data were provided and reviewed with: Patient     SNF list provided from [facility]. She will review/ patient confirmed [employer] insurance. Questions answered/ await SNF preference. DEMETRIUS Storm

## 2024-12-20 NOTE — PROGRESS NOTES
Writer following up with patient for discharge planing. She had ortho surgery yesterday afternoon. She reports that the pain is much better. She did work with therapy and is now thinking will need SNF. List to be provided from [employer]/ she will be a pre-cert. Awaiting PT/OT evaluations. DEMETRIUS Storm

## 2024-12-20 NOTE — DISCHARGE INSTRUCTIONS
Trochanteric Fixation Nail/Intramedullary Nail for Intertrochanteric Fracture   Discharge Instructions    To prevent Clot formation, you have been placed on the following medication:  Xarelto 10 mg daily for 28 days. Started on 12/20/24     Surgical Site Care:  Change dressing once a day and PRN (as needed). Apply 4 x 4 sponge and light tape. If glue present, leave open to air.  You may leave wound open to air after initial dressing removal, if wound is clean, dry and intact  If Aquacel Ag dressing is present, do not remove dressing for 7 days, unless heavily saturated. If heavily saturated, remove dressing and start using instructions above  Staples will be removed on post-operative day 14 and steri-strips applied  Showering is permitted starting POD1 if waterproof Aquacel dressing is present or when the incision is covered with 4 x 4 and Tegaderm waterproof dressing  Until all areas of incision are healed.    Physical Therapy:  Weight Bearing Status:  10% weight bearing to operative extremity   No Hip precautions, per therapy handout     Pain Medications  You were given  Oxycodone  Wean off pain medications as you deem appropriate as long as pain is under control  Do not exceed 4,000 mg of acetaminophen from all sources in a 24 hour period    Cold packs/Ice packs/Machine  May be used 5 times daily for 15-30 minutes as necessary  Be sure to have a barrier (cloth, clothing, towel) between the site and the ice pack to prevent frostbite    Contact [facility] office if  Increased redness, swelling, drainage of any kind, and/or pain to surgery site.  As well as new onset fevers and or chills.  These could signify an infection.  Calf or thigh tenderness to touch as well as increased swelling or redness.  This could signify a clot formation.  Numbness or tingling to an area around the incision site or below the incision site (toes).  Any rash appears, increased  or new onset nausea/vomiting occur.  This may  indicate a reaction to a medication.  Phone # [phone].  Follow up with Surgeon in 2 weeks  I acknowledge that I have received sindi hose and understand the instructions on how and when to wear them (on during the day, off at night)   Discharging RN who has gone over instructions and acknowledges sindi hose have been received     Ice 5 times a day for 20 minutes each session to operative extremity for two weeks.   SINDI hose to be worn for three weeks. Can be removed for skin care and hygiene.   Incentive spirometer 10 times every hour while awake for two weeks.

## 2024-12-20 NOTE — PROGRESS NOTES
Followed up with pt & pt chose [facility] as FOC. Referral sent in [facility].   407pm Updated pt , notified pt accepted at [facility]. Requested team start precert.

## 2024-12-21 LAB
ABO GROUP (TYPE) IN BLOOD: NORMAL
ABO GROUP (TYPE) IN BLOOD: NORMAL
ANION GAP SERPL CALC-SCNC: 10 MMOL/L (ref 10–20)
ANTIBODY SCREEN: NORMAL
BLOOD EXPIRATION DATE: NORMAL
BUN SERPL-MCNC: 14 MG/DL (ref 6–23)
CALCIUM SERPL-MCNC: 9.9 MG/DL (ref 8.6–10.3)
CHLORIDE SERPL-SCNC: 103 MMOL/L (ref 98–107)
CO2 SERPL-SCNC: 28 MMOL/L (ref 21–32)
CREAT SERPL-MCNC: 0.99 MG/DL (ref 0.5–1.05)
DISPENSE STATUS: NORMAL
EGFRCR SERPLBLD CKD-EPI 2021: 63 ML/MIN/1.73M*2
ERYTHROCYTE [DISTWIDTH] IN BLOOD BY AUTOMATED COUNT: 15.9 % (ref 11.5–14.5)
GLUCOSE SERPL-MCNC: 96 MG/DL (ref 74–99)
HCT VFR BLD AUTO: 22.6 % (ref 36–46)
HGB BLD-MCNC: 7.4 G/DL (ref 12–16)
MCH RBC QN AUTO: 30.6 PG (ref 26–34)
MCHC RBC AUTO-ENTMCNC: 32.7 G/DL (ref 32–36)
MCV RBC AUTO: 93 FL (ref 80–100)
NRBC BLD-RTO: 0 /100 WBCS (ref 0–0)
PLATELET # BLD AUTO: 250 X10*3/UL (ref 150–450)
POTASSIUM SERPL-SCNC: 4.2 MMOL/L (ref 3.5–5.3)
PRODUCT BLOOD TYPE: 9500
PRODUCT CODE: NORMAL
RBC # BLD AUTO: 2.42 X10*6/UL (ref 4–5.2)
RH FACTOR (ANTIGEN D): NORMAL
RH FACTOR (ANTIGEN D): NORMAL
SODIUM SERPL-SCNC: 137 MMOL/L (ref 136–145)
UNIT ABO: NORMAL
UNIT NUMBER: NORMAL
UNIT RH: NORMAL
UNIT VOLUME: 350
WBC # BLD AUTO: 10 X10*3/UL (ref 4.4–11.3)
XM INTEP: NORMAL

## 2024-12-21 PROCEDURE — 36430 TRANSFUSION BLD/BLD COMPNT: CPT

## 2024-12-21 PROCEDURE — 2500000001 HC RX 250 WO HCPCS SELF ADMINISTERED DRUGS (ALT 637 FOR MEDICARE OP): Performed by: ORTHOPAEDIC SURGERY

## 2024-12-21 PROCEDURE — 97535 SELF CARE MNGMENT TRAINING: CPT | Mod: GO,CO

## 2024-12-21 PROCEDURE — 86923 COMPATIBILITY TEST ELECTRIC: CPT

## 2024-12-21 PROCEDURE — 2500000002 HC RX 250 W HCPCS SELF ADMINISTERED DRUGS (ALT 637 FOR MEDICARE OP, ALT 636 FOR OP/ED): Performed by: ORTHOPAEDIC SURGERY

## 2024-12-21 PROCEDURE — 85027 COMPLETE CBC AUTOMATED: CPT

## 2024-12-21 PROCEDURE — P9040 RBC LEUKOREDUCED IRRADIATED: HCPCS

## 2024-12-21 PROCEDURE — 99232 SBSQ HOSP IP/OBS MODERATE 35: CPT | Performed by: STUDENT IN AN ORGANIZED HEALTH CARE EDUCATION/TRAINING PROGRAM

## 2024-12-21 PROCEDURE — 2500000001 HC RX 250 WO HCPCS SELF ADMINISTERED DRUGS (ALT 637 FOR MEDICARE OP)

## 2024-12-21 PROCEDURE — 86901 BLOOD TYPING SEROLOGIC RH(D): CPT | Performed by: STUDENT IN AN ORGANIZED HEALTH CARE EDUCATION/TRAINING PROGRAM

## 2024-12-21 PROCEDURE — 36415 COLL VENOUS BLD VENIPUNCTURE: CPT | Performed by: STUDENT IN AN ORGANIZED HEALTH CARE EDUCATION/TRAINING PROGRAM

## 2024-12-21 PROCEDURE — 99231 SBSQ HOSP IP/OBS SF/LOW 25: CPT

## 2024-12-21 PROCEDURE — 80048 BASIC METABOLIC PNL TOTAL CA: CPT

## 2024-12-21 PROCEDURE — 97530 THERAPEUTIC ACTIVITIES: CPT | Mod: GP,CQ

## 2024-12-21 PROCEDURE — 36415 COLL VENOUS BLD VENIPUNCTURE: CPT

## 2024-12-21 PROCEDURE — 1210000001 HC SEMI-PRIVATE ROOM DAILY

## 2024-12-21 PROCEDURE — 2500000001 HC RX 250 WO HCPCS SELF ADMINISTERED DRUGS (ALT 637 FOR MEDICARE OP): Performed by: STUDENT IN AN ORGANIZED HEALTH CARE EDUCATION/TRAINING PROGRAM

## 2024-12-21 RX ADMIN — ATORVASTATIN CALCIUM 40 MG: 20 TABLET, FILM COATED ORAL at 09:03

## 2024-12-21 RX ADMIN — PREGABALIN 100 MG: 50 CAPSULE ORAL at 09:03

## 2024-12-21 RX ADMIN — LEVOTHYROXINE SODIUM 25 MCG: 25 TABLET ORAL at 05:26

## 2024-12-21 RX ADMIN — OXYCODONE 10 MG: 5 TABLET ORAL at 10:14

## 2024-12-21 RX ADMIN — ACETAMINOPHEN 650 MG: 325 TABLET ORAL at 17:59

## 2024-12-21 RX ADMIN — ACETAMINOPHEN 650 MG: 325 TABLET ORAL at 05:26

## 2024-12-21 RX ADMIN — OXYCODONE 10 MG: 5 TABLET ORAL at 18:02

## 2024-12-21 RX ADMIN — BISACODYL 10 MG: 5 TABLET ORAL at 10:38

## 2024-12-21 RX ADMIN — LEFLUNOMIDE 20 MG: 20 TABLET, FILM COATED ORAL at 09:03

## 2024-12-21 RX ADMIN — OXYCODONE 10 MG: 5 TABLET ORAL at 05:26

## 2024-12-21 RX ADMIN — ATENOLOL 50 MG: 50 TABLET ORAL at 05:26

## 2024-12-21 RX ADMIN — RIVAROXABAN 10 MG: 10 TABLET, FILM COATED ORAL at 09:03

## 2024-12-21 ASSESSMENT — COGNITIVE AND FUNCTIONAL STATUS - GENERAL
HELP NEEDED FOR BATHING: A LOT
MOBILITY SCORE: 11
WALKING IN HOSPITAL ROOM: TOTAL
DAILY ACTIVITIY SCORE: 15
DRESSING REGULAR LOWER BODY CLOTHING: A LOT
MOVING FROM LYING ON BACK TO SITTING ON SIDE OF FLAT BED WITH BEDRAILS: A LOT
EATING MEALS: A LITTLE
TOILETING: A LOT
STANDING UP FROM CHAIR USING ARMS: A LOT
CLIMB 3 TO 5 STEPS WITH RAILING: TOTAL
DRESSING REGULAR UPPER BODY CLOTHING: A LITTLE
TURNING FROM BACK TO SIDE WHILE IN FLAT BAD: A LITTLE
MOVING TO AND FROM BED TO CHAIR: A LOT
PERSONAL GROOMING: A LITTLE

## 2024-12-21 ASSESSMENT — PAIN SCALES - GENERAL
PAINLEVEL_OUTOF10: 7
PAINLEVEL_OUTOF10: 8
PAINLEVEL_OUTOF10: 7

## 2024-12-21 ASSESSMENT — PAIN - FUNCTIONAL ASSESSMENT
PAIN_FUNCTIONAL_ASSESSMENT: 0-10
PAIN_FUNCTIONAL_ASSESSMENT: 0-10

## 2024-12-21 ASSESSMENT — ACTIVITIES OF DAILY LIVING (ADL): HOME_MANAGEMENT_TIME_ENTRY: 13

## 2024-12-21 NOTE — CARE PLAN
Problem: Pain - Adult  Goal: Verbalizes/displays adequate comfort level or baseline comfort level  Outcome: Progressing  Flowsheets (Taken 12/20/2024 1250 by Heather Dyson RN)  Verbalizes/displays adequate comfort level or baseline comfort level:   Encourage patient to monitor pain and request assistance   Assess pain using appropriate pain scale   Administer analgesics based on type and severity of pain and evaluate response   Implement non-pharmacological measures as appropriate and evaluate response   Consider cultural and social influences on pain and pain management   Notify Licensed Independent Practitioner if interventions unsuccessful or patient reports new pain     Problem: Safety - Adult  Goal: Free from fall injury  Outcome: Progressing  Flowsheets (Taken 12/20/2024 1250 by Heather Dyson RN)  Free from fall injury:   Instruct family/caregiver on patient safety   Based on caregiver fall risk screen, instruct family/caregiver to ask for assistance with transferring infant if caregiver noted to have fall risk factors     Problem: Discharge Planning  Goal: Discharge to home or other facility with appropriate resources  Outcome: Progressing  Flowsheets (Taken 12/20/2024 1250 by Heather Dyson RN)  Discharge to home or other facility with appropriate resources:   Identify barriers to discharge with patient and caregiver   Arrange for needed discharge resources and transportation as appropriate   Identify discharge learning needs (meds, wound care, etc)   Arrange for interpreters to assist at discharge as needed   Refer to discharge planning if patient needs post-hospital services based on physician order or complex needs related to functional status, cognitive ability or social support system     Problem: Chronic Conditions and Co-morbidities  Goal: Patient's chronic conditions and co-morbidity symptoms are monitored and maintained or improved  Outcome: Progressing  Flowsheets (Taken 12/20/2024 1250 by Heather  Kiel, RN)  Care Plan - Patient's Chronic Conditions and Co-Morbidity Symptoms are Monitored and Maintained or Improved:   Monitor and assess patient's chronic conditions and comorbid symptoms for stability, deterioration, or improvement   Collaborate with multidisciplinary team to address chronic and comorbid conditions and prevent exacerbation or deterioration   Update acute care plan with appropriate goals if chronic or comorbid symptoms are exacerbated and prevent overall improvement and discharge     Problem: Skin  Goal: Decreased wound size/increased tissue granulation at next dressing change  Outcome: Progressing  Goal: Participates in plan/prevention/treatment measures  Outcome: Progressing  Goal: Prevent/manage excess moisture  Outcome: Progressing  Goal: Prevent/minimize sheer/friction injuries  Outcome: Progressing  Goal: Promote/optimize nutrition  Outcome: Progressing  Goal: Promote skin healing  Outcome: Progressing     Problem: Fall/Injury  Goal: Not fall by end of shift  Outcome: Progressing  Goal: Be free from injury by end of the shift  Outcome: Progressing  Goal: Verbalize understanding of personal risk factors for fall in the hospital  Outcome: Progressing  Goal: Verbalize understanding of risk factor reduction measures to prevent injury from fall in the home  Outcome: Progressing  Goal: Use assistive devices by end of the shift  Outcome: Progressing  Goal: Pace activities to prevent fatigue by end of the shift  Outcome: Progressing     Problem: Pain  Goal: Takes deep breaths with improved pain control throughout the shift  Outcome: Progressing  Goal: Turns in bed with improved pain control throughout the shift  Outcome: Progressing  Goal: Walks with improved pain control throughout the shift  Outcome: Progressing  Goal: Performs ADL's with improved pain control throughout shift  Outcome: Progressing  Goal: Participates in PT with improved pain control throughout the shift  Outcome:  Progressing  Goal: Free from opioid side effects throughout the shift  Outcome: Progressing  Goal: Free from acute confusion related to pain meds throughout the shift  Outcome: Progressing   The patient's goals for the shift include Pain control and rest    The clinical goals for the shift include patient will maintain a tolerable pain level less than or equal to 4/10

## 2024-12-21 NOTE — PROGRESS NOTES
"Occupational Therapy    OT Treatment    Patient Name: Halie Martinez  MRN: [id number]  Department: [facility]  Room: [address] [place] [address]  Today's Date: 12/21/2024  Time Calculation  Start Time: 0902  Stop Time: 0927  Time Calculation (min): 25 min        Assessment:  End of Session Communication: Bedside nurse, PCT/NA/CTA  End of Session Patient Position: Up in chair, Alarm on (all needs met, call light within reach)     Plan:  Treatment Interventions: ADL retraining, Functional transfer training, Endurance training  OT Frequency: 3 times per week  OT Discharge Recommendations: High intensity level of continued care  OT Recommended Transfer Status: Moderate assist  OT - OK to Discharge: Yes (when medically stable/cleared)  Treatment Interventions: ADL retraining, Functional transfer training, Endurance training    Subjective   Previous Visit Info:  OT Last Visit  OT Received On: 12/21/24  General:  General  Past Medical History Relevant to Rehab: HTN, arthritis, L foot fx, back fusion, balance disorder, drop foot (states she wears AFOs on B ankles)  Co-Treatment: PT  Co-Treatment Reason: to maximize pt. safety  Prior to Session Communication: Bedside nurse, PCT/NA/CTA  Patient Position Received: Bed, 3 rail up, Alarm on  General Comment: Pt agreeable to participate with encouragement  Precautions:  LE Weight Bearing Status: Left Partial Weight Bearing (10%)  Medical Precautions: Fall precautions  Precautions Comment: Pt able to maintain PWB restrictions for ~75% of standing time, cues and assist to maintain with increased fatigue and prolonged standing              Pain:  Pain Assessment  Pain Assessment: 0-10  0-10 (Numeric) Pain Score:  (unrated L LE pain.  When asked, pt responded \" I don't know, it hurts\".)  Pain Type: Acute pain, Surgical pain    Objective    Cognition:  Cognition  Overall Cognitive Status: Within Functional Limits  Orientation Level: Oriented X4       Activities of Daily Living: LE Dressing  LE Dressing: " Yes  LE Dressing Adaptive Equipment: Reacher (educated pt on use of reacher, cues for sequencing and technique provided during task)  Pants Level of Assistance: Moderate assistance  LE Dressing Where Assessed: Edge of bed  LE Dressing Comments: Assist to thread LLE, assist to pull clothing up once in stance d/t pt unable to maintain PWB with only 1 UE support.       Bed Mobility/Transfers: Bed Mobility 1  Bed Mobility 1: Supine to sitting  Level of Assistance 1: Minimum assistance  Bed Mobility Comments 1: HOB elevated ~25°; cues and increased time needed to complete.  Encouragement and empathy provided which helps motivate pt.    Transfer 1  Technique 1: Sit to stand, Stand to sit  Transfer Device 1: Gait belt (fww)  Transfer Level of Assistance 1: Moderate assistance, +2  Trials/Comments 1: bed to chair transfer with max assist x 2.  Cues for sequencing and technique.  Assist for ww management and maintaining PWB restrictions. (Pt reports frequent falling in home and having afo for drop foot but does not use in the house because she does not wear shoes in her house since getting new carpet.  Suggested that pt buys inside only shoes once LLE has healed so that she can wear afo.)      Therapy/Activity: Balance/Neuromuscular Re-Education  Balance/Neuromuscular Re-Education Activity Performed:  (Stand tolerance with b/l UE support x 2 min)      Outcome Measures:[facility] Daily Activity  Putting on and taking off regular lower body clothing: A lot  Bathing (including washing, rinsing, drying): A lot  Putting on and taking off regular upper body clothing: A little  Toileting, which includes using toilet, bedpan or urinal: A lot  Taking care of personal grooming such as brushing teeth: A little  Eating Meals: A little  Daily Activity - Total Score: 15        Education Documentation  Precautions, taught by Jennifer L Felty, OTA at 12/21/2024 12:39 PM.  Learner: Patient  Readiness: Acceptance  Method: Explanation,  Demonstration  Response: Needs Reinforcement     EDUCATION:  Education  Individual(s) Educated: Patient  Education Provided: Diagnosis & Precautions, Edema control, Symptom management, Ergonomics and postural realignment, Joint protection and energy conservation, Fall precautons, POC discussed and agreed upon  Education Comment: PWB restrictions    Goals:  Encounter Problems       Encounter Problems (Active)       OT Goals       SBA for all functional transfers  (Progressing)       Start:  12/20/24    Expected End:  01/03/25            SBA for LB dressing with use of AE  (Progressing)       Start:  12/20/24    Expected End:  01/03/25            SBA for toileting tasks and clothing mgmt  (Progressing)       Start:  12/20/24    Expected End:  01/03/25            Fair + dyn standing balance during ADLs/functional activities while maintaining partial WB (10%) LLE  (Progressing)       Start:  12/20/24    Expected End:  01/03/25

## 2024-12-21 NOTE — PROGRESS NOTES
"Physical Therapy    Physical Therapy Treatment    Patient Name: Halie Martinez  MRN: [id number]  Today's Date: 12/21/2024  Time Calculation  Start Time: 0901  Stop Time: 0927  Time Calculation (min): 26 min     1108/1108-A    Assessment/Plan   End of Session Communication: Bedside nurse  Assessment Comment: Patient presents with fair effort throughout todays session. Encouragement required for engagement during session, improving ability to maintain WBing during transfers. Poor endurance and fatigue limiting gait training this date. Call light and all needs in reach.  End of Session Patient Position: Up in chair, Alarm on          General Visit Information:   PT  Visit  PT Received On: 12/21/24  General  Co-Treatment: OT  Co-Treatment Reason: Due to pt's significant debility for optimal safety and therapy session  Prior to Session Communication: Bedside nurse  Patient Position Received: Bed, 3 rail up, Alarm on  General Comment: Pt agreeable to therapy this date.  Subjective     Precautions:  Precautions  LE Weight Bearing Status: Left Partial Weight Bearing (10% WBing)  Medical Precautions: Fall precautions  Precautions Comment: Pt required TC for therapist foot for maintaining WBing throughout, poor ability to maintain towards end of session due to fatigue.       Objective     Pain:  Pain Assessment  Pain Assessment: 0-10  0-10 (Numeric) Pain Score:  (not rated, when asked for rating pt states \"I don't know it hurts a lot\".)  Pain Type: Acute pain, Surgical pain  Pain Location: Hip  Pain Orientation: Left    Cognition:  Cognition  Overall Cognitive Status: Impaired  Orientation Level: Oriented X4  Safety Judgment: Decreased awareness of need for safety precautions  Problem Solving: Assistance required to implement solutions  Insight: Mild  Processing Speed: Within funtional limits      Treatments:           Bed Mobility  Bed Mobility: Yes  Bed Mobility 1  Bed Mobility 1: Supine to sitting  Level of Assistance 1: " Minimum assistance  Bed Mobility Comments 1: Pt performed supine<>EOB [place] with HOB elevated ~25 degrees, VC/TC for use of bedrails to lift trunk. Pt demonstrates ability to partially walk BLE off edge of bed, required assist for4 LLE.     Transfers  Transfer: Yes  Transfer 1  Transfer From 1: Bed to  Transfer to 1: Chair with arms  Technique 1: Stand pivot  Transfer Device 1: Walker, Gait belt (FWW)  Transfer Level of Assistance 1: Maximum assistance, +2, Maximum verbal cues  Trials/Comments 1: Pt performed stand pivot from EOB<>chair MaxA x2. Pt requires max VC for sequencing, fair eccentric control to sitting position. Fair AD management throughout. VC for maintaining WBing throughout, towards end of pivot pt unable to maintain.  Transfers 2  Transfer From 2: Bed to  Transfer to 2: Stand  Technique 2: Stand to sit, Sit to stand  Transfer Device 2: Walker, Gait belt (FWW)  Transfer Level of Assistance 2: Moderate assistance, +2, Maximum verbal cues  Trials/Comments 2: Pt performed STS from EOB<>FWW ModA x2. Max VC required for sequencing/hand placement. Therapist TC required to maintain WBing.          Outcome Measures:     [facility] Basic Mobility  Turning from your back to your side while in a flat bed without using bedrails: A lot  Moving from lying on your back to sitting on the side of a flat bed without using bedrails: A little  Moving to and from bed to chair (including a wheelchair): A lot  Standing up from a chair using your arms (e.g. wheelchair or bedside chair): A lot  To walk in hospital room: Total  Climbing 3-5 steps with railing: Total  Basic Mobility - Total Score: 11                                      Education Documentation  Precautions, taught by Leila Jaramillo PTA at 12/21/2024  1:21 PM.  Learner: Patient  Readiness: Acceptance  Method: Explanation, Demonstration  Response: Verbalizes Understanding, Needs Reinforcement    Mobility Training, taught by Leila Jaramillo PTA at 12/21/2024  1:21  PM.  Learner: Patient  Readiness: Acceptance  Method: Explanation, Demonstration  Response: Verbalizes Understanding, Needs Reinforcement    Education Comments  No comments found.           EDUCATION:  Outpatient Education  Education Comment: Sigjnificant amount of time spent education pt on benefits of therapy and WBing status, proper sequencing for transfers.  Encounter Problems       Encounter Problems (Active)       PT Problem       Pt will demonstrate min A  with bed mobility to edge of bed.   (Progressing)       Start:  12/20/24    Expected End:  01/03/25            Pt will demonstrate min A  with sit to stand/chair transfers with FWW.   (Progressing)       Start:  12/20/24    Expected End:  01/03/25            Pt will ambulate 15 feet with FWW min A   10 % wt bearing L LE .   (Progressing)       Start:  12/20/24    Expected End:  01/03/25            Pt to demo improved BLE strength by being able to complete supine/seated thera ex 2x20 BLEs with 4 or less rest breaks .   (Not Progressing)       Start:  12/20/24    Expected End:  01/03/25               Pain - Adult

## 2024-12-21 NOTE — CARE PLAN
The patient's goals for the shift include Pain control and rest    The clinical goals for the shift include Patient will remain free from fall/injury through end of shift      Problem: Pain - Adult  Goal: Verbalizes/displays adequate comfort level or baseline comfort level  Outcome: Progressing     Problem: Safety - Adult  Goal: Free from fall injury  Outcome: Progressing     Problem: Discharge Planning  Goal: Discharge to home or other facility with appropriate resources  Outcome: Progressing     Problem: Chronic Conditions and Co-morbidities  Goal: Patient's chronic conditions and co-morbidity symptoms are monitored and maintained or improved  Outcome: Progressing     Problem: Skin  Goal: Decreased wound size/increased tissue granulation at next dressing change  Outcome: Progressing  Goal: Participates in plan/prevention/treatment measures  Outcome: Progressing  Goal: Prevent/manage excess moisture  Outcome: Progressing  Goal: Prevent/minimize sheer/friction injuries  Outcome: Progressing  Goal: Promote/optimize nutrition  Outcome: Progressing  Goal: Promote skin healing  Outcome: Progressing     Problem: Fall/Injury  Goal: Not fall by end of shift  Outcome: Progressing  Goal: Be free from injury by end of the shift  Outcome: Progressing  Goal: Verbalize understanding of personal risk factors for fall in the hospital  Outcome: Progressing  Goal: Verbalize understanding of risk factor reduction measures to prevent injury from fall in the home  Outcome: Progressing  Goal: Use assistive devices by end of the shift  Outcome: Progressing  Goal: Pace activities to prevent fatigue by end of the shift  Outcome: Progressing     Problem: Pain  Goal: Takes deep breaths with improved pain control throughout the shift  Outcome: Progressing  Goal: Turns in bed with improved pain control throughout the shift  Outcome: Progressing  Goal: Walks with improved pain control throughout the shift  Outcome: Progressing  Goal: Performs  ADL's with improved pain control throughout shift  Outcome: Progressing  Goal: Participates in PT with improved pain control throughout the shift  Outcome: Progressing  Goal: Free from opioid side effects throughout the shift  Outcome: Progressing  Goal: Free from acute confusion related to pain meds throughout the shift  Outcome: Progressing

## 2024-12-21 NOTE — PROGRESS NOTES
"Halie Martinez is a 64 y.o. female on day 3 of admission presenting with Closed displaced transverse fracture of shaft of left femur, initial encounter.    Subjective   Patient seen and examined.  Except for slight feeling of something in the back of her throat she is good no shortness of breath no nausea or vomiting, no fevers or chills, pain is under control.  Objective     Physical Exam  Constitutional:       Comments: Pale appearing   HENT:      Head: Normocephalic and atraumatic.      Mouth/Throat:      Mouth: Mucous membranes are dry.   Eyes:      Extraocular Movements: Extraocular movements intact.   Cardiovascular:      Rate and Rhythm: Normal rate and regular rhythm.   Abdominal:      General: There is no distension.      Palpations: Abdomen is soft.   Musculoskeletal:         General: No swelling.      Cervical back: No rigidity.   Neurological:      General: No focal deficit present.      Mental Status: She is alert.   Psychiatric:         Mood and Affect: Mood normal.         Last Recorded Vitals  Blood pressure 115/67, pulse 81, temperature 36.4 °C (97.5 °F), resp. rate 16, height 1.651 m (5' 5\"), weight 84.4 kg (186 lb 1.1 oz), SpO2 94%.  Intake/Output last 3 Shifts:  I/O last 3 completed shifts:  In: 1285 (15.2 mL/kg) [I.V.:1185 (14 mL/kg); IV Piggyback:100]  Out: 700 (8.3 mL/kg) [Urine:700 (0.2 mL/kg/hr)]  Weight: 84.4 kg     Relevant Results                              Assessment/Plan   Assessment & Plan  Closed displaced transverse fracture of shaft of left femur, initial encounter    HTN (hypertension)    Hyperlipidemia    Hypothyroidism    Osteoarthritis    Anemia      Hemoglobin  dropped to 7.4, will go ahead and transfuse her 1 unit of packed red blood cells  Status post left intramedullary nail fixation of femur fracture  Appreciate Ortho input  Continue pain control, supportive care  Leukocytosis has resolved  Continue diet  Resume home meds  DVT prophylaxis  PT TAN CASPER " MD Wu

## 2024-12-21 NOTE — PROGRESS NOTES
Hip surgery    Progress Note    Subjective:     Post-Operative Day: 2  Status Post left intramedullary nail fixation of femur fracture     Systemic or Specific Complaints: No Complaints    Objective:     Visit Vitals  /63   Pulse 72   Temp 36.6 °C (97.9 °F)   Resp 16       General: alert and oriented, in no acute distress, appears stated age, and cooperative   Wound: no erythema, no edema, no drainage, and Mepilex dressing remains clean, dry, and intact   Motion: Painful range of Motion   DVT Exam: No evidence of DVT seen on physical exam.  Negative Pratik's sign.  No significant calf/ankle edema.       NVI in lower extremity. left thigh swollen but soft to palpation. Patient reports baseline foot drop LLE. Good distal pulses bilaterally.      Data Review  Recent Results (from the past 24 hours)   CBC    Collection Time: 12/21/24  7:45 AM   Result Value Ref Range    WBC 10.0 4.4 - 11.3 x10*3/uL    nRBC 0.0 0.0 - 0.0 /100 WBCs    RBC 2.42 (L) 4.00 - 5.20 x10*6/uL    Hemoglobin 7.4 (L) 12.0 - 16.0 g/dL    Hematocrit 22.6 (L) 36.0 - 46.0 %    MCV 93 80 - 100 fL    MCH 30.6 26.0 - 34.0 pg    MCHC 32.7 32.0 - 36.0 g/dL    RDW 15.9 (H) 11.5 - 14.5 %    Platelets 250 150 - 450 x10*3/uL   Basic metabolic panel    Collection Time: 12/21/24  7:45 AM   Result Value Ref Range    Glucose 96 74 - 99 mg/dL    Sodium 137 136 - 145 mmol/L    Potassium 4.2 3.5 - 5.3 mmol/L    Chloride 103 98 - 107 mmol/L    Bicarbonate 28 21 - 32 mmol/L    Anion Gap 10 10 - 20 mmol/L    Urea Nitrogen 14 6 - 23 mg/dL    Creatinine 0.99 0.50 - 1.05 mg/dL    eGFR 63 >60 mL/min/1.73m*2    Calcium 9.9 8.6 - 10.3 mg/dL       Assessment:     Status Post left intramedullary nail fixation of femur fracture. Doing well postoperatively. No acute events overnight.    Acute postoperative pain: Reports mild to moderate pain to operative extremity. Exacerbated by movement, relieved by rest ice and pain medication. Continue current pain management.      Hemoglobin this A.M. 7.4. continues to trend down. No signs of active bleeding. Extremity soft to palpation. Will give 1 unit PRBCs. Trend H/H.      Plan:      1. Continues current post-op course  2.  Continue Deep venous thrombosis prophylaxis: Xarelto 10 mg daily for 28 days  3.  Continue physical therapy: 10% Weightbearing with use of walker for assistance with ambulation  4.  Hgb 7.4. 1 unit PRBC ordered. Will trend H/H   5.  Continue Pain Control, script in chart  6.  Discharge planning: SNF vs HHC upon discharge, awaiting PT/OT evaluation and recs. SW and TCC following for discharge planning.     SUMMER Alexander-ZAHIDA   12/21/2024 10:33 AM

## 2024-12-22 VITALS
TEMPERATURE: 98.8 F | DIASTOLIC BLOOD PRESSURE: 88 MMHG | HEART RATE: 76 BPM | OXYGEN SATURATION: 94 % | WEIGHT: 186.07 LBS | BODY MASS INDEX: 31 KG/M2 | SYSTOLIC BLOOD PRESSURE: 152 MMHG | HEIGHT: 65 IN | RESPIRATION RATE: 18 BRPM

## 2024-12-22 LAB
ERYTHROCYTE [DISTWIDTH] IN BLOOD BY AUTOMATED COUNT: 18.4 % (ref 11.5–14.5)
HCT VFR BLD AUTO: 24.3 % (ref 36–46)
HGB BLD-MCNC: 8.1 G/DL (ref 12–16)
MCH RBC QN AUTO: 29.9 PG (ref 26–34)
MCHC RBC AUTO-ENTMCNC: 33.3 G/DL (ref 32–36)
MCV RBC AUTO: 90 FL (ref 80–100)
NRBC BLD-RTO: 0 /100 WBCS (ref 0–0)
PLATELET # BLD AUTO: 252 X10*3/UL (ref 150–450)
RBC # BLD AUTO: 2.71 X10*6/UL (ref 4–5.2)
WBC # BLD AUTO: 8.5 X10*3/UL (ref 4.4–11.3)

## 2024-12-22 PROCEDURE — 36415 COLL VENOUS BLD VENIPUNCTURE: CPT | Performed by: STUDENT IN AN ORGANIZED HEALTH CARE EDUCATION/TRAINING PROGRAM

## 2024-12-22 PROCEDURE — 97110 THERAPEUTIC EXERCISES: CPT | Mod: GP,CQ

## 2024-12-22 PROCEDURE — 99232 SBSQ HOSP IP/OBS MODERATE 35: CPT | Performed by: STUDENT IN AN ORGANIZED HEALTH CARE EDUCATION/TRAINING PROGRAM

## 2024-12-22 PROCEDURE — 1210000001 HC SEMI-PRIVATE ROOM DAILY

## 2024-12-22 PROCEDURE — 2500000002 HC RX 250 W HCPCS SELF ADMINISTERED DRUGS (ALT 637 FOR MEDICARE OP, ALT 636 FOR OP/ED): Performed by: ORTHOPAEDIC SURGERY

## 2024-12-22 PROCEDURE — 2500000001 HC RX 250 WO HCPCS SELF ADMINISTERED DRUGS (ALT 637 FOR MEDICARE OP): Performed by: STUDENT IN AN ORGANIZED HEALTH CARE EDUCATION/TRAINING PROGRAM

## 2024-12-22 PROCEDURE — 85027 COMPLETE CBC AUTOMATED: CPT | Performed by: STUDENT IN AN ORGANIZED HEALTH CARE EDUCATION/TRAINING PROGRAM

## 2024-12-22 PROCEDURE — 97530 THERAPEUTIC ACTIVITIES: CPT | Mod: GP,CQ

## 2024-12-22 PROCEDURE — 2500000001 HC RX 250 WO HCPCS SELF ADMINISTERED DRUGS (ALT 637 FOR MEDICARE OP)

## 2024-12-22 RX ADMIN — RIVAROXABAN 10 MG: 10 TABLET, FILM COATED ORAL at 09:13

## 2024-12-22 RX ADMIN — ATENOLOL 50 MG: 50 TABLET ORAL at 06:10

## 2024-12-22 RX ADMIN — OXYCODONE 10 MG: 5 TABLET ORAL at 11:03

## 2024-12-22 RX ADMIN — LEVOTHYROXINE SODIUM 25 MCG: 25 TABLET ORAL at 06:10

## 2024-12-22 RX ADMIN — ATORVASTATIN CALCIUM 40 MG: 20 TABLET, FILM COATED ORAL at 09:13

## 2024-12-22 RX ADMIN — PREGABALIN 100 MG: 50 CAPSULE ORAL at 09:13

## 2024-12-22 RX ADMIN — LEFLUNOMIDE 20 MG: 20 TABLET, FILM COATED ORAL at 09:13

## 2024-12-22 RX ADMIN — OXYCODONE 10 MG: 5 TABLET ORAL at 20:11

## 2024-12-22 RX ADMIN — OXYCODONE 10 MG: 5 TABLET ORAL at 06:10

## 2024-12-22 RX ADMIN — OXYCODONE 10 MG: 5 TABLET ORAL at 00:38

## 2024-12-22 ASSESSMENT — PAIN - FUNCTIONAL ASSESSMENT: PAIN_FUNCTIONAL_ASSESSMENT: 0-10

## 2024-12-22 ASSESSMENT — PAIN SCALES - GENERAL
PAINLEVEL_OUTOF10: 7
PAINLEVEL_OUTOF10: 7
PAINLEVEL_OUTOF10: 10 - WORST POSSIBLE PAIN
PAINLEVEL_OUTOF10: 6

## 2024-12-22 ASSESSMENT — COGNITIVE AND FUNCTIONAL STATUS - GENERAL
STANDING UP FROM CHAIR USING ARMS: A LITTLE
DAILY ACTIVITIY SCORE: 20
CLIMB 3 TO 5 STEPS WITH RAILING: TOTAL
DRESSING REGULAR LOWER BODY CLOTHING: A LOT
HELP NEEDED FOR BATHING: A LITTLE
MOVING TO AND FROM BED TO CHAIR: A LOT
MOBILITY SCORE: 12
MOVING TO AND FROM BED TO CHAIR: A LOT
STANDING UP FROM CHAIR USING ARMS: A LITTLE
MOBILITY SCORE: 15
WALKING IN HOSPITAL ROOM: TOTAL
MOVING FROM LYING ON BACK TO SITTING ON SIDE OF FLAT BED WITH BEDRAILS: A LITTLE
CLIMB 3 TO 5 STEPS WITH RAILING: TOTAL
TURNING FROM BACK TO SIDE WHILE IN FLAT BAD: A LOT
WALKING IN HOSPITAL ROOM: A LOT
MOVING FROM LYING ON BACK TO SITTING ON SIDE OF FLAT BED WITH BEDRAILS: A LITTLE
TOILETING: A LITTLE

## 2024-12-22 ASSESSMENT — PAIN DESCRIPTION - ORIENTATION
ORIENTATION: LEFT
ORIENTATION: LEFT

## 2024-12-22 ASSESSMENT — PAIN DESCRIPTION - LOCATION
LOCATION: LEG
LOCATION: HIP

## 2024-12-22 NOTE — PROGRESS NOTES
"Halie Martinez is a 64 y.o. female on day 4 of admission presenting with Closed displaced transverse fracture of shaft of left femur, initial encounter.    Subjective   Patient seen and examined.  Denies any shortness of breath, no hematuria, no dark stools, no fevers, pain in hip is under control.  Objective     Physical Exam  Constitutional:       Comments: Pale appearing   HENT:      Head: Normocephalic and atraumatic.      Mouth/Throat:      Mouth: Mucous membranes are dry.   Eyes:      Extraocular Movements: Extraocular movements intact.   Cardiovascular:      Rate and Rhythm: Normal rate and regular rhythm.   Abdominal:      General: There is no distension.      Palpations: Abdomen is soft.   Musculoskeletal:         General: No swelling.      Cervical back: No rigidity.   Neurological:      General: No focal deficit present.      Mental Status: She is alert.   Psychiatric:         Mood and Affect: Mood normal.         Last Recorded Vitals  Blood pressure 136/79, pulse 82, temperature 36.8 °C (98.2 °F), resp. rate 18, height 1.651 m (5' 5\"), weight 84.4 kg (186 lb 1.1 oz), SpO2 96%.  Intake/Output last 3 Shifts:  I/O last 3 completed shifts:  In: 350 (4.1 mL/kg) [Blood:350]  Out: - (0 mL/kg)   Weight: 84.4 kg     Relevant Results                              Assessment/Plan   Assessment & Plan  Closed displaced transverse fracture of shaft of left femur, initial encounter    HTN (hypertension)    Hyperlipidemia    Hypothyroidism    Osteoarthritis    Anemia      Status post transfusion of 1 unit of red Blood cells, hemoglobin went up to 8.1 from 7.4  Hemoglobin did not increase as much as I anticipated  No signs of active bleeding  Status post left intramedullary nail fixation of femur fracture, anemia is most likely secondary to surgical blood loss  Continue to monitor  Appreciate Ortho input  Continue pain control, supportive care  Leukocytosis has resolved  Continue diet  Resume home meds  DVT prophylaxis  PT " OT             Ramez Washburn MD

## 2024-12-22 NOTE — ASSESSMENT & PLAN NOTE
Status post transfusion of 1 unit of red Blood cells, hemoglobin went up to 8.1 from 7.4  Hemoglobin did not increase as much as I anticipated  No signs of active bleeding

## 2024-12-22 NOTE — PROGRESS NOTES
Physical Therapy    Physical Therapy Treatment    Patient Name: Halie Martinez  MRN: [id number]  Today's Date: 12/22/2024  Time Calculation  Start Time: 1158  Stop Time: 1225  Time Calculation (min): 27 min     1108/1108-A    Assessment/Plan   PT Assessment  PT Assessment Results: Decreased strength, Decreased endurance, Impaired balance, Decreased mobility, Decreased coordination, Pain, Orthopedic restrictions  Rehab Prognosis: Fair  Treatment Tolerance: Patient tolerated treatment well, Patient limited by fatigue, Patient limited by pain  Medical Staff Made Aware: Yes  Barriers to Participation: Comorbidities  End of Session Communication: Bedside nurse, PCT/NA/CTA  Assessment Comment: Patient continues to require (A) for safety with transfers/pre-gait activities. Patient will benefit from additional PT to address deficits and improve mobility.  End of Session Patient Position: Up in chair, Alarm on (Reclined in chair with LEs elevated; Call light, phone, and tray table within reach.)  PT Plan  Inpatient/Swing Bed or Outpatient: Inpatient  Treatment/Interventions: Bed mobility, Transfer training, Gait training, Stair training, Balance training, Strengthening, Endurance training, Therapeutic exercise, Therapeutic activity, Home exercise program, Range of motion, Positioning  PT Plan: Ongoing PT  PT Frequency: 4 times per week  PT Discharge Recommendations: Moderate intensity level of continued care    PT Recommended Transfer Status: Assist x1-2, Assistive device    General Visit Information:   PT  Visit  PT Received On: 12/22/24  General  Family/Caregiver Present: Yes  Prior to Session Communication: Bedside nurse, PCT/NA/CTA  Patient Position Received: Bed, 3 rail up, Alarm on  General Comment: Pleasant and cooperative.    General Observations:   General Observation: Alarm; Instructed patient in NWB (L)LE since 10%PWB is very difficult to maintain.    Subjective     Precautions:  Precautions  LE Weight Bearing  Status: Left Partial Weight Bearing (10% (L)LE)  Medical Precautions: Fall precautions  Precautions Comment: ORIF (L)femur(12/19/24---Analilia)    Objective     Pain:  Pain Assessment  Pain Assessment:  (7-8/10 (L)LE. Patient receptive to trying ice pack on (L)LE at end of session. Nursing aware of pain level.)    Cognition:  Cognition  Overall Cognitive Status: Within Functional Limits    Balance:   Static Sitting Balance  Static Sitting-Comment/Number of Minutes: G  Dynamic Sitting Balance  Dynamic Sitting-Comments: G-  Static Standing Balance  Static Standing-Comment/Number of Minutes: F-/P+ with ww  Dynamic Standing Balance  Dynamic Standing-Comments: P+ with ww    Treatments:  Therapeutic Exercise  Therapeutic Exercise Performed: Yes  Therapeutic Exercise Activity 1: Instructed patient in supine ther ex. AROM BLE with QS and AAROM BLE with [place]elslides/Abd-add x10. Foot drop (B); AFOs at home. Instructed patient to try exercises on her own outside therapy session; pt agreeable.           Ambulation/Gait Training  Ambulation/Gait Training Performed: Yes  Ambulation/Gait Training 1  Surface 1: Level tile  Device 1: Rolling walker  Gait Support Devices: Gait belt  Assistance 1: Moderate assistance  Comments/Distance (ft) 1: Attempted to amb 3x, but patient was unable to hop on (R)LE. Instructed patient in NWB (L)LE since 10%PWB is difficult to maintain. Tried with hospital gripper sock on (R)foot and then with house slipper; unable to clear the surface of the floor with attempts to hop. Educated patient on ideally performing pivot transfers towards (R)side for safety. Patient states her AFOs/shoes are at home; may benefit from wearing them for future transfers.  Transfers  Transfer: Yes  Transfer 1  Technique 1: Sit to stand, Stand to sit  Transfer Device 1: Gait belt (ww)  Transfer Level of Assistance 1: Minimum assistance  Trials/Comments 1: (3x). v/c for safe hand placement and technique. Slow transition of  hands to/from ww. Benefits from elevated surfaces. Does well with keeping (L)LE NWB.             Outcome Measures:     [facility] Basic Mobility  Turning from your back to your side while in a flat bed without using bedrails: A little  Moving from lying on your back to sitting on the side of a flat bed without using bedrails: A lot  Moving to and from bed to chair (including a wheelchair): A lot  Standing up from a chair using your arms (e.g. wheelchair or bedside chair): A little  To walk in hospital room: Total  Climbing 3-5 steps with railing: Total  Basic Mobility - Total Score: 12                                      Education Documentation  Precautions, taught by Ev Winchester PTA at 12/22/2024  1:27 PM.  Learner: Family, Patient  Readiness: Acceptance  Method: Explanation, Demonstration, Teach-back  Response: Verbalizes Understanding, Needs Reinforcement  Comment: See therapy note.    Mobility Training, taught by Ev Winchester PTA at 12/22/2024  1:27 PM.  Learner: Family, Patient  Readiness: Acceptance  Method: Explanation, Demonstration, Teach-back  Response: Verbalizes Understanding, Needs Reinforcement  Comment: See therapy note.         EDUCATION:  Individual(s) Educated: Patient, Significant Other  Education Provided: Body Mechanics, Fall Risk, Home Safety, Home Exercise Program, POC, Post-Op Precautions, Posture (Balance; Safety with transfers; Importance of maintaining (L)LE PWB 10%(instructed in NWB) for proper healing.)  Patient Response to Education: Patient/Caregiver Verbalized Understanding of Information, Patient/Caregiver Performed Return Demonstration of Exercises/Activities, Patient/Caregiver Asked Appropriate Questions  Education Comment: Sigjnificant amount of time spent education pt on benefits of therapy and WBing status, proper sequencing for transfers.    Encounter Problems       Encounter Problems (Active)       PT Problem       Pt will demonstrate min A  with bed mobility to edge of  bed.   (Progressing)       Start:  12/20/24    Expected End:  01/03/25            Pt will demonstrate min A  with sit to stand/chair transfers with FWW.   (Progressing)       Start:  12/20/24    Expected End:  01/03/25            Pt will ambulate 15 feet with FWW min A   10 % wt bearing L LE .   (Not Progressing)       Start:  12/20/24    Expected End:  01/03/25            Pt to demo improved BLE strength by being able to complete supine/seated thera ex 2x20 BLEs with 4 or less rest breaks .   (Progressing)       Start:  12/20/24    Expected End:  01/03/25

## 2024-12-22 NOTE — PROGRESS NOTES
12/22/24 1526   Discharge Planning   Home or Post Acute Services Post acute facilities (Rehab/SNF/etc)   Type of Post Acute Facility Services Rehab;Skilled nursing   Expected Discharge Disposition SNF   Does the patient need discharge transport arranged? Yes   RoundTrip coordination needed? Yes   Has discharge transport been arranged? No     Per Team, precert for [facility] remains pending at present time.

## 2024-12-22 NOTE — CARE PLAN
The patient's goals for the shift include Adequate rest    The clinical goals for the shift include Patient will remain free from fall/injury through end of shift      Problem: Pain - Adult  Goal: Verbalizes/displays adequate comfort level or baseline comfort level  Outcome: Progressing     Problem: Safety - Adult  Goal: Free from fall injury  Outcome: Progressing     Problem: Discharge Planning  Goal: Discharge to home or other facility with appropriate resources  Outcome: Progressing     Problem: Chronic Conditions and Co-morbidities  Goal: Patient's chronic conditions and co-morbidity symptoms are monitored and maintained or improved  Outcome: Progressing     Problem: Skin  Goal: Decreased wound size/increased tissue granulation at next dressing change  Outcome: Progressing  Goal: Participates in plan/prevention/treatment measures  Outcome: Progressing  Goal: Prevent/manage excess moisture  Outcome: Progressing  Goal: Prevent/minimize sheer/friction injuries  Outcome: Progressing  Goal: Promote/optimize nutrition  Outcome: Progressing  Goal: Promote skin healing  Outcome: Progressing     Problem: Fall/Injury  Goal: Not fall by end of shift  Outcome: Progressing  Goal: Be free from injury by end of the shift  Outcome: Progressing  Goal: Verbalize understanding of personal risk factors for fall in the hospital  Outcome: Progressing  Goal: Verbalize understanding of risk factor reduction measures to prevent injury from fall in the home  Outcome: Progressing  Goal: Use assistive devices by end of the shift  Outcome: Progressing  Goal: Pace activities to prevent fatigue by end of the shift  Outcome: Progressing     Problem: Pain  Goal: Takes deep breaths with improved pain control throughout the shift  Outcome: Progressing  Goal: Turns in bed with improved pain control throughout the shift  Outcome: Progressing  Goal: Walks with improved pain control throughout the shift  Outcome: Progressing  Goal: Performs ADL's  with improved pain control throughout shift  Outcome: Progressing  Goal: Participates in PT with improved pain control throughout the shift  Outcome: Progressing  Goal: Free from opioid side effects throughout the shift  Outcome: Progressing  Goal: Free from acute confusion related to pain meds throughout the shift  Outcome: Progressing

## 2024-12-22 NOTE — PROGRESS NOTES
Patient resting comfortably.  Postoperative day #3 status post left femoral nailing.      Dressing clean and dry.  Calf supple and nontender.      POD 3 status post left femoral nailing      10% partial weightbearing.  Discharge planning with plans for discharge to skilled nursing facility.  Continue current orthopedic care.

## 2024-12-23 VITALS
HEIGHT: 65 IN | OXYGEN SATURATION: 96 % | SYSTOLIC BLOOD PRESSURE: 162 MMHG | RESPIRATION RATE: 16 BRPM | HEART RATE: 75 BPM | BODY MASS INDEX: 31 KG/M2 | DIASTOLIC BLOOD PRESSURE: 78 MMHG | TEMPERATURE: 98.4 F | WEIGHT: 186.07 LBS

## 2024-12-23 PROBLEM — D64.9 ANEMIA: Status: RESOLVED | Noted: 2024-12-19 | Resolved: 2024-12-23

## 2024-12-23 PROBLEM — S72.322A CLOSED DISPLACED TRANSVERSE FRACTURE OF SHAFT OF LEFT FEMUR, INITIAL ENCOUNTER: Status: RESOLVED | Noted: 2024-12-18 | Resolved: 2024-12-23

## 2024-12-23 LAB
ANION GAP SERPL CALC-SCNC: 13 MMOL/L (ref 10–20)
BUN SERPL-MCNC: 11 MG/DL (ref 6–23)
CALCIUM SERPL-MCNC: 10.2 MG/DL (ref 8.6–10.3)
CHLORIDE SERPL-SCNC: 103 MMOL/L (ref 98–107)
CO2 SERPL-SCNC: 25 MMOL/L (ref 21–32)
CREAT SERPL-MCNC: 0.82 MG/DL (ref 0.5–1.05)
EGFRCR SERPLBLD CKD-EPI 2021: 79 ML/MIN/1.73M*2
ERYTHROCYTE [DISTWIDTH] IN BLOOD BY AUTOMATED COUNT: 17.9 % (ref 11.5–14.5)
GLUCOSE SERPL-MCNC: 88 MG/DL (ref 74–99)
HCT VFR BLD AUTO: 27.8 % (ref 36–46)
HGB BLD-MCNC: 9.3 G/DL (ref 12–16)
HOLD SPECIMEN: NORMAL
MCH RBC QN AUTO: 29.7 PG (ref 26–34)
MCHC RBC AUTO-ENTMCNC: 33.5 G/DL (ref 32–36)
MCV RBC AUTO: 89 FL (ref 80–100)
NRBC BLD-RTO: 0 /100 WBCS (ref 0–0)
PLATELET # BLD AUTO: 328 X10*3/UL (ref 150–450)
POTASSIUM SERPL-SCNC: 3.7 MMOL/L (ref 3.5–5.3)
RBC # BLD AUTO: 3.13 X10*6/UL (ref 4–5.2)
SODIUM SERPL-SCNC: 137 MMOL/L (ref 136–145)
WBC # BLD AUTO: 8.4 X10*3/UL (ref 4.4–11.3)

## 2024-12-23 PROCEDURE — 2500000001 HC RX 250 WO HCPCS SELF ADMINISTERED DRUGS (ALT 637 FOR MEDICARE OP): Performed by: STUDENT IN AN ORGANIZED HEALTH CARE EDUCATION/TRAINING PROGRAM

## 2024-12-23 PROCEDURE — 2500000001 HC RX 250 WO HCPCS SELF ADMINISTERED DRUGS (ALT 637 FOR MEDICARE OP): Performed by: ORTHOPAEDIC SURGERY

## 2024-12-23 PROCEDURE — 97535 SELF CARE MNGMENT TRAINING: CPT | Mod: GO,CO

## 2024-12-23 PROCEDURE — 99231 SBSQ HOSP IP/OBS SF/LOW 25: CPT

## 2024-12-23 PROCEDURE — 85027 COMPLETE CBC AUTOMATED: CPT | Performed by: STUDENT IN AN ORGANIZED HEALTH CARE EDUCATION/TRAINING PROGRAM

## 2024-12-23 PROCEDURE — 80048 BASIC METABOLIC PNL TOTAL CA: CPT | Performed by: STUDENT IN AN ORGANIZED HEALTH CARE EDUCATION/TRAINING PROGRAM

## 2024-12-23 PROCEDURE — 2500000001 HC RX 250 WO HCPCS SELF ADMINISTERED DRUGS (ALT 637 FOR MEDICARE OP)

## 2024-12-23 PROCEDURE — 99239 HOSP IP/OBS DSCHRG MGMT >30: CPT | Performed by: STUDENT IN AN ORGANIZED HEALTH CARE EDUCATION/TRAINING PROGRAM

## 2024-12-23 PROCEDURE — 36415 COLL VENOUS BLD VENIPUNCTURE: CPT | Performed by: STUDENT IN AN ORGANIZED HEALTH CARE EDUCATION/TRAINING PROGRAM

## 2024-12-23 PROCEDURE — 1210000001 HC SEMI-PRIVATE ROOM DAILY

## 2024-12-23 PROCEDURE — 97530 THERAPEUTIC ACTIVITIES: CPT | Mod: GP,CQ

## 2024-12-23 PROCEDURE — 2500000002 HC RX 250 W HCPCS SELF ADMINISTERED DRUGS (ALT 637 FOR MEDICARE OP, ALT 636 FOR OP/ED): Performed by: ORTHOPAEDIC SURGERY

## 2024-12-23 RX ADMIN — Medication 10 MG: at 00:06

## 2024-12-23 RX ADMIN — OXYCODONE 10 MG: 5 TABLET ORAL at 17:40

## 2024-12-23 RX ADMIN — LEVOTHYROXINE SODIUM 25 MCG: 25 TABLET ORAL at 06:43

## 2024-12-23 RX ADMIN — RIVAROXABAN 10 MG: 10 TABLET, FILM COATED ORAL at 08:38

## 2024-12-23 RX ADMIN — ACETAMINOPHEN 650 MG: 325 TABLET ORAL at 17:40

## 2024-12-23 RX ADMIN — ATENOLOL 50 MG: 50 TABLET ORAL at 06:43

## 2024-12-23 RX ADMIN — OXYCODONE 10 MG: 5 TABLET ORAL at 11:52

## 2024-12-23 RX ADMIN — CYCLOBENZAPRINE HYDROCHLORIDE 10 MG: 10 TABLET, FILM COATED ORAL at 00:06

## 2024-12-23 RX ADMIN — ACETAMINOPHEN 650 MG: 325 TABLET ORAL at 11:52

## 2024-12-23 RX ADMIN — PREGABALIN 100 MG: 50 CAPSULE ORAL at 08:38

## 2024-12-23 RX ADMIN — LEFLUNOMIDE 20 MG: 20 TABLET, FILM COATED ORAL at 08:38

## 2024-12-23 RX ADMIN — OXYCODONE 10 MG: 5 TABLET ORAL at 06:43

## 2024-12-23 RX ADMIN — ATORVASTATIN CALCIUM 40 MG: 20 TABLET, FILM COATED ORAL at 08:38

## 2024-12-23 ASSESSMENT — PAIN DESCRIPTION - ORIENTATION
ORIENTATION: LEFT

## 2024-12-23 ASSESSMENT — PAIN SCALES - GENERAL
PAINLEVEL_OUTOF10: 9
PAINLEVEL_OUTOF10: 5 - MODERATE PAIN
PAINLEVEL_OUTOF10: 7
PAINLEVEL_OUTOF10: 8
PAINLEVEL_OUTOF10: 3
PAINLEVEL_OUTOF10: 4
PAINLEVEL_OUTOF10: 6
PAINLEVEL_OUTOF10: 9
PAINLEVEL_OUTOF10: 8

## 2024-12-23 ASSESSMENT — COGNITIVE AND FUNCTIONAL STATUS - GENERAL
DAILY ACTIVITIY SCORE: 20
PERSONAL GROOMING: A LITTLE
MOBILITY SCORE: 15
STANDING UP FROM CHAIR USING ARMS: A LITTLE
WALKING IN HOSPITAL ROOM: TOTAL
MOVING TO AND FROM BED TO CHAIR: A LOT
TURNING FROM BACK TO SIDE WHILE IN FLAT BAD: A LITTLE
HELP NEEDED FOR BATHING: A LITTLE
MOVING FROM LYING ON BACK TO SITTING ON SIDE OF FLAT BED WITH BEDRAILS: A LITTLE
DRESSING REGULAR LOWER BODY CLOTHING: A LITTLE
MOVING FROM LYING ON BACK TO SITTING ON SIDE OF FLAT BED WITH BEDRAILS: A LITTLE
STANDING UP FROM CHAIR USING ARMS: A LITTLE
TOILETING: A LITTLE
DAILY ACTIVITIY SCORE: 19
CLIMB 3 TO 5 STEPS WITH RAILING: TOTAL
MOVING TO AND FROM BED TO CHAIR: A LOT
DAILY ACTIVITIY SCORE: 20
DRESSING REGULAR LOWER BODY CLOTHING: A LOT
MOBILITY SCORE: 15
HELP NEEDED FOR BATHING: A LITTLE
DRESSING REGULAR LOWER BODY CLOTHING: A LOT
MOVING FROM LYING ON BACK TO SITTING ON SIDE OF FLAT BED WITH BEDRAILS: A LITTLE
DRESSING REGULAR UPPER BODY CLOTHING: A LITTLE
MOBILITY SCORE: 13
TOILETING: A LITTLE
TOILETING: A LITTLE
WALKING IN HOSPITAL ROOM: A LOT
HELP NEEDED FOR BATHING: A LITTLE
WALKING IN HOSPITAL ROOM: A LOT
MOVING TO AND FROM BED TO CHAIR: A LOT
STANDING UP FROM CHAIR USING ARMS: A LITTLE

## 2024-12-23 ASSESSMENT — PAIN - FUNCTIONAL ASSESSMENT
PAIN_FUNCTIONAL_ASSESSMENT: 0-10

## 2024-12-23 ASSESSMENT — ACTIVITIES OF DAILY LIVING (ADL): HOME_MANAGEMENT_TIME_ENTRY: 17

## 2024-12-23 ASSESSMENT — PAIN DESCRIPTION - LOCATION
LOCATION: HIP

## 2024-12-23 ASSESSMENT — PAIN SCALES - PAIN ASSESSMENT IN ADVANCED DEMENTIA (PAINAD): TOTALSCORE: MEDICATION (SEE MAR)

## 2024-12-23 NOTE — PROGRESS NOTES
12/23/24 1656   Discharge Planning   Home or Post Acute Services Post acute facilities (Rehab/SNF/etc)   Type of Post Acute Facility Services Skilled nursing   Expected Discharge Disposition SNF  ([facility])   Does the patient need discharge transport arranged? Yes   RoundTrip coordination needed? Yes   Has discharge transport been arranged? Yes   What day is the transport expected? 12/23/24   What time is the transport expected? 1930     Insurance precert received and pt medically cleared for discharge. Pt updated and remains in agreement. Facility and care team updated.

## 2024-12-23 NOTE — PROGRESS NOTES
Occupational Therapy    OT Treatment    Patient Name: Halie Martinez  MRN: [id number]  Department: [facility]  Room: [address] [place], [place] [address]-  Today's Date: 12/23/2024  Time Calculation  Start Time: 1149  Stop Time: 1223  Time Calculation (min): 34 min     Assessment:  OT Assessment: Pt required max verbal cueing for safety during transfers and Min A to complete ADL's. Pt would benefit with continued OT services to address deficits.  End of Session Communication: Bedside nurse  End of Session Patient Position: Alarm on, Up in chair (call light and allneed within reach)     Plan:  Treatment Interventions: ADL retraining, Functional transfer training, Endurance training  OT Frequency: 3 times per week  OT Discharge Recommendations: High intensity level of continued care  OT Recommended Transfer Status: Moderate assist  OT - OK to Discharge: Yes (when medically stable/cleared)  Treatment Interventions: ADL retraining, Functional transfer training, Endurance training    Subjective   Previous Visit Info:  OT Last Visit  OT Received On: 12/23/24  General:  General  Co-Treatment: PT  Co-Treatment Reason: Due to pt's significant debility for optimal safety and therapy session  Prior to Session Communication: Bedside nurse  Patient Position Received: Bed, 3 rail up, Alarm on  General Comment:  (pt agreeable to therapy)  Precautions:  LE Weight Bearing Status: Left Partial Weight Bearing (10%)  Medical Precautions: Fall precautions  Precautions Comment:  (pt demo adherance to precautions with verbal cues)    Pain:  Pain Assessment  Pain Assessment: 0-10  0-10 (Numeric) Pain Score: 9  Pain Type: Surgical pain  Pain Location: Hip  Pain Orientation: Left  Pain Interventions: Repositioned    Objective    Cognition:  Cognition  Overall Cognitive Status: Within Functional Limits  Orientation Level: Oriented X4    Activities of Daily Living: UE Dressing  UE Dressing Level of Assistance: Setup  UE Dressing Where Assessed: Chair  UE Dressing Comments:   (pt able to marj hospital gown with set-up assist)    LE Dressing  LE Dressing Adaptive Equipment: Reacher  Pants Level of Assistance: Minimum assistance, Setup, Minimal verbal cues  LE Dressing Where Assessed: Edge of bed  LE Dressing Comments: pt  required Min A  to maintain balance. Min verbal and physical cues provided for techninque to thread pants when seated.    Bed Mobility/Transfers: Bed Mobility 1  Bed Mobility 1: Supine to sitting, Sitting to supine  Level of Assistance 1: Minimum assistance  Bed Mobility Comments 1: pt required Min A to EOB with HOB elevated ~45 degrees and use of bedrail    Transfer 1  Transfer From 1: Bed to  Transfer to 1: Chair with arms  Technique 1: Stand pivot, To left  Transfer Device 1: Walker, Gait belt  Transfer Level of Assistance 1: Moderate assistance  Trials/Comments 1: required Mod A for piviting, second person SB for safety. Required cues for safety techniques to maintain WB precaution  Transfers 2  Technique 2: Sit to stand, Stand to sit  Transfer Device 2: Walker, Gait belt  Transfer Level of Assistance 2: Minimum assistance, Maximum verbal cues  Trials/Comments 2: pt completes multiple trials with fww to increase balance and adhere to precaution. Max verbal cues provided for sequencing and proper hand placement.    Standing Balance:  Dynamic Standing Balance  Dynamic Standing-Level of Assistance: Minimum assistance  Dynamic Standing-Comments:  (demo'd fair to fair (-) with dyn standing balance during LB dressing task)    Outcome Measures:[facility] Daily Activity  Putting on and taking off regular lower body clothing: A little  Bathing (including washing, rinsing, drying): A little  Putting on and taking off regular upper body clothing: A little  Toileting, which includes using toilet, bedpan or urinal: A little  Taking care of personal grooming such as brushing teeth: A little  Eating Meals: None  Daily Activity - Total Score: 19    EDUCATION: pt able to carryover WB  precaution intermittenly throughout session however therapist reminded pt about PWB precautions.      Goals:  Encounter Problems       Encounter Problems (Active)       OT Goals       SBA for all functional transfers  (Progressing)       Start:  12/20/24    Expected End:  01/03/25            SBA for LB dressing with use of AE  (Progressing)       Start:  12/20/24    Expected End:  01/03/25            SBA for toileting tasks and clothing mgmt  (Progressing)       Start:  12/20/24    Expected End:  01/03/25            Fair + dyn standing balance during ADLs/functional activities while maintaining partial WB (10%) LLE  (Progressing)       Start:  12/20/24    Expected End:  01/03/25                              Clothing

## 2024-12-23 NOTE — CARE PLAN
The patient's goals for the shift include pain at a tolerable level    The clinical goals for the shift include patient will remain hemodynamically stable through end of shift    Over the shift, the patient did not make progress toward the following goals: none. Barriers to progression include n/a. Recommendations to address these barriers include continue current plan of care.

## 2024-12-23 NOTE — DISCHARGE SUMMARY
Discharge Diagnosis  Closed displaced transverse fracture of shaft of left femur, initial encounter    Issues Requiring Follow-Up      Discharge Meds     Medication List      START taking these medications     cyclobenzaprine 10 mg tablet; Commonly known as: Flexeril; Take 1 tablet   (10 mg) by mouth 3 times a day as needed for muscle spasms for up to 7   days.   oxyCODONE 5 mg immediate release tablet; Commonly known as: Roxicodone;   Take 1 tablet (5 mg) by mouth every 6 hours if needed for severe pain (7 -   10) for up to 7 days.   rivaroxaban 10 mg tablet; Commonly known as: Xarelto; Take 1 tablet (10   mg) by mouth once daily for 27 doses.     CONTINUE taking these medications     atenolol 50 mg tablet; Commonly known as: Tenormin   atorvastatin 40 mg tablet; Commonly known as: Lipitor   leflunomide 20 mg tablet; Commonly known as: Arava   levothyroxine 25 mcg tablet; Commonly known as: Synthroid, Levoxyl   pregabalin 100 mg capsule; Commonly known as: Lyrica   PreserVision AREDS 2 Plus  mcg-15 mcg- 5 mg-1 mg capsule; Generic   drug: mv-min-FA-vit K-lutein-zeaxant   vitamins A,C,E-zinc-copper 4,296 mcg-226 mg-90 mg capsule       Test Results Pending At Discharge  Pending Labs       No current pending labs.            Hospital Course   64-year-old female with past medical history of hypertension hyperlipidemia osteoarthritis hypothyroidism who was admitted after a closed displaced transverse fracture of left femur shaft.  Underwent nail pending, postop she has been doing well, required 1 unit of PRBC transfusion for drop in hemoglobin, hemoglobin has remained stable overnight in fact improved from 8.1-9.3, surgical site appears clean, no hematuria or dark stools, dizziness has resolved no shortness of breath, chest pain has been been working with physical therapy, has been started on Xarelto for DVT prophylaxis weightbearing instructions have been provided she will be discharged to SNF.    Pertinent  Physical Exam At Time of Discharge  Physical Exam  Constitutional:       Appearance: Normal appearance.   Eyes:      Extraocular Movements: Extraocular movements intact.      Pupils: Pupils are equal, round, and reactive to light.   Cardiovascular:      Rate and Rhythm: Normal rate.      Heart sounds: Murmur heard.   Pulmonary:      Effort: Pulmonary effort is normal.   Abdominal:      Palpations: Abdomen is soft.   Musculoskeletal:      Comments: Surgical site appears clean no swelling, hematoma or oozing   Neurological:      General: No focal deficit present.      Mental Status: She is alert.   Psychiatric:         Mood and Affect: Mood normal.         Outpatient Follow-Up  Future Appointments   Date Time Provider Department Center   1/2/2025  8:30 AM Óscar Clarke MD [id number] [place]         Ramez Washburn MD

## 2024-12-23 NOTE — PROGRESS NOTES
Hip surgery    Progress Note    Subjective:     Post-Operative Day: 4  Status Post left intramedullary nail fixation of femur fracture     Systemic or Specific Complaints: No Complaints    Objective:     Visit Vitals  /78 (BP Location: Right arm)   Pulse 78   Temp 36 °C (96.8 °F) (Temporal)   Resp 17       General: alert and oriented, in no acute distress, appears stated age, and cooperative   Wound: no erythema, no edema, no drainage, and Mepilex dressing remains clean, dry, and intact   Motion: Painful range of Motion   DVT Exam: No evidence of DVT seen on physical exam.  Negative Pratik's sign.  No significant calf/ankle edema.       NVI in lower extremity. left thigh swollen but soft to palpation. Patient reports baseline foot drop LLE. Good distal pulses bilaterally.      Data Review  Recent Results (from the past 24 hours)   CBC    Collection Time: 12/22/24 10:09 AM   Result Value Ref Range    WBC 8.5 4.4 - 11.3 x10*3/uL    nRBC 0.0 0.0 - 0.0 /100 WBCs    RBC 2.71 (L) 4.00 - 5.20 x10*6/uL    Hemoglobin 8.1 (L) 12.0 - 16.0 g/dL    Hematocrit 24.3 (L) 36.0 - 46.0 %    MCV 90 80 - 100 fL    MCH 29.9 26.0 - 34.0 pg    MCHC 33.3 32.0 - 36.0 g/dL    RDW 18.4 (H) 11.5 - 14.5 %    Platelets 252 150 - 450 x10*3/uL   CBC    Collection Time: 12/23/24  6:21 AM   Result Value Ref Range    WBC 8.4 4.4 - 11.3 x10*3/uL    nRBC 0.0 0.0 - 0.0 /100 WBCs    RBC 3.13 (L) 4.00 - 5.20 x10*6/uL    Hemoglobin 9.3 (L) 12.0 - 16.0 g/dL    Hematocrit 27.8 (L) 36.0 - 46.0 %    MCV 89 80 - 100 fL    MCH 29.7 26.0 - 34.0 pg    MCHC 33.5 32.0 - 36.0 g/dL    RDW 17.9 (H) 11.5 - 14.5 %    Platelets 328 150 - 450 x10*3/uL   Basic Metabolic Panel    Collection Time: 12/23/24  6:21 AM   Result Value Ref Range    Glucose 88 74 - 99 mg/dL    Sodium 137 136 - 145 mmol/L    Potassium 3.7 3.5 - 5.3 mmol/L    Chloride 103 98 - 107 mmol/L    Bicarbonate 25 21 - 32 mmol/L    Anion Gap 13 10 - 20 mmol/L    Urea Nitrogen 11 6 - 23 mg/dL     Creatinine 0.82 0.50 - 1.05 mg/dL    eGFR 79 >60 mL/min/1.73m*2    Calcium 10.2 8.6 - 10.3 mg/dL       Assessment:     Status Post left intramedullary nail fixation of femur fracture. Doing well postoperatively. No acute events overnight.    Acute postoperative pain: Reports mild to moderate pain to operative extremity. Exacerbated by movement, relieved by rest ice and pain medication. Continue current pain management.     Hemoglobin this A.M. 9.3, trending up. No signs of active bleeding. Extremity soft to palpation.      Plan:      1. Continues current post-op course  2.  Continue Deep venous thrombosis prophylaxis: Xarelto 10 mg daily for 28 days  3.  Continue physical therapy: 10% Weightbearing with use of walker for assistance with ambulation  4.  Continue Pain Control, script in chart  6.  Discharge planning: [facility] upon discharge, pending precert. SW and TCC following for discharge planning. Okay to discharge from orthopedic standpoint.     SUMMER Alexander-CNP   12/23/2024 7:33 AM

## 2024-12-23 NOTE — PROGRESS NOTES
"Physical Therapy    Physical Therapy Treatment    Patient Name: Halie Martinez  MRN: [id number]  Today's Date: 12/23/2024  Time Calculation  Start Time: 1148  Stop Time: 1222  Time Calculation (min): 34 min     1108/1108-A    Assessment/Plan   End of Session Communication: Bedside nurse  Assessment Comment: Patient presents with fair effort throughout todays session. Fair carry over of cues however very forgetful of hand placement with transfers. Demonstrates improved performance of transfers and ability to \"hop\" slightly lateral and posterior towards recliner. Call light and all needs in reach.  End of Session Patient Position: Up in chair, Alarm on        General Visit Information:   PT  Visit  PT Received On: 12/23/24  General  Co-Treatment: OT  Co-Treatment Reason: Due to pt's significant debility for optimal safety and therapy session  Prior to Session Communication: Bedside nurse  Patient Position Received: Bed, 3 rail up, Alarm on  General Comment: Pt agreeable to therapy this date.  Subjective     Precautions:  Precautions  LE Weight Bearing Status: Left Partial Weight Bearing (10% WBing LLE)  Medical Precautions: Fall precautions  Precautions Comment: Pt able to maintain mostly throughout session with minimal VCs       Objective     Pain:  Pain Assessment  Pain Assessment: 0-10  0-10 (Numeric) Pain Score: 9  Pain Type: Surgical pain, Acute pain  Pain Location: Hip  Pain Orientation: Left  Pain Interventions: Repositioned    Cognition:  Cognition  Overall Cognitive Status: Within Functional Limits  Orientation Level: Oriented X4  Safety Judgment: Decreased awareness of need for safety precautions  Problem Solving: Assistance required to implement solutions  Insight: Mild  Processing Speed: Within funtional limits         Treatments:           Bed Mobility  Bed Mobility: Yes  Bed Mobility 1  Bed Mobility 1: Supine to sitting  Level of Assistance 1: Minimum assistance  Bed Mobility Comments 1: Pt performed " supine<>EOB Niles for bringing LLE OOB with HOB elevated and minimal use of bedrails to lift trunk. VC vrequired for sequencing.  Ambulation/Gait Training  Ambulation/Gait Training Performed: Yes  Ambulation/Gait Training 1  Surface 1: Level tile  Device 1: Rolling walker (FWW)  Gait Support Devices: Gait belt  Assistance 1: Maximum verbal cues, Minimum assistance (Niles x2)  Quality of Gait 1: Antalgic  Comments/Distance (ft) 1: Pt ambulated 2' to chair with FWW, Niles x2 due to foot drop and difficulty hopping to maintain WBing. Pt demonstrates  ability to lift R heel backwards towards chair with heavy UE support, poor foot clearance due to foot drop. VC to improving technique. Good AD management.  Transfers  Transfer: Yes  Transfer 1  Transfer From 1: Bed to  Transfer to 1: Chair with arms  Technique 1: Stand pivot  Transfer Device 1: Walker, Gait belt (FWW)  Transfer Level of Assistance 1: Moderate assistance  Trials/Comments 1: Pt performed stand pivot from EOB<>chair ModA for pivoting motion, second therapist present for safety purposes. Pt requires max VC for sequencing,good eccentric control to sitting position. Good AD management throughout.  Transfers 2  Transfer From 2: Bed to, Chair with arms to  Transfer to 2: Stand  Technique 2: Stand to sit, Sit to stand  Transfer Device 2: Walker, Gait belt (FWW)  Transfer Level of Assistance 2: Minimum assistance, Maximum verbal cues  Trials/Comments 2: Pt performed STS multiple times throughout session from EOB/recliner<>FWW Niles. Max VC required for sequencing/proper hand placement as pt is forgetful.          Outcome Measures:     [facility] Basic Mobility  Turning from your back to your side while in a flat bed without using bedrails: A little  Moving from lying on your back to sitting on the side of a flat bed without using bedrails: A little  Moving to and from bed to chair (including a wheelchair): A lot  Standing up from a chair using your arms (e.g. wheelchair or  bedside chair): A little  To walk in hospital room: Total  Climbing 3-5 steps with railing: Total  Basic Mobility - Total Score: 13                                      Education Documentation  Precautions, taught by Leila Jaramillo PTA at 12/23/2024  2:44 PM.  Learner: Patient  Readiness: Acceptance  Method: Explanation, Demonstration  Response: Verbalizes Understanding, Needs Reinforcement    Mobility Training, taught by Leila Jaramillo PTA at 12/23/2024  2:44 PM.  Learner: Patient  Readiness: Acceptance  Method: Explanation, Demonstration  Response: Verbalizes Understanding, Needs Reinforcement    Education Comments  No comments found.           EDUCATION:  Outpatient Education  Individual(s) Educated: Patient, Significant Other  Education Provided: Body Mechanics, Fall Risk, Home Safety, Home Exercise Program, POC, Post-Op Precautions, Posture (Balance; Safety with transfers; Importance of maintaining (L)LE PWB 10%(instructed in NWB) for proper healing.)  Patient Response to Education: Patient/Caregiver Verbalized Understanding of Information, Patient/Caregiver Performed Return Demonstration of Exercises/Activities, Patient/Caregiver Asked Appropriate Questions  Education Comment: Sigjnificant amount of time spent education pt on benefits of therapy and WBing status, proper sequencing for transfers.  Encounter Problems       Encounter Problems (Active)       PT Problem       Pt will demonstrate min A  with bed mobility to edge of bed.   (Progressing)       Start:  12/20/24    Expected End:  01/03/25            Pt will demonstrate min A  with sit to stand/chair transfers with FWW.   (Progressing)       Start:  12/20/24    Expected End:  01/03/25            Pt will ambulate 15 feet with FWW min A   10 % wt bearing L LE .   (Not Progressing)       Start:  12/20/24    Expected End:  01/03/25            Pt to demo improved BLE strength by being able to complete supine/seated thera ex 2x20 BLEs with 4 or less rest  breaks .   (Not Progressing)       Start:  12/20/24    Expected End:  01/03/25               Pain - Adult

## 2024-12-24 PROCEDURE — 2500000001 HC RX 250 WO HCPCS SELF ADMINISTERED DRUGS (ALT 637 FOR MEDICARE OP): Performed by: STUDENT IN AN ORGANIZED HEALTH CARE EDUCATION/TRAINING PROGRAM

## 2024-12-24 PROCEDURE — 2500000001 HC RX 250 WO HCPCS SELF ADMINISTERED DRUGS (ALT 637 FOR MEDICARE OP): Performed by: ORTHOPAEDIC SURGERY

## 2024-12-24 RX ADMIN — ACETAMINOPHEN 650 MG: 325 TABLET ORAL at 00:00

## 2024-12-24 RX ADMIN — CYCLOBENZAPRINE HYDROCHLORIDE 10 MG: 10 TABLET, FILM COATED ORAL at 00:02

## 2024-12-24 RX ADMIN — Medication 10 MG: at 00:02

## 2024-12-26 DIAGNOSIS — S72.90XS CLOSED FRACTURE OF FEMUR, UNSPECIFIED FRACTURE MORPHOLOGY, UNSPECIFIED LATERALITY, UNSPECIFIED PORTION OF FEMUR, SEQUELA: Primary | ICD-10-CM

## 2024-12-26 DIAGNOSIS — M79.7 FIBROMYALGIA: ICD-10-CM

## 2024-12-26 RX ORDER — PREGABALIN 100 MG/1
100 CAPSULE ORAL DAILY
Qty: 30 CAPSULE | Refills: 0 | Status: SHIPPED | OUTPATIENT
Start: 2024-12-26 | End: 2025-01-25

## 2024-12-26 RX ORDER — OXYCODONE HYDROCHLORIDE 5 MG/1
5 TABLET ORAL EVERY 6 HOURS PRN
Qty: 12 TABLET | Refills: 0 | Status: SHIPPED | OUTPATIENT
Start: 2024-12-26 | End: 2024-12-27 | Stop reason: SDUPTHER

## 2024-12-27 ENCOUNTER — OFFICE VISIT (OUTPATIENT)
Dept: GERIATRIC MEDICINE | Age: 64
End: 2024-12-27

## 2024-12-27 DIAGNOSIS — S72.90XS CLOSED FRACTURE OF FEMUR, UNSPECIFIED FRACTURE MORPHOLOGY, UNSPECIFIED LATERALITY, UNSPECIFIED PORTION OF FEMUR, SEQUELA: ICD-10-CM

## 2024-12-27 DIAGNOSIS — M79.7 FIBROMYALGIA: ICD-10-CM

## 2024-12-27 DIAGNOSIS — E03.9 HYPOTHYROIDISM, UNSPECIFIED TYPE: ICD-10-CM

## 2024-12-27 DIAGNOSIS — S72.25XD CLOSED NONDISPLACED SUBTROCHANTERIC FRACTURE OF LEFT FEMUR WITH ROUTINE HEALING, SUBSEQUENT ENCOUNTER: Primary | ICD-10-CM

## 2024-12-27 RX ORDER — OXYCODONE HYDROCHLORIDE 5 MG/1
5 TABLET ORAL EVERY 6 HOURS PRN
Qty: 120 TABLET | Refills: 0 | Status: SHIPPED | OUTPATIENT
Start: 2024-12-27 | End: 2025-01-26

## 2024-12-27 ASSESSMENT — ENCOUNTER SYMPTOMS
VOMITING: 0
SINUS PAIN: 0
BACK PAIN: 0
APNEA: 0
COUGH: 0
EYE REDNESS: 0
EYE ITCHING: 0
EYE DISCHARGE: 0
TROUBLE SWALLOWING: 0
COLOR CHANGE: 0
VOICE CHANGE: 0
ABDOMINAL DISTENTION: 0
RHINORRHEA: 0
NAUSEA: 0
RECTAL PAIN: 0
BLOOD IN STOOL: 0
SORE THROAT: 0
ABDOMINAL PAIN: 0
CHEST TIGHTNESS: 0
SINUS PRESSURE: 0
DIARRHEA: 0
WHEEZING: 0
CONSTIPATION: 0
PHOTOPHOBIA: 0
FACIAL SWELLING: 0
SHORTNESS OF BREATH: 0
EYE PAIN: 0

## 2024-12-27 NOTE — PROGRESS NOTES
Subjective:      Patient ID: Solange Sharma is a 64 y.o. female Established patient, here for evaluation of the following chief complaint(s):  No chief complaint on file.      HPI  64-year-old female with a history of rheumatoid arthritis, hypothyroidism, hyperlipidemia and hypertension being admitted after sustaining a displaced oblique fracture of the left femur.        Hypothyroidism: Compliant with Synthroid 25 mcg daily          Hyperlipidemia: Compliant with Lipitor 40 mg orally daily          Essential hypertension: Compliant with atenolol 50 mg orally daily          Fibromyalgia: Compliant with Lyrica 100 mg orally daily and Flexeril.    At present he denies polyuria,  Polydipsia, constitutional, sinus, visual, cardiopulmonary, urologic, gastrointestinal, immunologic/hematologic, musculoskeletal, neurologic,dermatologic, or psychiatric complaints.    Current Outpatient Medications on File Prior to Visit   Medication Sig Dispense Refill    oxyCODONE (ROXICODONE) 5 MG immediate release tablet Take 1 tablet by mouth every 6 hours as needed for Pain for up to 4 days. Intended supply: 7 days. Take lowest dose possible to manage pain Max Daily Amount: 20 mg 12 tablet 0    pregabalin (LYRICA) 100 MG capsule Take 1 capsule by mouth daily for 30 days. Max Daily Amount: 100 mg 30 capsule 0    pregabalin (LYRICA) 50 MG capsule Take 1 capsule by mouth 2 times daily for 30 days. 60 capsule 0    Tofacitinib Citrate ER (XELJANZ XR) 11 MG TB24       lidocaine (LMX) 4 % cream Apply a half dollar sized amount to intact skin topically up to twice daily as needed for pain 1 Tube 1    levothyroxine (SYNTHROID) 25 MCG tablet Take by mouth      atenolol (TENORMIN) 50 MG tablet Take 50 mg by mouth daily      leflunomide (ARAVA) 20 MG tablet Take 20 mg by mouth daily      lovastatin (MEVACOR) 40 MG tablet Take 40 mg by mouth nightly       Current Facility-Administered Medications on File Prior to Visit   Medication Dose Route

## 2025-01-02 ENCOUNTER — OFFICE VISIT (OUTPATIENT)
Dept: ORTHOPEDIC SURGERY | Facility: CLINIC | Age: 65
End: 2025-01-02
Payer: MEDICARE

## 2025-01-02 ENCOUNTER — HOSPITAL ENCOUNTER (OUTPATIENT)
Dept: RADIOLOGY | Facility: CLINIC | Age: 65
Discharge: HOME | End: 2025-01-02
Payer: MEDICARE

## 2025-01-02 DIAGNOSIS — S72.322D DISPLACED TRANSVERSE FRACTURE OF SHAFT OF LEFT FEMUR, SUBSEQUENT ENCOUNTER FOR CLOSED FRACTURE WITH ROUTINE HEALING: Primary | ICD-10-CM

## 2025-01-02 DIAGNOSIS — M89.8X5 PAIN OF LEFT FEMUR: ICD-10-CM

## 2025-01-02 PROCEDURE — 1111F DSCHRG MED/CURRENT MED MERGE: CPT | Performed by: ORTHOPAEDIC SURGERY

## 2025-01-02 PROCEDURE — 99024 POSTOP FOLLOW-UP VISIT: CPT | Performed by: ORTHOPAEDIC SURGERY

## 2025-01-02 PROCEDURE — 99211 OFF/OP EST MAY X REQ PHY/QHP: CPT | Performed by: ORTHOPAEDIC SURGERY

## 2025-01-02 PROCEDURE — 73552 X-RAY EXAM OF FEMUR 2/>: CPT | Mod: LT

## 2025-01-02 PROCEDURE — 73552 X-RAY EXAM OF FEMUR 2/>: CPT | Mod: LEFT SIDE | Performed by: ORTHOPAEDIC SURGERY

## 2025-01-02 NOTE — PROGRESS NOTES
History of present illness patient is over 2 weeks status post insertion of the long femoral nail due to transverse displaced femur fracture.  She is currently residing at [facility].      Physical exam:      General: No acute distress or breathing difficulty or discomfort, pleasant and cooperative with the examination.    Extremities: Left leg incisions are clean dry and intact she has 1+ effusion throughout her left extremity.  Incisions are dry and intact.  She has chronic left foot drop and has an AFO for this.      Diagnostic studies: X-rays 2 views taken today of the left femur show transverse femur fracture in good alignment with nail in good position x-rays were read by Dr. Óscar Clarke    Impression: Displaced left femur fracture with IM nail routine healing    Plan: Staples were removed.  New Steri-Strips were placed.  Patient may perform 50% weight-bear with walker at all times and must have her AFO brace on her left ankle.  She will work on gait balance and ambulation.  She will follow-up in 3 to 4 weeks with x-rays of the left femur.  We plan on advancing her to 100% weight-bear as long as she continues to have routine healing.  Once if she can display independence and the ability to take care of herself she may be discharged to home.

## 2025-01-16 DIAGNOSIS — M47.817 LUMBOSACRAL SPONDYLOSIS WITHOUT MYELOPATHY: ICD-10-CM

## 2025-01-16 RX ORDER — PREGABALIN 50 MG/1
50 CAPSULE ORAL 2 TIMES DAILY
Qty: 60 CAPSULE | Refills: 0 | Status: SHIPPED | OUTPATIENT
Start: 2025-01-16 | End: 2025-02-15

## 2025-01-30 ENCOUNTER — OFFICE VISIT (OUTPATIENT)
Dept: ORTHOPEDIC SURGERY | Facility: CLINIC | Age: 65
End: 2025-01-30
Payer: MEDICARE

## 2025-01-30 ENCOUNTER — HOSPITAL ENCOUNTER (OUTPATIENT)
Dept: RADIOLOGY | Facility: CLINIC | Age: 65
Discharge: HOME | End: 2025-01-30
Payer: MEDICARE

## 2025-01-30 DIAGNOSIS — M89.8X5 PAIN OF LEFT FEMUR: ICD-10-CM

## 2025-01-30 DIAGNOSIS — S72.322D DISPLACED TRANSVERSE FRACTURE OF SHAFT OF LEFT FEMUR, SUBSEQUENT ENCOUNTER FOR CLOSED FRACTURE WITH ROUTINE HEALING: Primary | ICD-10-CM

## 2025-01-30 PROCEDURE — 73552 X-RAY EXAM OF FEMUR 2/>: CPT | Mod: LT

## 2025-01-30 PROCEDURE — 73552 X-RAY EXAM OF FEMUR 2/>: CPT | Mod: LEFT SIDE | Performed by: ORTHOPAEDIC SURGERY

## 2025-01-30 PROCEDURE — 99211 OFF/OP EST MAY X REQ PHY/QHP: CPT | Performed by: ORTHOPAEDIC SURGERY

## 2025-01-30 NOTE — PROGRESS NOTES
History of present illness patient is now 6 weeks status post IM left femoral nail.  Patient is currently at home.  She has done some home therapy but needs to start outpatient physical therapy.  She is ambulating with a walker.  She also has a history of prior to the surgery and fracture of foot drop on that left leg.      Physical exam:      General: No acute distress or breathing difficulty or discomfort, pleasant and cooperative with the examination.    Extremities: Left lower extremity the incisions are clean dry and intact minimal effusion Staples of already been removed there is no redness drainage or evidence of infection      Diagnostic studies: X-rays taken today of the left femur show fracture well aligned with good callus formation they were read by Dr. Óscar Clarke    Impression: Status post left femoral nail due to transverse femur fracture    Plan: Patient was provided outpatient physical therapy prescription she needs to work on gait balance ambulation.  She needs to try to transition to a cane as tolerated.  She will follow-up with us in 5 to 6 weeks with x-rays 2 views of the left femur and wound check

## 2025-02-18 ENCOUNTER — EVALUATION (OUTPATIENT)
Dept: PHYSICAL THERAPY | Facility: CLINIC | Age: 65
End: 2025-02-18
Payer: MEDICARE

## 2025-02-18 DIAGNOSIS — M89.8X5 PAIN OF LEFT FEMUR: ICD-10-CM

## 2025-02-18 DIAGNOSIS — S72.322D DISPLACED TRANSVERSE FRACTURE OF SHAFT OF LEFT FEMUR, SUBSEQUENT ENCOUNTER FOR CLOSED FRACTURE WITH ROUTINE HEALING: ICD-10-CM

## 2025-02-18 PROCEDURE — 97161 PT EVAL LOW COMPLEX 20 MIN: CPT | Mod: GP

## 2025-02-18 PROCEDURE — 97110 THERAPEUTIC EXERCISES: CPT | Mod: GP

## 2025-02-18 ASSESSMENT — ENCOUNTER SYMPTOMS
DEPRESSION: 0
OCCASIONAL FEELINGS OF UNSTEADINESS: 0
LOSS OF SENSATION IN FEET: 1

## 2025-02-18 ASSESSMENT — PAIN SCALES - GENERAL: PAINLEVEL_OUTOF10: 0 - NO PAIN

## 2025-02-18 ASSESSMENT — PAIN - FUNCTIONAL ASSESSMENT: PAIN_FUNCTIONAL_ASSESSMENT: 0-10

## 2025-02-18 NOTE — PROGRESS NOTES
"Physical Therapy Evaluation    Patient Name: Halie Martinez  MRN: [id number]  Time Calculation  Start Time: 1030  Stop Time: 1105  Time Calculation (min): 35 min  PT Evaluation Time Entry  PT Evaluation (Low) Time Entry: 15  PT Therapeutic Procedures Time Entry  Therapeutic Exercise Time Entry: 20                   Current Problem  1. Pain of left femur  Referral to Physical Therapy    Follow Up In Physical Therapy      2. Displaced transverse fracture of shaft of left femur, subsequent encounter for closed fracture with routine healing  Referral to Physical Therapy    Follow Up In Physical Therapy        Insurance    Insurance reviewed   Visit number: 1  [employer] MED[employer] BMN PT OT COPAY 40 DED 0,COVERAGE 100 OOP 4900(70) NO AUTH REQ       Subjective   General:  Patient is a 63 y/o F who is here today s/p L IM femoral nailing on 12/19/24 with Dr. Óscar Clarke. Initial injury occurred on 12/18/24 after pt suffered a fall at home. Patient reports that she had been doing some paperwork at home, got up to go get something and while she was standing felt like she couldn't keep her balance, felt a pop and sharp pain, and fell. Reports that she has been having L hip/LLE pain for several years, reports that it had been getting progressively worse, and she states she was told that she had a stress fracture in her L femur prior to this current injury. Patient reports that she has been sore has been having pain in her knee and leg, but denies any instability or buckling of her LLE. She was discharged form the hospital on 12/23/24 and was at a SNF for approx 3 weeks. She was then discharged from there and had [facility], which she reports did not help. Patient reports that she has been using a cane for about a year, since she had broken her foot. No signs/symptoms of DVT or infection.    PMHx significant of; HTN, osteoporosis, OA, RA, thyroid disorder    Pt goal for PT: \"to walk with confidence\"  Precautions:  Increased risk for falls, B " foot drop w/ AFO  Pain:  Current: 0/10  Worst: 8/10, L femur, throbbing and spasm  Best: 0/10  Pain Exacerbating Factors: evenings, prolonged sitting and standing, bending, lifting, squatting, stairs, ADLs/IADLs  Pain Relieving Factors: OTC pain relievers as needed    Reviewed medical screening form with pt and medical screening assessed    Imaging:   X-ray in ED on 12/18/24: 1.  Transverse fracture from the proximal left femoral diaphysis with medial angulation of the fracture fragments, full shaft displacement of the distal fracture fragment and 7 cm of foreshortening. 2. No evidence of acute trauma to the pelvis.    Objective   Knee Musculoskeletal Exam  Gait    Assistive device: walker  Gait additional comments: Decreased gait speed, decreased step length, increased double support time, increased forward trunk lean, increased BUE WB, steppage gait B d/t B foot drop w/ AFOs donned    Inspection    Left      Incision: clean, dry, intact and well-healed      Incisional drainage: none    Range of Motion    Right      Active extension: 0      Active flexion: 120    Left      Active extension: 0      Active flexion: 120    Strength    Right      Extension: 4+/5.       Flexion: 4/5.     Left      Extension: 4-/5.       Flexion: 4-/5.    Hip Musculoskeletal Exam  Gait    Assistive device: walker  Gait additional comments: Decreased gait speed, decreased step length, increased double support time, increased forward trunk lean, increased BUE WB, steppage gait B d/t B foot drop w/ AFOs donned    Range of Motion    Right      Active ROM: normal and no pain.      Left      Active ROM: normal and no pain.      Strength    Right      Extension: 4/5.       Flexion: 4+/5.       Internal rotation: 4+/5.       External rotation: 4+/5.       Adduction: 4/5.       Abduction: 4/5.     Left      Extension: 4-/5.       Flexion: 4/5.       Internal rotation: 4/5.       External rotation: 4/5.       Adduction: 4-/5.       Abduction: 4/5.          Outcome Measures:  Other Measures  Lower Extremity Funtional Score (LEFS): 12     Treatment: see HEP below    EDUCATION/HEP:  Access Code: [email or web address]  URL: [email or web address]  Date: 02/18/2025  Prepared by: Amita Bah    Exercises  - Supine Bridge  - 1 x daily - 7 x weekly - 1-2 sets - 10 reps  - Supine Short Arc Quad  - 1 x daily - 7 x weekly - 1-2 sets - 10 reps - 2-3 sec hold  - Supine Hip Adduction Isometric with Ball  - 1 x daily - 7 x weekly - 1-2 sets - 10 reps - 2-3 sec hold  - Hooklying Clamshell with Resistance  - 1 x daily - 7 x weekly - 1-2 sets - 10 reps  - Active Straight Leg Raise with Quad Set  - 1 x daily - 7 x weekly - 1-2 sets - 10 reps    Assessment:  Patient is a 63 y/o F who participated in PT evaluation this date s/p L IM femoral nailing on 12/19/24 with Dr. Óscar Clarke. Initial injury occurred on 12/18/24 after pt suffered a fall at home. Patient presents with L hip/thigh/knee pain, decreased BLE strength, impaired gait and impaired balance. Due to these impairments, pt has increased difficulty with prolonged sitting and standing, walking, bending, lifting, squatting, stairs, driving, and performing ADLs/IADLs. Pt would benefit from PT services to decrease pain, improve strength, gait and balance to facilitate return to prior level of activities as able.     Problem List: activity limitations, ADLs/IADLs/self care skills, decreased functional level, decreased knowledge of HEP, decreased knowledge of precautions, flexibility, pain, participation restrictions, posture, range of motion/joint mobility and strength.    Clinical Presentation: Stable     Level of Complexity: Low    Goals:  Pt will be independent with advanced HEP   Pt will improve BLE strength to at least 4+/5 in order to facilitate improved stability with all functional mobility  Pt will demo L LE SLS >/=10 sec without UE assist on compliant surface for functional carryover into dynamic  balance  Pt will negotiate flight of stairs mod I reciprocally without increased LLE pain  Pt will ambulate community distances independent over varying surfaces/inclines without increased LLE pain  Pt will improve LEFS score by at least 9 points (MCID) to indicate significant improvement in functional abilities.     Plan  2x/week for 10 visits     Skilled therapeutic intervention to address the above mentioned physical and functional impairments and limitations including, but not limited to: patient education, therapeutic exercise, therapeutic activity, manual therapy, body mechanics training, dry needling, blood flow restriction training, instrumented soft tissue mobilization, manual soft tissue mobilization, gait retraining, biofeedback, cryotherapy, electrical stimulation, home program development, hot pack, taping, neuromuscular re-education, self-care/home management, and vasopneumatic compression.

## 2025-03-03 ENCOUNTER — APPOINTMENT (OUTPATIENT)
Dept: PHYSICAL THERAPY | Facility: CLINIC | Age: 65
End: 2025-03-03
Payer: MEDICARE

## 2025-03-05 ENCOUNTER — TREATMENT (OUTPATIENT)
Dept: PHYSICAL THERAPY | Facility: CLINIC | Age: 65
End: 2025-03-05
Payer: MEDICARE

## 2025-03-05 DIAGNOSIS — S72.322D DISPLACED TRANSVERSE FRACTURE OF SHAFT OF LEFT FEMUR, SUBSEQUENT ENCOUNTER FOR CLOSED FRACTURE WITH ROUTINE HEALING: ICD-10-CM

## 2025-03-05 DIAGNOSIS — M89.8X5 PAIN OF LEFT FEMUR: ICD-10-CM

## 2025-03-05 PROCEDURE — 97110 THERAPEUTIC EXERCISES: CPT | Mod: GP,CQ

## 2025-03-05 ASSESSMENT — PAIN DESCRIPTION - DESCRIPTORS: DESCRIPTORS: TIGHTNESS

## 2025-03-05 ASSESSMENT — PAIN - FUNCTIONAL ASSESSMENT: PAIN_FUNCTIONAL_ASSESSMENT: 0-10

## 2025-03-05 ASSESSMENT — PAIN SCALES - GENERAL: PAINLEVEL_OUTOF10: 4

## 2025-03-05 NOTE — PROGRESS NOTES
"Physical Therapy Treatment    Patient Name: Halie Martinez  MRN: [id number]  Today's Date: 3/5/2025  Time Calculation  Start Time: 1048  Stop Time: 1133  Time Calculation (min): 45 min    Insurance  Insurance reviewed   Visit number: 2  [employer] MED[employer]ARE BMN PT OT COPAY 40 DED 0,COVERAGE 100 OOP 4900(70) NO AUTH REQ       Current Problem  1. Pain of left femur  Follow Up In Physical Therapy      2. Displaced transverse fracture of shaft of left femur, subsequent encounter for closed fracture with routine healing  Follow Up In Physical Therapy      General  Reason for Referral: L femur fx  Referred By: Dr. Óscar Clarke    Subjective    General  Pt reports \"I was in so much pain\" following initial visit.  States sx's remained \"for a good 2 days\".   She states she believes having the AFO's on while doing the ex's is what may have increased her pain.  Mild c/o discomfort today.  No new complaints.    Precautions  Precautions  Precautions Comment: No new falls reported    Pain  Pain Assessment  Pain Assessment: 0-10  0-10 (Numeric) Pain Score: 4  Pain Location: Hip  Pain Orientation: Left  Pain Radiating Towards: from hip to knee  Pain Descriptors: Tightness (\"Intense\")  Pain Frequency: Intermittent  Effect of Pain on Daily Activities: Walking, Standing>5-10 min; Stairs; Bathing; Dressing; Household Activities; Driving    Objective   Pt ambulates with wheeled walker for support.  However, states she uses a wheelchair at home due to having difficulty carrying anything if she is using the walker.    Treatments:  Therapeutic Exercises (22720): 40 Minutes, 3 Units    Activities:  ANUSHA: --> Add NV  Hooklying Hip Adduction Squeeze: 5\" x20  Hooklying TB Hip Abduction: GTB, 2x10  QS: 5\" x20  SLR: x10 w/mild assist  SL SLR: --> Add NV  Clamshell: --> Add NV  SAQ: 2x10  Bridge:  HR/TR: --> Add NV    Assessment:   Reviewed activities initiated first visit, providing vc's as needed for overall technique.  She exhibits fairly good recall " of the initial activities & had good follow through to any cues provided.  She had difficulty completing SLR this visit without mild assist provided secondary to overall weakness.  Also challenged with remaining activities secondary to general weakness, but no assist needed to complete.  Overall, she was appropriately challenged still with the initial activities, normal muscle soreness, fatigue noted.  Other than Quad Set added to activities performed this visit, no new activities were added secondary to increase in sx's reported for a couple of days following initial visit.  Anticipate she will be able to continue progressing activities/POC at next visit.     Plan:   Assess response to activities performed again this visit.  Plan to continue with POC & progress activities at next visit, as tolerated, if no significant increase or lasting increase in sx's again following today's visit.

## 2025-03-10 ENCOUNTER — TREATMENT (OUTPATIENT)
Dept: PHYSICAL THERAPY | Facility: CLINIC | Age: 65
End: 2025-03-10
Payer: MEDICARE

## 2025-03-10 ENCOUNTER — OFFICE VISIT (OUTPATIENT)
Dept: ORTHOPEDIC SURGERY | Facility: CLINIC | Age: 65
End: 2025-03-10
Payer: MEDICARE

## 2025-03-10 ENCOUNTER — HOSPITAL ENCOUNTER (OUTPATIENT)
Dept: RADIOLOGY | Facility: CLINIC | Age: 65
Discharge: HOME | End: 2025-03-10
Payer: MEDICARE

## 2025-03-10 DIAGNOSIS — M89.8X5 PAIN OF LEFT FEMUR: Primary | ICD-10-CM

## 2025-03-10 DIAGNOSIS — S72.322D DISPLACED TRANSVERSE FRACTURE OF SHAFT OF LEFT FEMUR, SUBSEQUENT ENCOUNTER FOR CLOSED FRACTURE WITH ROUTINE HEALING: ICD-10-CM

## 2025-03-10 DIAGNOSIS — M89.8X5 PAIN OF LEFT FEMUR: ICD-10-CM

## 2025-03-10 PROCEDURE — 73552 X-RAY EXAM OF FEMUR 2/>: CPT | Mod: LT

## 2025-03-10 PROCEDURE — 97110 THERAPEUTIC EXERCISES: CPT | Mod: GP,CQ

## 2025-03-10 PROCEDURE — 73552 X-RAY EXAM OF FEMUR 2/>: CPT | Mod: LEFT SIDE | Performed by: ORTHOPAEDIC SURGERY

## 2025-03-10 PROCEDURE — 99211 OFF/OP EST MAY X REQ PHY/QHP: CPT | Performed by: ORTHOPAEDIC SURGERY

## 2025-03-10 ASSESSMENT — PAIN DESCRIPTION - DESCRIPTORS: DESCRIPTORS: TIGHTNESS;ACHING

## 2025-03-10 ASSESSMENT — PAIN - FUNCTIONAL ASSESSMENT: PAIN_FUNCTIONAL_ASSESSMENT: 0-10

## 2025-03-10 ASSESSMENT — PAIN SCALES - GENERAL: PAINLEVEL_OUTOF10: 4

## 2025-03-10 NOTE — PROGRESS NOTES
History of present illness patient is almost 3 months status post IM nail left midshaft femur fracture.  She is ambulating with her walker.  She has history of chronic foot drop bilaterally which makes gait balance and ambulation more difficult.  She is in physical therapy.      Physical exam:      General: No acute distress or breathing difficulty or discomfort, pleasant and cooperative with the examination.    Extremities: Left lower extremity incisions well-healing intact she still has some effusion throughout the left lower extremity definitely noticeable around her ankle      Diagnostic studies: X-rays taken today of the femur show routine healing of femur fracture with nail in good position there read by Dr. Óscar Clarke    Impression: Status post left femur IM nail    Plan: Patient is going to continue to work with physical therapy on gait balance and ambulation.  Weight-bear as tolerated.  She will follow-up with us in 4 months with x-rays again of the femur sooner if she has any issues

## 2025-03-10 NOTE — PROGRESS NOTES
Physical Therapy Treatment    Patient Name: Halie Martinez  MRN: [id number]  Today's Date: 3/10/2025  Time Calculation  Start Time: 1045  Stop Time: 1125  Time Calculation (min): 40 min     PT Therapeutic Procedures Time Entry  Therapeutic Exercise Time Entry: 40             65 y/o F who participated in PT evaluation this date s/p L IM femoral nailing on 12/19/24 with Dr. Óscar Clarke. Initial injury occurred on 12/18/24 after pt suffered a fall at home.      Insurance:   Visit number: 3  [employer] MEDICARE BMN PT OT COPAY 40 DED 0,COVERAGE 100 OOP [id number](70)   NO AUTH REQ     General  Reason for Referral: L femur fx  Referred By: Dr. Óscar Clarke    Assessment:    Pt 11 weeks post-op IM nailing for Femur Fracture. Pt w/ difficulty performing SLR w/o assistance, mainly dt fatigue/weakness LLE. Pt w/ AFOs Bilat which are heavy making some ex's more challenging dt having them on. Despite L hip discomfort (jt discomfort) upon entering clinic pt was able to complete given ex's today w/ little increase in familiar pain. Also added sit to stand today from elevated plinth. Pt initially hesitant to perform as she said she's been using her hands for assist to stand for a long time. With encouragement and cues pt was able to stand w/o UE assistance however did have difficulty doing so dt weakness. She was able to do 2x however was mod'ly fatigued. We will cont to address this in upcoming visits.  Updated pt's HEP w/ encouragement to perform w/o braces on her legs to help her complete w/o needed therapist assist dt increased weight. Educated her on importance of ex, gait mechanics and increasing her endurance to improve her overall functional ability.     Plan:  Progress program as chevy w/ emphasis on improving pt's ROM, Strength and overall functional ability.     Current Problem  1. Pain of left femur  Follow Up In Physical Therapy      2. Displaced transverse fracture of shaft of left femur, subsequent encounter for closed  "fracture with routine healing  Follow Up In Physical Therapy          Subjective   General  Reason for Referral: L femur fx  Referred By: Dr. Óscar Clarke Pt states she was nowhere near as sore after the last session as the 1st one. Pt also reports having to walk from detached garage to her house as she accidentally locked her car keys in her trunk. Pt states she was able to do it w/o too much difficulty. Pt drove herself to appt for the 1st time today w/ no issues reported.   Precautions       Pain  Pain Assessment: 0-10  0-10 (Numeric) Pain Score: 4  Pain Location: Hip  Pain Orientation: Left  Pain Descriptors: Tightness, Aching  Pain Frequency: Intermittent  Effect of Pain on Daily Activities: Walking, Standing>5-10 min; Stairs; Bathing; Dressing; Household Activities; Driving    Objective       Treatments:  Therapeutic Exercise (74642):   ANUSHA: --> Add NV  Hooklying Hip Adduction Squeeze: 5\" x20--  Hooklying TB Hip Abduction: GTB, 2x10  QS: 5\" x10  SLR: x10 w/mild assist  *SL SLR: x8 w/ mild assist  *Clamshell: x10  SAQ: 2x10  LAQ: 2x10   Bridge: x10   Sit to Stand from elevated plinth x2     *Added to HEP    EDUCATION:   Access Code: [email or web address]  URL: [email or web address]  Date: 03/10/2025  Prepared by: Bradly Hernandez    Exercises  - Clamshell  - 1 x daily - 5-6 x weekly - 1 sets - 10 reps  - Sidelying Hip Abduction (Mirrored)  - 1 x daily - 5-6 x weekly - 1 sets - 10 reps      "

## 2025-03-12 ENCOUNTER — TREATMENT (OUTPATIENT)
Dept: PHYSICAL THERAPY | Facility: CLINIC | Age: 65
End: 2025-03-12
Payer: MEDICARE

## 2025-03-12 DIAGNOSIS — S72.322D DISPLACED TRANSVERSE FRACTURE OF SHAFT OF LEFT FEMUR, SUBSEQUENT ENCOUNTER FOR CLOSED FRACTURE WITH ROUTINE HEALING: ICD-10-CM

## 2025-03-12 DIAGNOSIS — M89.8X5 PAIN OF LEFT FEMUR: ICD-10-CM

## 2025-03-12 PROCEDURE — 97110 THERAPEUTIC EXERCISES: CPT | Mod: GP,CQ

## 2025-03-12 ASSESSMENT — PAIN SCALES - GENERAL: PAINLEVEL_OUTOF10: 6

## 2025-03-12 ASSESSMENT — PAIN - FUNCTIONAL ASSESSMENT: PAIN_FUNCTIONAL_ASSESSMENT: 0-10

## 2025-03-12 ASSESSMENT — PAIN DESCRIPTION - DESCRIPTORS: DESCRIPTORS: ACHING;SORE

## 2025-03-12 NOTE — PROGRESS NOTES
"Physical Therapy Treatment    Patient Name: Halie Martinez  MRN: [id number]  Today's Date: 3/12/2025  Time Calculation  Start Time: 1046  Stop Time: 1130  Time Calculation (min): 44 min    Insurance  Visit number: 4  [employer] MEDICARE BMN PT OT COPAY 40 DED 0,COVERAGE 100 OOP [id number](70)   NO AUTH REQ      Current Problem  1. Pain of left femur  Follow Up In Physical Therapy      2. Displaced transverse fracture of shaft of left femur, subsequent encounter for closed fracture with routine healing  Follow Up In Physical Therapy      General  Reason for Referral: L femur fx  Referred By: Dr. Óscar Clarke    Subjective    General   Pt reports increased sx's following previous visit & states she has been limited for the past couple of days.  \"I had plans for yesterday, but I couldn't because I was still so sore.\"  Also reports she has not been able to do HEP for past couple days because of increased sx's.  States she is \"not too good\" today still, with soreness still present.    Precautions  Precautions  Precautions Comment: No new falls reported    Pain  Pain Assessment  Pain Assessment: 0-10  0-10 (Numeric) Pain Score: 6  Pain Location: Hip  Pain Orientation: Left  Pain Radiating Towards: into L groin, knee  Pain Descriptors: Aching, Sore  Pain Frequency: Constant/continuous  Effect of Pain on Daily Activities: Walking, Standing>5-10 min; Stairs; Bathing; Dressing; Household Activities; Driving    Objective   Pt ambulates with wheeled walker.      Treatments:  Therapeutic Exercises (24087): 42 Minutes, 3 Units    Activities:  Therapeutic Exercise (89884):  ANUSHA: --> Add NV  Hooklying Hip Adduction Squeeze: 5\" x20  Hooklying TB Hip Abduction: GTB, 2x10  QS: 5\" 2x10  SLR: x10 w/mild assist  SL SLR: x10 w/ mild assist  Clamshell: x10  SAQ: 2x10 (not this visit)  LAQ: 2x10   Bridge: x10   Sit to Stand from elevated plinth x2      *Added to HEP       Assessment:   Continued with current activities this visit, without progressions " "or new activities secondary to increase in sx's following previous visit, which are still present today.  She exhibits some difficulty with activities due to c/o pain & general weakness.  Requires some assist to complete activities, but completes all without further modifications.  She does still report pain post activity, but overall states her leg \"feels better\" from when she first arrived today.  Anticipate she will be able to resume progressions to activities at next visit.    Plan:   Assess sx's following today's session.  Continue with POC & progress with activities, as tolerated, to increase ROM, strength & stability of L LE and improve functional mobility, capacity for performing daily activities.  "

## 2025-03-17 ENCOUNTER — TREATMENT (OUTPATIENT)
Dept: PHYSICAL THERAPY | Facility: CLINIC | Age: 65
End: 2025-03-17
Payer: MEDICARE

## 2025-03-17 DIAGNOSIS — S72.322D DISPLACED TRANSVERSE FRACTURE OF SHAFT OF LEFT FEMUR, SUBSEQUENT ENCOUNTER FOR CLOSED FRACTURE WITH ROUTINE HEALING: ICD-10-CM

## 2025-03-17 DIAGNOSIS — M89.8X5 PAIN OF LEFT FEMUR: Primary | ICD-10-CM

## 2025-03-17 PROCEDURE — 97110 THERAPEUTIC EXERCISES: CPT | Mod: GP,CQ

## 2025-03-17 ASSESSMENT — PAIN DESCRIPTION - DESCRIPTORS: DESCRIPTORS: ACHING;SORE

## 2025-03-17 ASSESSMENT — PAIN SCALES - GENERAL: PAINLEVEL_OUTOF10: 3

## 2025-03-17 ASSESSMENT — PAIN - FUNCTIONAL ASSESSMENT: PAIN_FUNCTIONAL_ASSESSMENT: 0-10

## 2025-03-17 NOTE — PROGRESS NOTES
"Physical Therapy Treatment    Patient Name: Halie Martinez  MRN: [id number]  Today's Date: 3/17/2025  Time Calculation  Start Time: 1047  Stop Time: 1119  Time Calculation (min): 32 min     PT Therapeutic Procedures Time Entry  Therapeutic Exercise Time Entry: 32                 Insurance:   Visit number: 5  [employer] MEDICARE BMN PT OT COPAY 40 DED 0,COVERAGE 100 OOP [id number](70)   NO AUTH REQ   Assessment:   Demo'd posture and using UE less w/ wheeled walker, flo as her strength and balance improves as to not cause added stress on her shoulders. Pt tends to amb w/ forward flexed posture, bilat shoulder hiking and forward neck which is all contributing to increased shoulder and neck pain w/ walker use. Pt able to complete SLR today w/ min to no assist from therapist however did fatigue quickly and demo mod difficulty. Held Clamshells and SLR Abd today per pt request dt increased groin pain. Encouraged her to work on at home later today as able. Resumed SAQ since we omitted Clams and SLR Abd this visit. Pt fatigues w/ most ex's at this time. Progressed her ex's this visit w/ addition of Stdg Hip abd and Ext LLE only w/ max challenge w/ ext dt poor hip and glute strength. Pt w/ much difficulty even getting L foot off floor to perform ext motion.  Would like to attempt ANUSHA next visit if pt's groin pain is better. ANUSHA may be difficult for her however, due to her Bilat AFOs.     Plan:  Continue with POC & progress with activities, as tolerated, to increase ROM, strength & stability of L LE and improve functional mobility, capacity for performing daily activities.     Current Problem  1. Pain of left femur  Follow Up In Physical Therapy      2. Displaced transverse fracture of shaft of left femur, subsequent encounter for closed fracture with routine healing  Follow Up In Physical Therapy          Subjective   General  Reason for Referral: L femur fx  Referred By: Dr. Óscar Clarke Pt states she is having \"Groin issues\". \"It's " "real annoying.\" Pt states it seems like anything seems to bother it. Pt reports not using her wheelchair at home as much and is using wheeled walker more which is causing her to have some issues/soreness in both of her shoulders and neck.   Pt moderately sore after last PT session.   Precautions  Precautions  Precautions Comment: No new falls reported    Pain  Pain Assessment: 0-10  0-10 (Numeric) Pain Score: 3  Pain Location: Hip  Pain Orientation: Left  Pain Radiating Towards: into L groin, knee  Pain Descriptors: Aching, Sore  Pain Frequency: Constant/continuous  Effect of Pain on Daily Activities: Walking, Standing>5-10 min; Stairs; Bathing; Dressing; Household Activities; Driving    Objective       Treatments:  Therapeutic Exercise (98509):   ANUSHA: --> Add NV  Hooklying Hip Adduction Squeeze: 5\" x20  Hooklying TB Hip Abduction: GTB, 2x10  QS: 5\" 2x10  SLR: 2x5 min to no assist  SL SLR: x10 w/ mild assist - Held today  Clamshell: x10 - Held today   SAQ: 2x10   LAQ: 2x10   Bridge: x10   Sit to Stand from elevated plinth x2    Stdg Hip Abd, Ext (L only) x10/x5  *Added to HEP    EDUCATION:         "

## 2025-03-19 ENCOUNTER — TREATMENT (OUTPATIENT)
Dept: PHYSICAL THERAPY | Facility: CLINIC | Age: 65
End: 2025-03-19
Payer: MEDICARE

## 2025-03-19 DIAGNOSIS — S72.322D DISPLACED TRANSVERSE FRACTURE OF SHAFT OF LEFT FEMUR, SUBSEQUENT ENCOUNTER FOR CLOSED FRACTURE WITH ROUTINE HEALING: ICD-10-CM

## 2025-03-19 DIAGNOSIS — M89.8X5 PAIN OF LEFT FEMUR: ICD-10-CM

## 2025-03-19 PROCEDURE — 97110 THERAPEUTIC EXERCISES: CPT | Mod: GP,CQ

## 2025-03-19 ASSESSMENT — PAIN - FUNCTIONAL ASSESSMENT: PAIN_FUNCTIONAL_ASSESSMENT: 0-10

## 2025-03-19 ASSESSMENT — PAIN DESCRIPTION - DESCRIPTORS: DESCRIPTORS: ACHING

## 2025-03-19 ASSESSMENT — PAIN SCALES - GENERAL: PAINLEVEL_OUTOF10: 1

## 2025-03-19 NOTE — PROGRESS NOTES
"Physical Therapy Treatment    Patient Name: Halie Martinez  MRN: [id number]  Today's Date: 3/19/2025  Time Calculation  Start Time: 1051  Stop Time: 1140  Time Calculation (min): 49 min    Insurance  Visit number: 6  [employer] MEDICARE BMN PT OT COPAY 40 DED 0,COVERAGE 100 OOP 4900(70)   NO AUTH REQ     Current Problem  1. Pain of left femur  Follow Up In Physical Therapy      2. Displaced transverse fracture of shaft of left femur, subsequent encounter for closed fracture with routine healing  Follow Up In Physical Therapy      General  Reason for Referral: L femur fx  Referred By: Dr. Óscar Clarke    Subjective    General   Pt reports feeling pretty good following visit on Monday & states she has felt good the past couple days.  No current c/o groin pain.  She reports \"pretty good\" compliance with HEP & feels they are getting easier.  She reports increased standing tolerance, but states she still has a lot of difficulty, discomfort when trying to ascend stairs.  States when climbing stairs & fully WB through L LE, she feels as if hip is still very unstable.      Precautions  Precautions  Precautions Comment: No new falls reported    Pain  Pain Assessment  Pain Assessment: 0-10  0-10 (Numeric) Pain Score: 1  Pain Location: Hip  Pain Orientation: Left  Pain Radiating Towards: none reported today  Pain Descriptors: Aching  Pain Frequency: Intermittent  Effect of Pain on Daily Activities: Walking, Standing>5-10 min; Stairs; Bathing; Dressing; Household Activities; Driving (Improving)    Objective   Pt ambulates with wheeled walker for support.  Reports only using wheelchair at home now first thing in the morning, due to having to carry a pitcher of water & not being able to manage with the walker.  Otherwise, states she is using her walker \"the majority of the time.\"    Treatments:  Therapeutic Exercises (78961): 45 Minutes, 3 Units    Activities:  Therapeutic Exercise (61249):   ANUSHA: 6 min  Hooklying Hip Adduction " "Squeeze: 5\" x20  Hooklying TB Hip Abduction: GTB, 2x10  QS: 5\" 2x10  SLR: 2x5 min to no assist  SL SLR: x10 w/ mild assist - Held today -->Resume NV  Clamshell: x10 - Held today --> Resume NV  SAQ: 2x10 (not this visit)  LAQ: 2x10   Bridge: x10   Sit to Stand from elevated plinth x5   Stdg Hip Abd, Ext (L only) x10/x5     Assessment:   Introduce ANUSHA this visit to increase LE strength & improve mobility, endurance.  She was able to complete with good tolerance & no increase in sx's reported.  Continued with remaining rehab activities to increase LE strength.  She is showing mild improvement in LE strength, as seen in ability to complete SLR with minimal assist.  However, still moderately challenged, raising only a couple of inches & fatiguing very quickly.  Also noted with increased capacity to perform sit to stand, with pt completing 5 reps this visit without UE support.  However, still lacks some eccentric control when returning to sitting.  She was hesitant to perform stdg hip PRE's & states they cause increased discomfort, however, was able to complete with vc'ing for technique and no increase in sx's reported.  Overall, she remains moderately challenged with current rehab activities secondary to overall weakness.  She will continue to benefit from skilled PT services to further address deficits in ROM, strength & stability and improve functional mobility, capacity to return to PLOF with daily activities.    Plan:   Continue with POC & progress with activities, as tolerated, to increase ROM, strength & stability of L LE and improve functional mobility, capacity for performing daily activities.     "

## 2025-03-20 NOTE — PROGRESS NOTES
"Physical Therapy Treatment    Patient Name: Halie Martinez  MRN: [id number]  Today's Date: 3/24/2025  Time Calculation  Start Time: 1048  Stop Time: 1130  Time Calculation (min): 42 min     PT Therapeutic Procedures Time Entry  Therapeutic Exercise Time Entry: 40                 Current Problem  1. Pain of left femur  Follow Up In Physical Therapy      2. Displaced transverse fracture of shaft of left femur, subsequent encounter for closed fracture with routine healing  Follow Up In Physical Therapy          Insurance:  Visit number: 7  [employer] MED[employer] BMN PT OT COPAY 40 DED 0,COVERAGE 100 OOP [id number]([id number])   NO AUTH REQ   Precautions   Increased risk for falls, B foot drop w/ AFO     Subjective   Subjective:   Pt she has had a lot of pain the past few days. She feels a lot of the pain in the side of her L hip and the groin area. Pt nervous to put all her weigh through the L side.  Pain   4/10    Objective   Treatments:  Therapeutic Exercise (43429):   ANUSHA: 6 min  Hooklying Hip Adduction Squeeze: 5\" x15  Hooklying TB Hip Abduction: isometric , 5\"x10  QS: 5\" x10  SLR: 2x5 min to no assist-----  SL SLR: x10 w/ mild assist - Held today -->Resume NV  Clamshell: x10 - Held today --> Resume NV  SAQ: 2x10   LAQ: 2x10----  LAQ iso 5\"x20  Seated HSC RTB 2x10  Weight shifting // bars 5\"x15  TKE RTB // bars 5\"x15  Bridge: x10 ----  Sit to Stand from elevated plinth x5 ----  Stdg Hip Abd, Ext (L only) x10/x5---  OP EDUCATION:   Allowing the L hip to calm down before a lot of strengthening ex      Assessment:   Pt able to do ANUSHA without a lot of difficulty. Pt tender to the touch along the lateral hip. Pt had increased pain with quad sets. Tried isometric hip abd, which still caused some pain, so told pt to not push as hard. Pt had increased pain with bridges, thus didn't perform. Tactile cues provided with SAQ for hold times, to avoid pain. Did okay with seated LAQ iso and HSC, but weakness displayed. Cues given with TKE to keep foot on " the ground and not lean fwd at the waist. Pt fatigued with ex given.     Plan:   Cont with quad strengthening as pain allows

## 2025-03-24 ENCOUNTER — TREATMENT (OUTPATIENT)
Dept: PHYSICAL THERAPY | Facility: CLINIC | Age: 65
End: 2025-03-24
Payer: MEDICARE

## 2025-03-24 DIAGNOSIS — S72.322D DISPLACED TRANSVERSE FRACTURE OF SHAFT OF LEFT FEMUR, SUBSEQUENT ENCOUNTER FOR CLOSED FRACTURE WITH ROUTINE HEALING: ICD-10-CM

## 2025-03-24 DIAGNOSIS — M89.8X5 PAIN OF LEFT FEMUR: Primary | ICD-10-CM

## 2025-03-24 PROCEDURE — 97110 THERAPEUTIC EXERCISES: CPT | Mod: GP,CQ

## 2025-03-26 ENCOUNTER — TREATMENT (OUTPATIENT)
Dept: PHYSICAL THERAPY | Facility: CLINIC | Age: 65
End: 2025-03-26
Payer: MEDICARE

## 2025-03-26 DIAGNOSIS — S72.322D DISPLACED TRANSVERSE FRACTURE OF SHAFT OF LEFT FEMUR, SUBSEQUENT ENCOUNTER FOR CLOSED FRACTURE WITH ROUTINE HEALING: ICD-10-CM

## 2025-03-26 DIAGNOSIS — M89.8X5 PAIN OF LEFT FEMUR: ICD-10-CM

## 2025-03-26 PROCEDURE — 97110 THERAPEUTIC EXERCISES: CPT | Mod: GP,CQ

## 2025-03-26 ASSESSMENT — PAIN - FUNCTIONAL ASSESSMENT: PAIN_FUNCTIONAL_ASSESSMENT: 0-10

## 2025-03-26 ASSESSMENT — PAIN DESCRIPTION - DESCRIPTORS: DESCRIPTORS: ACHING

## 2025-03-26 ASSESSMENT — PAIN SCALES - GENERAL: PAINLEVEL_OUTOF10: 3

## 2025-03-26 NOTE — PROGRESS NOTES
"Physical Therapy Treatment    Patient Name: Halie Martinez  MRN: [id number]  Today's Date: 3/26/2025  Time Calculation  Start Time: 1047  Stop Time: 1135  Time Calculation (min): 48 min    Insurance  Visit number: 8  [employer] MED[employer]ARE BMN PT OT COPAY 40 DED 0,COVERAGE 100 OOP [id number])   NO AUTH REQ     Current Problem  1. Pain of left femur  Follow Up In Physical Therapy      2. Displaced transverse fracture of shaft of left femur, subsequent encounter for closed fracture with routine healing  Follow Up In Physical Therapy      General  Reason for Referral: L femur fx  Referred By: Dr. Óscar Clarke    Subjective    General   Pt reports she is doing well today.  No new complaints.  States she did not have as much discomfort following previous visit, but did still feel like she got a good workout.  She continues to have some discomfort & feeling \"like it will snap\" in L hip/groin area.  \"Just like it was before\".    Precautions  Precautions  Precautions Comment: No new falls reported    Pain  Pain Assessment  Pain Assessment: 0-10  0-10 (Numeric) Pain Score: 3  Pain Location: Hip  Pain Orientation: Left  Pain Descriptors: Aching  Pain Frequency: Intermittent  Effect of Pain on Daily Activities: Walking, Standing>5-10 min; Stairs; Bathing; Dressing; Household Activities; Driving    Objective   Pt ambulates with wheeled walker for support.     Treatments:  Therapeutic Exercises (28880): 44 Minutes, 3 Units    Activities:  ANUSHA: 6 min  Hooklying Hip Adduction Squeeze: 5\" x15  Hooklying TB Hip Abduction: isometric , 5\"x10  QS: 5\" x15  SLR: 2x5 min to no assist-----  SL SLR: x10 w/ mild assist - Held today -->Resume NV  Clamshell: x10 - Held today --> Resume NV  SAQ: 2x10   LAQ: x10  LAQ iso 5\"x15  Seated HSC RTB 2x10  Weight shifting // bars 5\"x15  TKE RTB // bars 5\"x15  Bridge: x10 ---p+ held  Sit to Stand from elevated plinth x5 ----  Stdg Hip Abd, Ext (L only) x10/x5---    Assessment:   Pt struggles a little this visit " keeping her feet on the pedals of ANUSHA.  Otherwise, completes ANUSHA with good tolerance.  She continues with current activities, which she is appropriately challenged with still.  She completes with some difficulty secondary to general weakness & occasional c/o pain through L hip/groin.  When pain occurs, stopping activity helps to relieve.  Overall, she did fairly well with the activities given & completes with normal muscle soreness, fatigue reported.  Anticipate she will be able to continue with POC & progress with activities as tolerated.    Plan:   Continue with POC & progress with activities, as tolerated, to increase ROM, strength & stability of L LE and improve functional mobility, capacity for performing daily activities.

## 2025-03-31 ENCOUNTER — TREATMENT (OUTPATIENT)
Dept: PHYSICAL THERAPY | Facility: CLINIC | Age: 65
End: 2025-03-31
Payer: MEDICARE

## 2025-03-31 DIAGNOSIS — M89.8X5 PAIN OF LEFT FEMUR: ICD-10-CM

## 2025-03-31 DIAGNOSIS — S72.322D DISPLACED TRANSVERSE FRACTURE OF SHAFT OF LEFT FEMUR, SUBSEQUENT ENCOUNTER FOR CLOSED FRACTURE WITH ROUTINE HEALING: ICD-10-CM

## 2025-03-31 PROCEDURE — 97110 THERAPEUTIC EXERCISES: CPT | Mod: GP

## 2025-03-31 NOTE — PROGRESS NOTES
Physical Therapy Recheck/Treatment    Patient Name: Halie Martinez  MRN: [id number]  Time Calculation  Start Time: 1025  Stop Time: 1105  Time Calculation (min): 40 min     PT Therapeutic Procedures Time Entry  Therapeutic Exercise Time Entry: 40                   Current Problem  1. Pain of left femur  Follow Up In Physical Therapy      2. Displaced transverse fracture of shaft of left femur, subsequent encounter for closed fracture with routine healing  Follow Up In Physical Therapy      Insurance  Visit number: 9  [employer] MEDICARE BMN PT OT COPAY 40 DED 0,COVERAGE 100 OOP [id number])   NO AUTH REQ     Subjective   General  Patient reports that she is continuing to improve, however she is still using FWW for mobility, which she would like to get rid of. Continues to c/o pain mostly in L groin and lateral L hip.  Precautions  Increased risk for falls, B foot drop w/ AFO   Pain  3-4/10 L lateral hip    Objective   Knee Musculoskeletal Exam  Gait    Assistive device: walker  Gait additional comments: Decreased gait speed, decreased step length, increased double support time, increased forward trunk lean, increased BUE WB, steppage gait B d/t B foot drop w/ AFOs donned    Range of Motion    Right      Right knee range of motion is normal.      Left      Left knee range of motion is normal.      Strength    Right      Extension: 4+/5.       Flexion: 4/5.     Left      Extension: 4/5.       Flexion: 4/5.      Hip Musculoskeletal Exam  Gait    Assistive device: walker  Gait additional comments: Decreased gait speed, decreased step length, increased double support time, increased forward trunk lean, increased BUE WB, steppage gait B d/t B foot drop w/ AFOs donned    Range of Motion    Right      Active ROM: normal.      Left      Active ROM: normal.      Strength    Right      Extension: 4/5.       Flexion: 4/5.       Internal rotation: 4/5.       External rotation: 4/5.       Adduction: 4/5.       Abduction: 4/5.     Left       "Extension: 4-/5.       Flexion: 4/5.       Internal rotation: 4/5.       External rotation: 4/5.       Adduction: 4/5.       Abduction: 4/5.       Treatments:  Includes measurements for recheck  Bridges: 2x10  SLR: 2x5  LAQ: 3\" x10  STS: x5  Standing marches/HSC: x20 each  Standing hip hikes: x10    Outcome Measures:  Other Measures  Lower Extremity Funtional Score (LEFS): 25    OP EDUCATION/HEP:      Assessment   Patient is a 63 y/o F who has completed 9 PT visits s/p L IM femoral nailing on 12/19/24 with Dr. Óscar Clarke. Initial injury occurred on 12/18/24 after pt suffered a fall at home. Patient is making consistent progress towards all functional goals since starting PT, and feels she is at least 70-75% improved. Patient demos decreased pain, improved BLE strength, improved gait and improved balance. Patient continues to have increased difficulty with prolonged sitting and standing, walking, bending, lifting, squatting, stairs, driving, and performing ADLs/IADLs, and she continues to require FWW for mobility. Patient would continue to benefit from PT services to continue to decrease pain, increase strength, gait and balance to facilitate return to prior level of activities as able.    Problem List: activity limitations, ADLs/IADLs/self care skills, decreased functional level, decreased knowledge of HEP, decreased knowledge of precautions, flexibility, pain, participation restrictions, posture, range of motion/joint mobility and strength.     Goals:  Pt will be independent with advanced HEP PROGRESSING  Pt will improve BLE strength to at least 4+/5 in order to facilitate improved stability with all functional mobility PROGRESSING  Pt will demo L LE SLS >/=10 sec without UE assist on compliant surface for functional carryover into dynamic balance PROGRESSING  Pt will negotiate flight of stairs mod I reciprocally without increased LLE pain PROGRESSING  Pt will ambulate community distances independent over " varying surfaces/inclines without increased LLE pain PROGRESSING  Pt will improve LEFS score by at least 9 points (MCID) to indicate significant improvement in functional abilities. PROGRESSING    Plan   2x/wk for 6 visits, then 1x/wk for 4 visits    Planned interventions include: aquatic therapy, biofeedback, cryotherapy, dry needling, education/instruction, electrical stimulation, home program, hot pack, kinesiotaping, manual therapy, neuromuscular re-education, self care/home management, therapeutic activities, therapeutic exercises, ultrasound and vasopneumatic device w/ cold

## 2025-04-07 ENCOUNTER — TREATMENT (OUTPATIENT)
Dept: PHYSICAL THERAPY | Facility: CLINIC | Age: 65
End: 2025-04-07
Payer: MEDICARE

## 2025-04-07 DIAGNOSIS — S72.322D DISPLACED TRANSVERSE FRACTURE OF SHAFT OF LEFT FEMUR, SUBSEQUENT ENCOUNTER FOR CLOSED FRACTURE WITH ROUTINE HEALING: ICD-10-CM

## 2025-04-07 DIAGNOSIS — M89.8X5 PAIN OF LEFT FEMUR: Primary | ICD-10-CM

## 2025-04-07 PROCEDURE — 97110 THERAPEUTIC EXERCISES: CPT | Mod: GP,CQ

## 2025-04-07 ASSESSMENT — PAIN - FUNCTIONAL ASSESSMENT: PAIN_FUNCTIONAL_ASSESSMENT: 0-10

## 2025-04-07 ASSESSMENT — PAIN SCALES - GENERAL: PAINLEVEL_OUTOF10: 2

## 2025-04-07 ASSESSMENT — PAIN DESCRIPTION - DESCRIPTORS: DESCRIPTORS: ACHING

## 2025-04-07 NOTE — PROGRESS NOTES
"Physical Therapy Treatment    Patient Name: Halie Martinez  MRN: [id number]  Today's Date: 4/7/2025  Time Calculation  Start Time: 1132  Stop Time: 1210  Time Calculation (min): 38 min     PT Therapeutic Procedures Time Entry  Therapeutic Exercise Time Entry: 38                 Insurance:   Visit number: 10   [employer] MED[employer]ARE BMN PT OT COPAY 40 DED 0,COVERAGE 100 OOP [id number]([id number])   NO AUTH REQ     65 y/o F s/p L IM femoral nailing on 12/19/24 with Dr. Óscar Clarke    Assessment:   Pt mod'ly challenged w/ stdg ex as she fatigues rather quickly. Cues w/ stdg hip hiking for form/pace as pt tends to heavily compensate to complete ex. Decreased chevy in general to stdg ex. Pt hesitant to do more stdg ex as she has errands to run when done w/ PT appt. Encouraged her to slowly increase activity levels at home to improve her overall functional ability. We will focus on stdg ex NV to build this tolerance.     Plan:    Cont with quad strengthening as pain allows. Start w/ Stdg ex's as chevy NV.     Current Problem  1. Pain of left femur  Follow Up In Physical Therapy      2. Displaced transverse fracture of shaft of left femur, subsequent encounter for closed fracture with routine healing  Follow Up In Physical Therapy          Subjective   General  Reason for Referral: L femur fx  Referred By: Dr. Óscar Clarke Pt states she isn't bad today, 2/10 on pain scale. Pt having most discomfort through her Lt glute. Pt states she has been doing stairs a little more and is trying to use her leg more to go up.   Precautions  Precautions  Precautions Comment: No new falls reported    Pain  Pain Assessment: 0-10  0-10 (Numeric) Pain Score: 2  Pain Location: Hip  Pain Orientation: Left  Pain Descriptors: Aching  Pain Frequency: Intermittent    Objective       Treatments:  Therapeutic Exercise (44836):   Bridges: 2x10  SLR: x5 LLE, x10 RLE  LAQ: 3\" 2x10  STS: 2x5 (broken up between ex's)   Seated TB HS Curl YTB x10 YANET   Standing marches/HSC: x20 " each (marches x10)   Standing hip hikes: x10    EDUCATION:       Goals:  Pt will be independent with advanced HEP PROGRESSING  Pt will improve BLE strength to at least 4+/5 in order to facilitate improved stability with all functional mobility PROGRESSING  Pt will demo L LE SLS >/=10 sec without UE assist on compliant surface for functional carryover into dynamic balance PROGRESSING  Pt will negotiate flight of stairs mod I reciprocally without increased LLE pain PROGRESSING  Pt will ambulate community distances independent over varying surfaces/inclines without increased LLE pain PROGRESSING  Pt will improve LEFS score by at least 9 points (MCID) to indicate significant improvement in functional abilities. PROGRESSING

## 2025-04-09 ENCOUNTER — TREATMENT (OUTPATIENT)
Dept: PHYSICAL THERAPY | Facility: CLINIC | Age: 65
End: 2025-04-09
Payer: MEDICARE

## 2025-04-09 DIAGNOSIS — M89.8X5 PAIN OF LEFT FEMUR: ICD-10-CM

## 2025-04-09 DIAGNOSIS — S72.322D DISPLACED TRANSVERSE FRACTURE OF SHAFT OF LEFT FEMUR, SUBSEQUENT ENCOUNTER FOR CLOSED FRACTURE WITH ROUTINE HEALING: ICD-10-CM

## 2025-04-09 PROCEDURE — 97110 THERAPEUTIC EXERCISES: CPT | Mod: GP,CQ

## 2025-04-09 ASSESSMENT — PAIN - FUNCTIONAL ASSESSMENT: PAIN_FUNCTIONAL_ASSESSMENT: 0-10

## 2025-04-09 ASSESSMENT — PAIN SCALES - GENERAL: PAINLEVEL_OUTOF10: 2

## 2025-04-09 ASSESSMENT — PAIN DESCRIPTION - DESCRIPTORS: DESCRIPTORS: ACHING

## 2025-04-09 NOTE — PROGRESS NOTES
"Physical Therapy Treatment    Patient Name: Halie Martinez  MRN: [id number]  Today's Date: 4/9/2025  Time Calculation  Start Time: 1050  Stop Time: 1130  Time Calculation (min): 40 min     PT Therapeutic Procedures Time Entry  Therapeutic Exercise Time Entry: 40                 Insurance:   Visit number: 11   [employer] MEDICARE BMN PT OT COPAY 40 DED 0,COVERAGE 100 OOP [id number]([id number])   NO AUTH REQ      65 y/o F s/p L IM femoral nailing on 12/19/24 with Dr. Óscar Clarke  Assessment:  Started w/ stdg ex's today as this is where she fatigues the quickest and has the most difficulty. She was most challenged w/ Stdg Hip Ext, flo on RLE. Resumed marching and stdg HS curls this visit, also w/ challenge dt weakness and poor endurance. She cont's to be mod'ly challenged and fatigues quickly w/ SLR. Pt, overall, able to chevy more ex's this visit. Encouraged her to increase activities at home as well as being more compliant w/ HEP to help improve her overall strength and functional ability.     Plan:    Cont with quad strengthening as pain allows.    Current Problem  1. Pain of left femur  Follow Up In Physical Therapy      2. Displaced transverse fracture of shaft of left femur, subsequent encounter for closed fracture with routine healing  Follow Up In Physical Therapy          Subjective   General  Reason for Referral: L femur fx  Referred By: Dr. Óscar Russell states she is having some soreness today but is more \"confused\" as to why she wasn't that sore immediately after the last PT session however had more achiness and weakness the \"next day.\" Pt was happy initially that she wasn't sore but had new symptoms of weakness and achiness the next day. (Explained to her this is normal.)   Pt did not do ex's yesterday because of weakness however did attempt to make a dump cake. \"I couldn't stand for long and had to sit to get the cake in/out of the oven.\"   Precautions  Precautions  Precautions Comment: No new falls reported    Pain  Pain " "Assessment: 0-10  0-10 (Numeric) Pain Score: 2  Pain Location: Hip  Pain Orientation: Left  Pain Descriptors: Aching  Pain Frequency: Intermittent    Objective       Treatments:  Therapeutic Exercise ([id number]):    Bridges: x10  SLR: x5 YANET  LAQ: 3\" 2x10 --  STS: 2x5   Seated TB HS Curl YTB 2x10 YANET   Standing marches/HSC: x20 each  Standing hip hikes: x10  Stdg Hip Abd, Ext x10 YANET    EDUCATION:         "

## 2025-04-14 ENCOUNTER — TREATMENT (OUTPATIENT)
Dept: PHYSICAL THERAPY | Facility: CLINIC | Age: 65
End: 2025-04-14
Payer: MEDICARE

## 2025-04-14 DIAGNOSIS — S72.322D DISPLACED TRANSVERSE FRACTURE OF SHAFT OF LEFT FEMUR, SUBSEQUENT ENCOUNTER FOR CLOSED FRACTURE WITH ROUTINE HEALING: ICD-10-CM

## 2025-04-14 DIAGNOSIS — M89.8X5 PAIN OF LEFT FEMUR: ICD-10-CM

## 2025-04-14 PROCEDURE — 97110 THERAPEUTIC EXERCISES: CPT | Mod: GP

## 2025-04-14 NOTE — PROGRESS NOTES
Physical Therapy Treatment    Patient Name: Halie Martinez  MRN: [id number]  Time Calculation  Start Time: 1125  Stop Time: 1203  Time Calculation (min): 38 min     PT Therapeutic Procedures Time Entry  Therapeutic Exercise Time Entry: 38                   Current Problem  1. Pain of left femur  Follow Up In Physical Therapy      2. Displaced transverse fracture of shaft of left femur, subsequent encounter for closed fracture with routine healing  Follow Up In Physical Therapy      Insurance:   Visit number: 12  [employer] MEDICARE BMN PT OT COPAY 40 DED 0,COVERAGE 100 OOP [id number](70)   NO AUTH REQ      63 y/o F s/p L IM femoral nailing on 12/19/24 with Dr. Óscar Clarke    Subjective   General  Patient reports that her legs feel tired today. She did a lot over the weekend and has been doing a lot of standing and doing things around her house.   Precautions  Precautions  Precautions Comment: No new falls reported  Pain  1/10, minor aching/soreness    Objective     Treatments:  Standing 3-way hip: x10 each B  Standing marches: x15 B  Side stepping in // bars: x3 L   STS: 2x5  Bridges: x10  SLR: x5 B    OP EDUCATION/HEP:      Assessment   Continued to focus mainly on standing exercises this date to continue to improve strength/stability and muscular endurance. Patient has no c/o pain with exercises this date, only muscle fatigue as expected. Patient would continue to benefit from PT services to continue to decrease pain, increase ROM/tissue mobility, improve strength, gait and balance to facilitate return to prior level of activities as able.     Plan   Continue to improve strength/muscular endurance to improve stability with all functional mobility

## 2025-04-17 NOTE — PROGRESS NOTES
"Physical Therapy Treatment    Patient Name: Halie Martinez  MRN: [id number]  Today's Date: 4/21/2025  Time Calculation  Start Time: 1133  Stop Time: 1214  Time Calculation (min): 41 min     PT Therapeutic Procedures Time Entry  Therapeutic Exercise Time Entry: 39                 Current Problem  1. Pain of left femur  Follow Up In Physical Therapy      2. Displaced transverse fracture of shaft of left femur, subsequent encounter for closed fracture with routine healing  Follow Up In Physical Therapy          Insurance:  Visit number: 13  [employer] MEDICARE BMN PT OT COPAY 40 DED 0,COVERAGE 100 OOP [id number](70)   NO AUTH REQ      65 y/o F s/p L IM femoral nailing on 12/19/24 with Dr. Óscar Clarke     Precautions   Increased risk for falls, B foot drop w/ AFO     Subjective   Subjective:   Pt reports that she has been having a lot of L shoulder pain, She has also had pain in her L hip and groin and buttocks. Today, she doesn't really have leg pain, but thinks it is because she is thinking of her shoulder.   Pain  0/10 L LE pain.     Objective   Treatments:  LAQ 0# 10x   Seated HSC RTB 10x  Seated iso LAQ 5\"x10  Bridges: x10  Hooklying hip abd RTB 2x10  SLR: x5 B  SAQ 3\"x10  STS: 2x5    Standing 3-way hip: x10 each B  Standing marches: x15 B  Side stepping in // bars: x3 L       OP EDUCATION:  Strengthening of the quad important to help decrease L leg pain.      Assessment:   Pt had hip flexor pain with LAQ. Tried iso LAQ, and pt still had some pain and a lot of weakness displayed. Pt had difficulty raising L LE off table for SLR, thus added SAQ for more tolerable quad strengthening. Pt did well with hip abd on table. Able to do sit to stands, with occassional pushing against table with back of legs.     Plan:   Gradually cont with L LE strengthening, without irritating the L leg.               "

## 2025-04-21 ENCOUNTER — TREATMENT (OUTPATIENT)
Dept: PHYSICAL THERAPY | Facility: CLINIC | Age: 65
End: 2025-04-21
Payer: MEDICARE

## 2025-04-21 DIAGNOSIS — M89.8X5 PAIN OF LEFT FEMUR: Primary | ICD-10-CM

## 2025-04-21 DIAGNOSIS — S72.322D DISPLACED TRANSVERSE FRACTURE OF SHAFT OF LEFT FEMUR, SUBSEQUENT ENCOUNTER FOR CLOSED FRACTURE WITH ROUTINE HEALING: ICD-10-CM

## 2025-04-21 PROCEDURE — 97110 THERAPEUTIC EXERCISES: CPT | Mod: GP,CQ

## 2025-04-22 ENCOUNTER — OFFICE VISIT (OUTPATIENT)
Dept: ORTHOPEDIC SURGERY | Facility: CLINIC | Age: 65
End: 2025-04-22
Payer: MEDICARE

## 2025-04-22 ENCOUNTER — HOSPITAL ENCOUNTER (OUTPATIENT)
Dept: RADIOLOGY | Facility: CLINIC | Age: 65
Discharge: HOME | End: 2025-04-22
Payer: MEDICARE

## 2025-04-22 VITALS — HEIGHT: 64 IN | BODY MASS INDEX: 27.31 KG/M2 | WEIGHT: 160 LBS

## 2025-04-22 DIAGNOSIS — M54.2 CERVICAL PAIN: ICD-10-CM

## 2025-04-22 DIAGNOSIS — S46.012A TRAUMATIC TEAR OF LEFT ROTATOR CUFF, UNSPECIFIED TEAR EXTENT, INITIAL ENCOUNTER: Primary | ICD-10-CM

## 2025-04-22 DIAGNOSIS — M25.512 LEFT SHOULDER PAIN, UNSPECIFIED CHRONICITY: ICD-10-CM

## 2025-04-22 PROCEDURE — 1036F TOBACCO NON-USER: CPT | Performed by: FAMILY MEDICINE

## 2025-04-22 PROCEDURE — 72040 X-RAY EXAM NECK SPINE 2-3 VW: CPT | Performed by: FAMILY MEDICINE

## 2025-04-22 PROCEDURE — 99214 OFFICE O/P EST MOD 30 MIN: CPT | Performed by: FAMILY MEDICINE

## 2025-04-22 PROCEDURE — 3008F BODY MASS INDEX DOCD: CPT | Performed by: FAMILY MEDICINE

## 2025-04-22 PROCEDURE — 73030 X-RAY EXAM OF SHOULDER: CPT | Mod: LEFT SIDE | Performed by: FAMILY MEDICINE

## 2025-04-22 PROCEDURE — 72040 X-RAY EXAM NECK SPINE 2-3 VW: CPT

## 2025-04-22 PROCEDURE — 1159F MED LIST DOCD IN RCRD: CPT | Performed by: FAMILY MEDICINE

## 2025-04-22 PROCEDURE — 99213 OFFICE O/P EST LOW 20 MIN: CPT | Performed by: FAMILY MEDICINE

## 2025-04-22 PROCEDURE — L3670 SO ACRO/CLAV CAN WEB PRE OTS: HCPCS | Performed by: FAMILY MEDICINE

## 2025-04-22 PROCEDURE — 73030 X-RAY EXAM OF SHOULDER: CPT | Mod: LT

## 2025-04-22 RX ORDER — OXYCODONE AND ACETAMINOPHEN 5; 325 MG/1; MG/1
1 TABLET ORAL EVERY 12 HOURS PRN
Qty: 14 TABLET | Refills: 0 | Status: SHIPPED | OUTPATIENT
Start: 2025-04-22 | End: 2025-04-29

## 2025-04-22 ASSESSMENT — PATIENT HEALTH QUESTIONNAIRE - PHQ9
SUM OF ALL RESPONSES TO PHQ9 QUESTIONS 1 AND 2: 0
1. LITTLE INTEREST OR PLEASURE IN DOING THINGS: NOT AT ALL
2. FEELING DOWN, DEPRESSED OR HOPELESS: NOT AT ALL

## 2025-04-23 ENCOUNTER — APPOINTMENT (OUTPATIENT)
Dept: PHYSICAL THERAPY | Facility: CLINIC | Age: 65
End: 2025-04-23
Payer: MEDICARE

## 2025-04-23 NOTE — PROGRESS NOTES
Acute Injury New Patient Visit  Assessment & Plan  Rotator cuff tear, left shoulder  Suspected rotator cuff tear in the left shoulder with significant tenderness over the subacromial bursa. Limited range of motion with lateral abduction to 45 degrees and forward flexion to 25 degrees. Positive Neer's, Lee, [place]'s, drop arm, and supraspinatus tests. No fracture suspected. Mild arthritis present but not the primary cause of pain. Differential diagnosis includes rotator cuff tear due to limited range of motion and weak strength. Shared decision-making led to opting for pain medication over steroids. Discussed the use of a sling for comfort and the plan to obtain an MRI to further evaluate the rotator cuff.  - Prescribe Percocet, 14 tablets.  - Provide a sling for the left shoulder for comfort.  - Order MRI of the left shoulder to evaluate for rotator cuff tear.    Cervical arthritis  Cervical arthritis with limited range of motion and crepitus. Neck demonstrates significant arthritis but no severe numbness, tingling, or weakness in the hands. No immediate intervention required as symptoms are not causing significant functional impairment.    Orders Placed This Encounter    Sling    XR cervical spine 2-3 views    XR shoulder left 2+ views    MR knee left wo IV contrast    oxyCODONE-acetaminophen (Percocet) 5-325 mg tablet      Physical Exam:  GENERAL:  Patient is awake, alert, and oriented to person place and time.  Patient appears well nourished and well kept.  Affect Calm, Not Acutely Distressed.  HEENT:  Normocephalic, Atraumatic, EOMI  CARDIOVASCULAR:  Hemodynamically stable.  RESPIRATORY:  Normal respirations with unlabored breathing.  NEURO: Gross sensation intact to the upper extremities bilaterally.  EXTREMITY: Left shoulder exam and neck exam as below  Physical Exam  MUSCULOSKELETAL: Left shoulder shows significant tenderness on palpation over the subacromial bursa. Very limited range of motion with  lateral abduction to 45 degrees and forward flexion limited to 25 degrees. Positive Neer's, Lee, [place]'s, drop arm, and supraspinatus tests. Negative Speed's and Yergason's tests.  strength, distal pulses, and sensation are intact. Forearm compartments are soft and compressible. Neck exhibits limited range of motion with crepitus and significant arthritis.    IMAGING:   Results        XR cervical spine 2-3 views          Interpreted By:  Budinsky, Cole,   STUDY:  XR CERVICAL SPINE 2-3 VIEWS; ;  4/22/2025 11:50 am      INDICATION:  Signs/Symptoms:pain.      ACCESSION NUMBER(S):  [id number]      ORDERING CLINICIAN:  COLE BUDINSKY      IMPRESSION:  Cervical spine films demonstrate degenerative disc disease with  severe changes most noted at the C6-C7 levels. Ventral osteophytes  seen throughout the inferior portion of the cervical spine. Apparent  fusion of the C5-C6 vertebral noted, would clinically correlate for  surgical history or congenital abnormality. No obvious presence for  acute bony abnormality.          Signed by: Cole Budinsky 4/22/2025 5:44 PM  Dictation workstation:   [id number]         XR shoulder left 2+ views          Interpreted By:  Budinsky, Cole,   STUDY:  XR SHOULDER LEFT 2+ VIEWS; ;  4/22/2025 11:50 am      INDICATION:  Signs/Symptoms:pain.      ACCESSION NUMBER(S):  [id number]      ORDERING CLINICIAN:  COLE BUDINSKY      IMPRESSION:  Left shoulder films demonstrate no obvious presence for acute  fracture with mild superior elevation of the humeral head with  respect of the glenoid. Inferior glenohumeral arthritic changes are  seen as well as AC joint arthrosis. No obvious presence for loose or  intra-articular body.          Signed by: Cole Budinsky 4/22/2025 5:41 PM  Dictation workstation:   [id number]         Procedures   Patient ID: Halie Martinez is a 64 y.o. female who presents for Pain of the Left Shoulder (Patient c/o pain after a restless night last year and the pain is  getting worse in her left shoulder. No known injury./Xrays today) and Pain of the Neck (On going pain for a year now. No known injury/Xrays today).  History of Present Illness  Halie Matrinez is a 64 year old female with arthritis who presents with left shoulder pain and limited range of motion.    The left shoulder pain began last year after a sleepless night, initially presenting as neck discomfort. Over the following days, the discomfort migrated, leading to a decreased ability to lift her arm. Initially, the pain was not significant, so she did not seek medical attention.    Recently, while recovering from a broken femur sustained in December due to a stress fracture, she attempted to lift a casserole dish and experienced severe pain in her left shoulder. Since then, she has been unable to raise her arm, describing the pain as 'unbearable', with sensations similar to a tightening blood pressure cuff. She also experiences pins and needles sensations while sitting, but no tingling or burning in her hands.    She has a significant history of arthritis, including in her neck and shoulder, and has undergone three back surgeries, including spinal fusions. Despite the arthritis, she does not experience weakness in her hands or severe numbness and tingling.    She has not had any recent falls or injuries to the shoulder aside from the femur fracture. Her current medication for pain management includes Percocet, which she has used in the past for back pain.    At the conclusion of the visit there were no further questions by the patient/family regarding their plan of care.  Patient was instructed to call or return with any issues, questions, or concerns regarding their injury and/or treatment plan described above.    This medical note was created with the assistance of artificial intelligence (AI) for documentation purposes. The content has been reviewed and confirmed by the healthcare provider for accuracy and  completeness. Patient consented to the use of audio recording and use of AI during their visit.   04/22/25 at 10:02 PM - Cole C Budinsky, MD  Office:  [phone]

## 2025-04-24 NOTE — PROGRESS NOTES
"Physical Therapy Treatment    Patient Name: Halie Martinez  MRN: [id number]  Today's Date: 4/28/2025  Time Calculation  Start Time: 1050  Stop Time: 1132  Time Calculation (min): 42 min     PT Therapeutic Procedures Time Entry  Therapeutic Exercise Time Entry: 38                 Current Problem  1. Pain of left femur  Follow Up In Physical Therapy      2. Displaced transverse fracture of shaft of left femur, subsequent encounter for closed fracture with routine healing  Follow Up In Physical Therapy          Insurance:  Visit number: 14  [employer] MED[employer]ARE BMN PT OT COPAY 40 DED 0,COVERAGE 100 OOP [id number])   NO AUTH REQ      65 y/o F s/p L IM femoral nailing on 12/19/24 with Dr. Óscar Clarke     Precautions   Increased risk for falls, B foot drop w/ AFO        Subjective   Subjective:   Pt says that everything always seems to flare up in the evening. Pt isn't sure if her leg is really bothering her that much, since her shoulder hurts so much. Pt is awaiting an MRI for her shoulder. Pt says she hasn't been able to do her HEP due to shoulder pain.  Pain   0/10    Objective   Treatments:  LAQ 0# 10x2  Seated HSC RTB 10x  Seated iso LAQ 5\"x10  Seated hip abd RTB 2x10  Seated hip add w/ ball 5\"x10  Seated marching 2x10    Not Today:  SLR: x5 B  SAQ 3\"x10  STS: 2x5   Bridges: x10  Standing 3-way hip: x10 each B  Standing marches: x15 B  Side stepping in // bars: x3 L      OP EDUCATION:   Access Code: [email or web address]  URL: [email or web address]  Date: 04/28/2025  Prepared by: Lia Chong    Exercises  - Seated Long Arc Quad  - 1 x daily - 7 x weekly - 2 sets - 10 reps  - Seated March  - 1 x daily - 7 x weekly - 2 sets - 10 reps  - Seated Hip Abduction  - 1 x daily - 7 x weekly - 2 sets - 10 reps  - Seated Isometric Hip Adduction with Ball  - 1 x daily - 7 x weekly - 2 sets - 10 reps      Assessment:   Pt unable to tolerate supine or standing ex due to shoulder pain, thus exercises kept in sitting position. " Because of difficulty with progressing ex due to shoulder pain, her next 2 appts, are going to be canceled. Hopefully the steroid will start to help decrease shoulder pain and she will get MRI. Pt did fine with LAQ and hip abd/add. Pt fatigued with HSC.     Plan:   Cancel next to therapy appts, consistent with HEP

## 2025-04-25 DIAGNOSIS — S46.012A TRAUMATIC TEAR OF LEFT ROTATOR CUFF, UNSPECIFIED TEAR EXTENT, INITIAL ENCOUNTER: ICD-10-CM

## 2025-04-25 RX ORDER — METHYLPREDNISOLONE 4 MG/1
TABLET ORAL
Qty: 21 TABLET | Refills: 0 | Status: SHIPPED | OUTPATIENT
Start: 2025-04-25

## 2025-04-28 ENCOUNTER — TREATMENT (OUTPATIENT)
Dept: PHYSICAL THERAPY | Facility: CLINIC | Age: 65
End: 2025-04-28
Payer: MEDICARE

## 2025-04-28 DIAGNOSIS — M89.8X5 PAIN OF LEFT FEMUR: Primary | ICD-10-CM

## 2025-04-28 DIAGNOSIS — S72.322D DISPLACED TRANSVERSE FRACTURE OF SHAFT OF LEFT FEMUR, SUBSEQUENT ENCOUNTER FOR CLOSED FRACTURE WITH ROUTINE HEALING: ICD-10-CM

## 2025-04-28 PROCEDURE — 97110 THERAPEUTIC EXERCISES: CPT | Mod: GP,CQ

## 2025-05-07 ENCOUNTER — APPOINTMENT (OUTPATIENT)
Dept: PHYSICAL THERAPY | Facility: CLINIC | Age: 65
End: 2025-05-07
Payer: MEDICARE

## 2025-05-14 ENCOUNTER — APPOINTMENT (OUTPATIENT)
Dept: PHYSICAL THERAPY | Facility: CLINIC | Age: 65
End: 2025-05-14
Payer: MEDICARE

## 2025-05-21 ENCOUNTER — TREATMENT (OUTPATIENT)
Dept: PHYSICAL THERAPY | Facility: CLINIC | Age: 65
End: 2025-05-21
Payer: MEDICARE

## 2025-05-21 DIAGNOSIS — M89.8X5 PAIN OF LEFT FEMUR: ICD-10-CM

## 2025-05-21 DIAGNOSIS — S72.322D DISPLACED TRANSVERSE FRACTURE OF SHAFT OF LEFT FEMUR, SUBSEQUENT ENCOUNTER FOR CLOSED FRACTURE WITH ROUTINE HEALING: ICD-10-CM

## 2025-05-21 PROCEDURE — 97530 THERAPEUTIC ACTIVITIES: CPT | Mod: GP

## 2025-05-21 NOTE — PROGRESS NOTES
Physical Therapy Recheck/Treatment    Patient Name: Halie Martinez  MRN: [id number]  Time Calculation  Start Time: 1030  Stop Time: 1100  Time Calculation (min): 30 min     PT Therapeutic Procedures Time Entry  Therapeutic Activity Time Entry: 30                   Current Problem  1. Pain of left femur  Follow Up In Physical Therapy      2. Displaced transverse fracture of shaft of left femur, subsequent encounter for closed fracture with routine healing  Follow Up In Physical Therapy      Insurance:  Visit number: 15  [employer] MEDICARE BMN PT OT COPAY 40 DED 0,COVERAGE 100 OOP [id number])   NO AUTH REQ      65 y/o F s/p L IM femoral nailing on 12/19/24 with Dr. Óscar Clarke    Subjective   General    Precautions   Increased risk for falls, B foot drop w/ AFO   Pain      Objective   Hip Musculoskeletal Exam  Gait    Assistive device: cane  Gait additional comments: Decreased gait speed, decreased step length, increased double support time, decreased foot clearance B, discontinuous steps with use of SPC    Range of Motion    Right      Active ROM: normal.      Left      Active ROM: normal.      Strength    Right      Extension: 4/5.       Flexion: 4+/5.       Internal rotation: 4+/5.       External rotation: 4+/5.       Adduction: 4+/5.       Abduction: 4+/5.     Left      Extension: 4/5.       Flexion: 4+/5.       Internal rotation: 4/5.       External rotation: 4/5.       Adduction: 4+/5.       Abduction: 4+/5.       Outcome Measures:  Other Measures  Lower Extremity Funtional Score (LEFS): 24    Treatments:  Includes measurements for recheck    OP EDUCATION/HEP:  Educated patient on healing timeline, and importance of continuing with increasing activity and continuing with HEP on her own at home.     Assessment   Patient is a 65 y/o F who has completed 15 PT visits s/p L IM femoral nailing on 12/19/24 with Dr. Óscar Clarke. Initial injury occurred on 12/18/24 after pt suffered a fall at home. Patient is making  consistent progress towards all functional goals since starting PT, and feels she is at least 75-80% improved. She demos decreased pain, increased strength/muscular, improved gait and balance. Patient is slowly returning to all of her daily tasks and activities, and reports that she has been able to complete tasks around her home without use of cane. Extensive education given on importance of continuing with HEP and continuing to increase activity to continue to improve strength and muscular endurance. At this point, recommend patient be placed on hold to continue with HEP on her own, will follow-up as needed. Patient verbalizes understanding and is in agreement with this plan.    Goals:  Pt will be independent with advanced HEP GOAL MET  Pt will improve BLE strength to at least 4+/5 in order to facilitate improved stability with all functional mobility PROGRESSING  Pt will demo L LE SLS >/=10 sec without UE assist on compliant surface for functional carryover into dynamic balance PROGRESSING  Pt will negotiate flight of stairs mod I reciprocally without increased LLE pain PROGRESSING  Pt will ambulate community distances independent over varying surfaces/inclines without increased LLE pain PROGRESSING  Pt will improve LEFS score by at least 9 points (MCID) to indicate significant improvement in functional abilities. PROGRESSING    Plan   Place patient on hold to continue with exercises on her own

## 2025-05-22 ENCOUNTER — HOSPITAL ENCOUNTER (OUTPATIENT)
Dept: RADIOLOGY | Facility: HOSPITAL | Age: 65
Discharge: HOME | End: 2025-05-22
Payer: MEDICARE

## 2025-05-22 DIAGNOSIS — S46.012A TRAUMATIC TEAR OF LEFT ROTATOR CUFF, UNSPECIFIED TEAR EXTENT, INITIAL ENCOUNTER: ICD-10-CM

## 2025-05-22 DIAGNOSIS — M25.512 LEFT SHOULDER PAIN, UNSPECIFIED CHRONICITY: ICD-10-CM

## 2025-05-22 PROCEDURE — 73221 MRI JOINT UPR EXTREM W/O DYE: CPT | Mod: LT

## 2025-05-28 ENCOUNTER — APPOINTMENT (OUTPATIENT)
Dept: PHYSICAL THERAPY | Facility: CLINIC | Age: 65
End: 2025-05-28
Payer: MEDICARE

## 2025-06-06 ENCOUNTER — HOSPITAL ENCOUNTER (OUTPATIENT)
Dept: RADIOLOGY | Facility: EXTERNAL LOCATION | Age: 65
Discharge: HOME | End: 2025-06-06

## 2025-06-06 ENCOUNTER — OFFICE VISIT (OUTPATIENT)
Dept: ORTHOPEDIC SURGERY | Facility: CLINIC | Age: 65
End: 2025-06-06
Payer: MEDICARE

## 2025-06-06 DIAGNOSIS — M25.512 LEFT SHOULDER PAIN, UNSPECIFIED CHRONICITY: ICD-10-CM

## 2025-06-06 PROCEDURE — 99212 OFFICE O/P EST SF 10 MIN: CPT | Performed by: FAMILY MEDICINE

## 2025-06-06 PROCEDURE — 2500000004 HC RX 250 GENERAL PHARMACY W/ HCPCS (ALT 636 FOR OP/ED): Performed by: FAMILY MEDICINE

## 2025-06-06 PROCEDURE — 20611 DRAIN/INJ JOINT/BURSA W/US: CPT | Mod: LT | Performed by: FAMILY MEDICINE

## 2025-06-06 RX ORDER — TRIAMCINOLONE ACETONIDE 40 MG/ML
40 INJECTION, SUSPENSION INTRA-ARTICULAR; INTRAMUSCULAR
Status: COMPLETED | OUTPATIENT
Start: 2025-06-06 | End: 2025-06-06

## 2025-06-06 RX ORDER — LIDOCAINE HYDROCHLORIDE 10 MG/ML
6 INJECTION, SOLUTION INFILTRATION; PERINEURAL
Status: COMPLETED | OUTPATIENT
Start: 2025-06-06 | End: 2025-06-06

## 2025-06-06 RX ADMIN — TRIAMCINOLONE ACETONIDE 40 MG: 400 INJECTION, SUSPENSION INTRA-ARTICULAR; INTRAMUSCULAR at 12:28

## 2025-06-06 RX ADMIN — LIDOCAINE HYDROCHLORIDE 6 ML: 10 INJECTION, SOLUTION INFILTRATION; PERINEURAL at 12:28

## 2025-06-06 NOTE — PROGRESS NOTES
Established Patient Follow-Up Visit    CC:   Chief Complaint   Patient presents with    Left Shoulder - Follow-up     MRI review today       HPI:  Halie is a 64 y.o. female returns here today for follow-up visit regarding: Left shoulder pain, MRI results.  Patient states she still having a fair amount of discomfort she is curious about what is going on in her shoulder wondering why all of a sudden she has limited range of motion and strength she has significant difficulty reaching back behind to clasp her bra she also has difficulty closing the car door as well as lifting pushing and pulling anything up above her head.  She presents here today for follow-up evaluation.  She denies any neck pain or soft tissue discomfort, denies any numbness tingling or burning.  No new injury or trauma since last visit.          REVIEW OF SYSTEMS:  GENERAL: Negative for malaise, significant weight loss, fever  MUSCULOSKELETAL: See HPI  NEURO: Negative for numbness / tingling       PHYSICAL EXAM:  -Neuro: Gross sensation intact to the upper extremities bilaterally.  -Extremity: Left shoulder demonstrates limited range of motion with moderate crepitus minimal tenderness palpation over the subacromial bursa mild posterior shoulder pain no biceps pain minimal AC joint pain.  Range of motion forward flexion 120 degrees lateral abduction to 85 internal [place] to the small of her low back external rotation to her earlobe.  Equivocal Neer's Lee and [place]'s test, negative speeds and Yergason's.  Negative crossarm test    IMAGING: MRI results consistent with osteoarthritic changes about the glenohumeral and AC joint with chronic tendinopathy about the supraspinatus and infraspinatus with mild subscapularis tendinopathy also seen with partial tearing.  No presence for retraction or loose body chronic degenerative labral tear noted.  Point of Care Ultrasound  These images are not reportable by radiology and will not be interpreted   by   Radiologists.      PROCEDURE: Left shoulder injection as below  L Inj/Asp: L glenohumeral on 6/6/2025 12:28 PM  Indications: pain  Details: 22 G needle, ultrasound-guided posterior approach  Medications: 6 mL lidocaine 10 mg/mL (1 %); 40 mg triamcinolone acetonide 40 mg/mL  Outcome: tolerated well, no immediate complications  Procedure, treatment alternatives, risks and benefits explained, specific risks discussed. Consent was given by the patient. Immediately prior to procedure a time out was called to verify the correct patient, procedure, equipment, support staff and site/side marked as required. Patient was prepped and draped in the usual sterile fashion.            ASSESSMENT:   Follow-up visit for:  Problem List Items Addressed This Visit    None  Visit Diagnoses         Left shoulder pain, unspecified chronicity        Relevant Orders    Point of Care Ultrasound (Completed)             PLAN: Patient was provided with a cortisone injection here today we will see her back in 6 weeks for repeat evaluation she was provided with home exercises in lieu of formal physical therapy per patient.  Recommended intermittent icing continue with her anti-inflammatories range of motion and strength recovery as able no need for repeat x-rays at follow-up we discussed possibility providing her additional injections over the bursa or the AC joint if needed at follow-up.  She also understands that if the injection today does not provide any meaningful or lasting relief we will offer her a follow-up with one of my surgical colleagues going forward.  Orders Placed This Encounter    L Inj/Asp    Point of Care Ultrasound           At the conclusion of the visit there were no further questions by the patient/family regarding their plan of care.  Patient was instructed to call or return with any issues, questions, or concerns regarding their injury and/or treatment plan described above.     06/06/25 at 12:28 PM - Cole C Budinsky,  MD    Office: [phone]    This note was prepared using voice recognition software.  The details of this note are correct and have been reviewed, and corrected to the best of my ability.  Some grammatical errors may persist related to the Dragon software.

## 2025-07-10 ENCOUNTER — OFFICE VISIT (OUTPATIENT)
Dept: ORTHOPEDIC SURGERY | Facility: CLINIC | Age: 65
End: 2025-07-10
Payer: MEDICARE

## 2025-07-10 ENCOUNTER — HOSPITAL ENCOUNTER (OUTPATIENT)
Dept: RADIOLOGY | Facility: CLINIC | Age: 65
Discharge: HOME | End: 2025-07-10
Payer: MEDICARE

## 2025-07-10 DIAGNOSIS — S72.322D DISPLACED TRANSVERSE FRACTURE OF SHAFT OF LEFT FEMUR, SUBSEQUENT ENCOUNTER FOR CLOSED FRACTURE WITH ROUTINE HEALING: ICD-10-CM

## 2025-07-10 PROCEDURE — 99213 OFFICE O/P EST LOW 20 MIN: CPT | Performed by: ORTHOPAEDIC SURGERY

## 2025-07-10 PROCEDURE — 73552 X-RAY EXAM OF FEMUR 2/>: CPT | Mod: LT

## 2025-07-10 PROCEDURE — 1159F MED LIST DOCD IN RCRD: CPT | Performed by: ORTHOPAEDIC SURGERY

## 2025-07-10 PROCEDURE — 99212 OFFICE O/P EST SF 10 MIN: CPT | Performed by: ORTHOPAEDIC SURGERY

## 2025-07-10 PROCEDURE — 1036F TOBACCO NON-USER: CPT | Performed by: ORTHOPAEDIC SURGERY

## 2025-07-10 NOTE — PROGRESS NOTES
History of present illness: History left midshaft femur fracture intramedullary nail 12/19/2024    Physical exam:    General: No acute distress or breathing difficulty or discomfort, pleasant and cooperative with the examination.    Extremities: Dressing was removed if in place .  The surgical incision was clean and dry without drainage or infection.  The soft tissues were otherwise intact.  There was no abnormal limb swelling or evidence to indicate blood clots or deep vein thrombosis.    There is no gross evidence of redness or cellulitis.    Motion was within normal limits for postop visit.  The patient could move the extremity on the operative limb in a normal fashion without significant change.  Neurovascular status was unchanged.    Patient able to bear weight and ambulate just some soreness over the hip bursa at the nail insertion site only    Diagnostic studies: Healed left hip midshaft femur    Impression: Left midshaft femur fracture healed with some mild residual hip bursa symptoms    Plan: Light x-ray stretching low impact program follow-up to see us approximately 6 months with AP lateral left femur films if she still sore over the left hip bursa possible small injection may be of benefit

## 2025-07-23 ENCOUNTER — OFFICE VISIT (OUTPATIENT)
Dept: ORTHOPEDIC SURGERY | Facility: CLINIC | Age: 65
End: 2025-07-23
Payer: MEDICARE

## 2025-07-23 DIAGNOSIS — M77.8 TENDINITIS OF LEFT SHOULDER: ICD-10-CM

## 2025-07-23 DIAGNOSIS — M75.102 TEAR OF LEFT ROTATOR CUFF, UNSPECIFIED TEAR EXTENT, UNSPECIFIED WHETHER TRAUMATIC: ICD-10-CM

## 2025-07-23 DIAGNOSIS — M19.012 OSTEOARTHRITIS OF LEFT SHOULDER, UNSPECIFIED OSTEOARTHRITIS TYPE: ICD-10-CM

## 2025-07-23 PROCEDURE — 1159F MED LIST DOCD IN RCRD: CPT | Performed by: FAMILY MEDICINE

## 2025-07-23 PROCEDURE — 99213 OFFICE O/P EST LOW 20 MIN: CPT | Performed by: FAMILY MEDICINE

## 2025-07-23 PROCEDURE — 99212 OFFICE O/P EST SF 10 MIN: CPT | Performed by: FAMILY MEDICINE

## 2025-07-23 NOTE — PROGRESS NOTES
Follow-Up Patient Visit: Upper Extremity Injury  Assessment & Plan  Left glenohumeral arthritis chronic rotator cuff pathology  Motion improved, strength lacking. Daily activities possible, indicating improvement.    Provide physical therapy script for rotator cuff rehabilitation.   - Encourage continuation of home exercises to improve strength and motion.   - Monitor shoulder condition. Consider injection if needed after three months from last injection, potentially in September.       Shoulder arthritis   Shoulder arthritis may contribute to decreased motion and strength. Improved with treatment, managing well without significant pain.   - Provide physical therapy script for arthritis management in shoulder.        Orders Placed This Encounter    Referral to Physical Therapy     1. Tear of left rotator cuff, unspecified tear extent, unspecified whether traumatic    2. Tendinitis of left shoulder    3. Osteoarthritis of left shoulder, unspecified osteoarthritis type      Procedures  At the conclusion of the visit there were no further questions by the patient/family regarding their plan of care.  Patient was instructed to call or return with any issues, questions, or concerns regarding their injury and/or treatment plan described above.    PHYSICAL EXAM:  Neuro: Gross sensation intact to the upper extremities bilaterally.  Upper Extremity: Left shoulder with improved range of motion forward flexion to 145 degrees lateral abduction to just at 90 limited but improved internal rotation to the smaller low back external rotation is still a little bit limited towards the base of the skull.  Equivocal Neer's Lee and [place]'s,  strength equal symmetric and intact.  No pain about the insertion of the biceps and no soft tissue discomfort over the AC joint subacromial bursa or posterior shoulder.  Physical Exam      IMAGING:   Results  No new images       HPI  Patient ID: Halie Martinez is a 64 y.o. female who  presents for Follow-up of the Left Shoulder (S/p CSI- 06/06/25).  History of Present Illness  62 year old female who presents with shoulder pain for follow-up after steroid treatment.       She experienced significant improvement in her shoulder pain after receiving a steroid pack, which was prescribed following intense pain. Initially, she opted for pain pills, but the pain intensified, prompting her to request the steroid pack, which provided substantial relief.          Her shoulder motion has improved, with the ability to move up and down, although she lacks strength. She can scratch behind her neck and lower back. She mentions difficulty with activities such as hooking her bra last summer and lifting dishes, which highlighted her shoulder limitations.           She is not currently undergoing physical therapy but wants to focus therapy on her leg to eventually stop using a cane. She uses the cane in public but not at home. She has been able to wash her hair, indicating improved shoulder motion, but still lacks strength. She notes that her shoulder issues may be related to genetics or overuse, as she is ambidextrous.           She experienced a period of inactivity due to a leg issue, which may have contributed to her shoulder stiffness and pain, possibly developing into a frozen shoulder. She recalls difficulty showering and washing her hair due to her leg and shoulder issues.           No current shoulder pain and reports improved motion, although she lacks full strength.                  This medical note was created with the assistance of artificial intelligence (AI) for documentation purposes. The content has been reviewed and confirmed by the healthcare provider for accuracy and completeness. Patient consented to the use of audio recording and use of AI during their visit.   07/23/25 at 1:05 PM - Cole C Budinsky, MD  Office:  [phone]

## 2025-07-24 ENCOUNTER — HOSPITAL ENCOUNTER (OUTPATIENT)
Dept: LAB | Age: 65
Discharge: HOME | End: 2025-07-24
Payer: MEDICARE

## 2025-07-24 LAB
ALBUMIN SERPL-MCNC: 4.2 G/DL (ref 3.5–4.6)
ALP SERPL-CCNC: 138 U/L (ref 40–130)
ALT SERPL-CCNC: 30 U/L (ref 0–33)
ANION GAP SERPL CALCULATED.3IONS-SCNC: 11 MEQ/L (ref 9–15)
AST SERPL-CCNC: 31 U/L (ref 0–35)
BASOPHILS # BLD: 0.1 K/UL (ref 0–0.2)
BASOPHILS NFR BLD: 0.8 %
BILIRUB SERPL-MCNC: 0.4 MG/DL (ref 0.2–0.7)
BUN SERPL-MCNC: 22 MG/DL (ref 8–23)
CALCIUM SERPL-MCNC: 10.9 MG/DL (ref 8.5–9.9)
CHLORIDE SERPL-SCNC: 101 MEQ/L (ref 95–107)
CHOLEST SERPL-MCNC: 165 MG/DL (ref 0–199)
CO2 SERPL-SCNC: 23 MEQ/L (ref 20–31)
CREAT SERPL-MCNC: 0.7 MG/DL (ref 0.5–0.9)
EOSINOPHIL # BLD: 0.2 K/UL (ref 0–0.7)
EOSINOPHIL NFR BLD: 2.3 %
ERYTHROCYTE [DISTWIDTH] IN BLOOD BY AUTOMATED COUNT: 16 % (ref 11.5–14.5)
GLOBULIN SER CALC-MCNC: 2.6 G/DL (ref 2.3–3.5)
GLUCOSE SERPL-MCNC: 92 MG/DL (ref 70–99)
HCT VFR BLD AUTO: 34.2 % (ref 37–47)
HDLC SERPL-MCNC: 76 MG/DL (ref 40–59)
HGB BLD-MCNC: 11.7 G/DL (ref 12–16)
LDLC SERPL CALC-MCNC: 77 MG/DL (ref 0–129)
LYMPHOCYTES # BLD: 1.6 K/UL (ref 1–4.8)
LYMPHOCYTES NFR BLD: 18 %
MCH RBC QN AUTO: 32.5 PG (ref 27–31.3)
MCHC RBC AUTO-ENTMCNC: 34.2 % (ref 33–37)
MCV RBC AUTO: 95 FL (ref 79.4–94.8)
MONOCYTES # BLD: 1 K/UL (ref 0.2–0.8)
MONOCYTES NFR BLD: 11.9 %
NEUTROPHILS # BLD: 5.7 K/UL (ref 1.4–6.5)
NEUTS SEG NFR BLD: 66.2 %
PLATELET # BLD AUTO: 341 K/UL (ref 130–400)
POTASSIUM SERPL-SCNC: 4.3 MEQ/L (ref 3.4–4.9)
PROT SERPL-MCNC: 6.8 G/DL (ref 6.3–8)
RBC # BLD AUTO: 3.6 M/UL (ref 4.2–5.4)
SODIUM SERPL-SCNC: 135 MEQ/L (ref 135–144)
TRIGL SERPL-MCNC: 60 MG/DL (ref 0–150)
TSH SERPL-MCNC: 1.04 UIU/ML (ref 0.44–3.86)
WBC # BLD AUTO: 8.7 K/UL (ref 4.8–10.8)

## 2025-07-24 PROCEDURE — 84443 ASSAY THYROID STIM HORMONE: CPT

## 2025-07-24 PROCEDURE — 80053 COMPREHEN METABOLIC PANEL: CPT

## 2025-07-24 PROCEDURE — 36415 COLL VENOUS BLD VENIPUNCTURE: CPT

## 2025-07-24 PROCEDURE — 83036 HEMOGLOBIN GLYCOSYLATED A1C: CPT

## 2025-07-24 PROCEDURE — 85025 COMPLETE CBC W/AUTO DIFF WBC: CPT

## 2025-07-24 PROCEDURE — 80061 LIPID PANEL: CPT

## 2025-07-25 LAB
ESTIMATED AVERAGE GLUCOSE: 91 MG/DL
HBA1C MFR BLD: 4.8 % (ref 4–6)

## 2025-08-05 ENCOUNTER — APPOINTMENT (OUTPATIENT)
Dept: PHYSICAL THERAPY | Facility: CLINIC | Age: 65
End: 2025-08-05
Payer: MEDICARE

## 2025-08-13 ENCOUNTER — EVALUATION (OUTPATIENT)
Dept: PHYSICAL THERAPY | Facility: CLINIC | Age: 65
End: 2025-08-13
Payer: MEDICARE

## 2025-08-13 DIAGNOSIS — M25.512 CHRONIC LEFT SHOULDER PAIN: Primary | ICD-10-CM

## 2025-08-13 DIAGNOSIS — G89.29 CHRONIC LEFT SHOULDER PAIN: Primary | ICD-10-CM

## 2025-08-13 PROCEDURE — 97110 THERAPEUTIC EXERCISES: CPT | Mod: GP | Performed by: PHYSICAL THERAPIST

## 2025-08-13 PROCEDURE — 97161 PT EVAL LOW COMPLEX 20 MIN: CPT | Mod: GP | Performed by: PHYSICAL THERAPIST

## 2025-08-20 ENCOUNTER — TREATMENT (OUTPATIENT)
Dept: PHYSICAL THERAPY | Facility: CLINIC | Age: 65
End: 2025-08-20
Payer: MEDICARE

## 2025-08-20 DIAGNOSIS — G89.29 CHRONIC LEFT SHOULDER PAIN: ICD-10-CM

## 2025-08-20 DIAGNOSIS — M25.512 CHRONIC LEFT SHOULDER PAIN: ICD-10-CM

## 2025-08-20 PROCEDURE — 97110 THERAPEUTIC EXERCISES: CPT | Mod: GP,CQ

## 2025-08-22 ENCOUNTER — TREATMENT (OUTPATIENT)
Dept: PHYSICAL THERAPY | Facility: CLINIC | Age: 65
End: 2025-08-22
Payer: MEDICARE

## 2025-08-22 DIAGNOSIS — M25.512 CHRONIC LEFT SHOULDER PAIN: ICD-10-CM

## 2025-08-22 DIAGNOSIS — G89.29 CHRONIC LEFT SHOULDER PAIN: ICD-10-CM

## 2025-08-22 PROCEDURE — 97110 THERAPEUTIC EXERCISES: CPT | Mod: GP,CQ

## 2025-08-26 ENCOUNTER — TREATMENT (OUTPATIENT)
Dept: PHYSICAL THERAPY | Facility: CLINIC | Age: 65
End: 2025-08-26
Payer: MEDICARE

## 2025-08-26 DIAGNOSIS — G89.29 CHRONIC LEFT SHOULDER PAIN: ICD-10-CM

## 2025-08-26 DIAGNOSIS — M25.512 CHRONIC LEFT SHOULDER PAIN: ICD-10-CM

## 2025-08-26 PROCEDURE — 97110 THERAPEUTIC EXERCISES: CPT | Mod: GP,CQ

## 2025-08-28 ENCOUNTER — EVALUATION (OUTPATIENT)
Dept: PHYSICAL THERAPY | Facility: CLINIC | Age: 65
End: 2025-08-28
Payer: MEDICARE

## 2025-08-28 DIAGNOSIS — R29.898 BILATERAL LEG WEAKNESS: Primary | ICD-10-CM

## 2025-08-28 DIAGNOSIS — M25.512 CHRONIC LEFT SHOULDER PAIN: ICD-10-CM

## 2025-08-28 DIAGNOSIS — G89.29 CHRONIC LEFT SHOULDER PAIN: ICD-10-CM

## 2025-08-28 PROCEDURE — 97161 PT EVAL LOW COMPLEX 20 MIN: CPT | Mod: GP | Performed by: PHYSICAL THERAPIST

## 2025-08-28 PROCEDURE — 97110 THERAPEUTIC EXERCISES: CPT | Mod: GP | Performed by: PHYSICAL THERAPIST

## 2025-09-03 ENCOUNTER — TREATMENT (OUTPATIENT)
Dept: PHYSICAL THERAPY | Facility: CLINIC | Age: 65
End: 2025-09-03
Payer: MEDICARE

## 2025-09-03 DIAGNOSIS — M25.512 CHRONIC LEFT SHOULDER PAIN: ICD-10-CM

## 2025-09-03 DIAGNOSIS — G89.29 CHRONIC LEFT SHOULDER PAIN: ICD-10-CM

## 2025-09-03 DIAGNOSIS — R29.898 BILATERAL LEG WEAKNESS: Primary | ICD-10-CM

## 2025-09-03 PROCEDURE — 97110 THERAPEUTIC EXERCISES: CPT | Mod: GP | Performed by: PHYSICAL THERAPIST

## 2025-09-05 ENCOUNTER — TREATMENT (OUTPATIENT)
Dept: PHYSICAL THERAPY | Facility: CLINIC | Age: 65
End: 2025-09-05
Payer: MEDICARE

## 2025-09-05 DIAGNOSIS — G89.29 CHRONIC LEFT SHOULDER PAIN: ICD-10-CM

## 2025-09-05 DIAGNOSIS — R29.898 BILATERAL LEG WEAKNESS: Primary | ICD-10-CM

## 2025-09-05 DIAGNOSIS — M25.512 CHRONIC LEFT SHOULDER PAIN: ICD-10-CM

## 2025-09-05 PROCEDURE — 97110 THERAPEUTIC EXERCISES: CPT | Mod: GP | Performed by: PHYSICAL THERAPIST

## 2025-09-05 ASSESSMENT — PAIN - FUNCTIONAL ASSESSMENT: PAIN_FUNCTIONAL_ASSESSMENT: 0-10

## 2025-09-05 ASSESSMENT — PAIN SCALES - GENERAL: PAINLEVEL_OUTOF10: 0 - NO PAIN

## 2025-09-10 ENCOUNTER — APPOINTMENT (OUTPATIENT)
Dept: PHYSICAL THERAPY | Facility: CLINIC | Age: 65
End: 2025-09-10
Payer: MEDICARE

## (undated) DEVICE — DRAPE, SHEET, XL

## (undated) DEVICE — ROD, REAMING WITH STRAIGHT BALL TIP, 3.0 X 950MM, STERILE

## (undated) DEVICE — STAPLER, SKIN, PLUS, WIDE, 35

## (undated) DEVICE — BOWL, BASIN, 32 OZ, STERILE

## (undated) DEVICE — DRESSING, MEPILEX BORDER, POST-OP AG, 4 X 6 IN

## (undated) DEVICE — DRAPE, SHEET, U, W/ADHESIVE STRIP, IMPERVIOUS, 60 X 70 IN, DISPOSABLE, LF, STERILE

## (undated) DEVICE — DRILL BIT, 4.2MM 3-FLUTED QC 145MM

## (undated) DEVICE — STRAP, VELCRO, BODY, 4 X 60IN, NS

## (undated) DEVICE — SUTURE, VICRYL 0, TAPER POINT, CT-1 VIOLET 27 INCH

## (undated) DEVICE — DRILL BIT, 4.2MM 3-FLUTED QC 330MM 100MM CALB

## (undated) DEVICE — Device

## (undated) DEVICE — BANDAGE, COFLEX, 6 X 5 YDS, FOAM TAN, STERILE, LF

## (undated) DEVICE — DRAPE, SHEET, U, STERI DRAPE, 47 X 51 IN, DISPOSABLE, STERILE

## (undated) DEVICE — BLADE, GEN COATED 2.75, LF

## (undated) DEVICE — GLOVE, SURGICAL, PROTEXIS PI , 7.5, PF, LF

## (undated) DEVICE — CAUTERY, PENCIL, PUSH BUTTON, SMOKE EVAC, 70MM

## (undated) DEVICE — GUIDEWIRE, 3.2 X 400

## (undated) DEVICE — MANIFOLD, 4 PORT NEPTUNE STANDARD

## (undated) DEVICE — BANDAGE, ELASTIC, 6 X 10YD, BEIGE, LF

## (undated) DEVICE — DRAPE, INSTRUMENT, W/POUCH, STERI DRAPE, 7 X 11 IN, DISPOSABLE, STERILE

## (undated) DEVICE — TOWEL PACK, STERILE, 16X24, XRAY DETECTABLE, BLUE, 4/PK

## (undated) DEVICE — DRAPE, TIBURON, SPLIT SHEET, REINF ADH STRIP, 77X122

## (undated) DEVICE — SOLUTION, IRRIGATION, STERILE WATER, 1000 ML, POUR BOTTLE

## (undated) DEVICE — STOCKINETTE, IMPERVIOUS, LARGE, 9IN X 48IN

## (undated) DEVICE — STRAP, ARM BOARD, 32 X 1.5

## (undated) DEVICE — GLOVE, SURGICAL, PROTEXIS PI BLUE W/NEUTHERA, 8.0, PF, LF

## (undated) DEVICE — PREP, IODOPHOR, W/ALCOHOL, DURAPREP, W/APPLICATOR, 26 CC